# Patient Record
Sex: FEMALE | Race: WHITE | NOT HISPANIC OR LATINO | Employment: OTHER | URBAN - METROPOLITAN AREA
[De-identification: names, ages, dates, MRNs, and addresses within clinical notes are randomized per-mention and may not be internally consistent; named-entity substitution may affect disease eponyms.]

---

## 2017-05-19 ENCOUNTER — OFFICE VISIT (OUTPATIENT)
Dept: INTERNAL MEDICINE | Facility: CLINIC | Age: 77
End: 2017-05-19
Payer: COMMERCIAL

## 2017-05-19 VITALS
DIASTOLIC BLOOD PRESSURE: 66 MMHG | HEIGHT: 61 IN | TEMPERATURE: 96.6 F | WEIGHT: 166 LBS | SYSTOLIC BLOOD PRESSURE: 118 MMHG | BODY MASS INDEX: 31.34 KG/M2 | HEART RATE: 84 BPM | OXYGEN SATURATION: 95 % | RESPIRATION RATE: 20 BRPM

## 2017-05-19 DIAGNOSIS — R35.0 URINARY FREQUENCY: ICD-10-CM

## 2017-05-19 DIAGNOSIS — I10 ESSENTIAL HYPERTENSION WITH GOAL BLOOD PRESSURE LESS THAN 140/90: Primary | ICD-10-CM

## 2017-05-19 DIAGNOSIS — K21.9 GASTROESOPHAGEAL REFLUX DISEASE WITHOUT ESOPHAGITIS: ICD-10-CM

## 2017-05-19 DIAGNOSIS — R10.9 STOMACH PAIN: ICD-10-CM

## 2017-05-19 DIAGNOSIS — R91.8 PULMONARY NODULES: ICD-10-CM

## 2017-05-19 DIAGNOSIS — Z85.01 PERSONAL HISTORY OF MALIGNANT NEOPLASM OF ESOPHAGUS: ICD-10-CM

## 2017-05-19 PROCEDURE — 99397 PER PM REEVAL EST PAT 65+ YR: CPT | Performed by: INTERNAL MEDICINE

## 2017-05-19 PROCEDURE — 99214 OFFICE O/P EST MOD 30 MIN: CPT | Mod: 25 | Performed by: INTERNAL MEDICINE

## 2017-05-19 RX ORDER — SOLIFENACIN SUCCINATE 5 MG/1
5 TABLET, FILM COATED ORAL DAILY
Qty: 30 TABLET | Refills: 1 | Status: SHIPPED | OUTPATIENT
Start: 2017-05-19 | End: 2019-05-02

## 2017-05-19 RX ORDER — OXYCODONE HYDROCHLORIDE 5 MG/1
5 TABLET ORAL EVERY 8 HOURS PRN
Qty: 100 TABLET | Refills: 0 | Status: SHIPPED | OUTPATIENT
Start: 2017-05-19 | End: 2017-06-15

## 2017-05-19 ASSESSMENT — PAIN SCALES - GENERAL: PAINLEVEL: NO PAIN (0)

## 2017-05-19 NOTE — NURSING NOTE
"Chief Complaint   Patient presents with     Wellness Visit       Initial /66  Pulse 84  Temp 96.6  F (35.9  C) (Temporal)  Resp 20  Ht 5' 1\" (1.549 m)  Wt 166 lb (75.3 kg)  SpO2 95%  BMI 31.37 kg/m2 Estimated body mass index is 31.37 kg/(m^2) as calculated from the following:    Height as of this encounter: 5' 1\" (1.549 m).    Weight as of this encounter: 166 lb (75.3 kg).  Medication Reconciliation: complete    "

## 2017-05-19 NOTE — MR AVS SNAPSHOT
After Visit Summary   5/19/2017    Ingrid Garcia    MRN: [id number]           Patient Information     Date Of Birth          1940        Visit Information        Provider Department      5/19/2017 10:30 AM Mic Golden MD [facility] Instructions      Preventive Health Recommendations    Female Ages 65 +    Yearly exam:     See your health care provider every year in order to  o Review health changes.   o Discuss preventive care.    o Review your medicines if your doctor has prescribed any.      You no longer need a yearly Pap test unless you've had an abnormal Pap test in the past 10 years. If you have vaginal symptoms, such as bleeding or discharge, be sure to talk with your provider about a Pap test.      Every 1 to 2 years, have a mammogram.  If you are over 69, talk with your health care provider about whether or not you want to continue having screening mammograms.      Every 10 years, have a colonoscopy. Or, have a yearly FIT test (stool test). These exams will check for colon cancer.       Have a cholesterol test every 5 years, or more often if your doctor advises it.       Have a diabetes test (fasting glucose) every three years. If you are at risk for diabetes, you should have this test more often.       At age 65, have a bone density scan (DEXA) to check for osteoporosis (brittle bone disease).    Shots:    Get a flu shot each year.    Get a tetanus shot every 10 years.    Talk to your doctor about your pneumonia vaccines. There are now two you should receive - Pneumovax (PPSV 23) and Prevnar (PCV 13).    Talk to your doctor about the shingles vaccine.    Talk to your doctor about the hepatitis B vaccine.    Nutrition:     Eat at least 5 servings of fruits and vegetables each day.      Eat whole-grain bread, whole-wheat pasta and brown rice instead of white grains and rice.      Talk to your provider about Calcium and Vitamin D.     Lifestyle    Exercise  "at least 150 minutes a week (30 minutes a day, 5 days a week). This will help you control your weight and prevent disease.      Limit alcohol to one drink per day.      No smoking.       Wear sunscreen to prevent skin cancer.       See your dentist twice a year for an exam and cleaning.      See your eye doctor every 1 to 2 years to screen for conditions such as glaucoma, macular degeneration and cataracts.        Follow-ups after your visit        Who to contact     If you have questions or need follow up information about today's clinic visit or your schedule please contact [facility] directly at [phone].  Normal or non-critical lab and imaging results will be communicated to you by [employer], letter or phone within 4 business days after the clinic has received the results. If you do not hear from us within 7 days, please contact the clinic through [employer] or phone. If you have a critical or abnormal lab result, we will notify you by phone as soon as possible.  Submit refill requests through [employer] or call your pharmacy and they will forward the refill request to us. Please allow 3 business days for your refill to be completed.          Additional Information About Your Visit        MyChart Information     [employer] lets you send messages to your doctor, view your test results, renew your prescriptions, schedule appointments and more. To sign up, go to [email or web address] . Click on \"Log in\" on the left side of the screen, which will take you to the Welcome page. Then click on \"Sign up Now\" on the right side of the page.     You will be asked to enter the access code listed below, as well as some personal information. Please follow the directions to create your username and password.     Your access code is: [email or web address]  Expires: 2017 10:51 AM     Your access code will  in 90 days. If you need help or a new code, please call your [facility] or [phone].        Care " "EveryWhere ID     This is your Care EveryWhere ID. This could be used by other organizations to access your [place] medical records  [id number]        Your Vitals Were     Pulse Temperature Respirations Height Pulse Oximetry BMI (Body Mass Index)    84 96.6  F (35.9  C) (Temporal) 20 5' 1\" (1.549 m) 95% 31.37 kg/m2       Blood Pressure from Last 3 Encounters:   05/19/17 118/66   09/02/16 127/62   08/24/16 114/58    Weight from Last 3 Encounters:   05/19/17 166 lb (75.3 kg)   09/02/16 166 lb (75.3 kg)   08/24/16 166 lb (75.3 kg)              Today, you had the following     No orders found for display       Primary Care Provider Office Phone # Fax #    Mic Golden [phone]       [place] [address] [place] [address]        Thank you!     Thank you for choosing [facility]  for your care. Our goal is always to provide you with excellent care. Hearing back from our patients is one way we can continue to improve our services. Please take a few minutes to complete the written survey that you may receive in the mail after your visit with us. Thank you!             Your Updated Medication List - Protect others around you: Learn how to safely use, store and throw away your medicines at www.disposemymeds.org.          This list is accurate as of: 5/19/17 10:51 AM.  Always use your most recent med list.                   Brand Name Dispense Instructions for use    albuterol 108 (90 BASE) MCG/ACT Inhaler    PROAIR HFA/PROVENTIL HFA/VENTOLIN HFA    1 Inhaler    Inhale 2 puffs into the lungs 2 times daily And as needed       amLODIPine-benazepril 5-20 MG per capsule    LOTREL    90 capsule    Take 1 capsule by mouth daily       CALTRATE 600+D 600-400 MG-UNIT Chew   Generic drug:  calcium carbonate-vitamin D     180 tablet    Take 1 chew tab by mouth 2 times daily       COENZYME Q-10 PO      Take 200 mg by mouth.       cyanocobalamin 1000 MCG tablet    vitamin  " B-12     Take by mouth daily       furosemide 20 MG tablet    LASIX    90 tablet    TAKE 1 TABLET (20MG) DAILY       IMODIUM A-D 2 MG tablet   Generic drug:  loperamide      Take 1-2 tablets by mouth once a week       metoprolol 25 MG tablet    LOPRESSOR    180 tablet    Take 1 tablet (25 mg) by mouth 2 times daily       Multi-vitamin Tabs tablet   Generic drug:  multivitamin, therapeutic with minerals      Take 1 tablet by mouth daily.       omeprazole 40 MG capsule    priLOSEC    180 capsule    TAKE 1 CAPSULE TWICE DAILY 30 TO 60 MINUTES BEFORE    MEALS       oxyCODONE 5 MG IR tablet    ROXICODONE    100 tablet    Take 1 tablet (5 mg) by mouth every 8 hours as needed for moderate to severe pain or pain 10 extra for the month for extra meals.       rosuvastatin 20 MG tablet    CRESTOR    90 tablet    Take 1 tablet (20 mg) by mouth daily

## 2017-05-20 ASSESSMENT — ASTHMA QUESTIONNAIRES: ACT_TOTALSCORE: 9

## 2017-05-23 ENCOUNTER — HOSPITAL ENCOUNTER (OUTPATIENT)
Dept: CT IMAGING | Facility: CLINIC | Age: 77
Discharge: HOME | End: 2017-05-23
Attending: INTERNAL MEDICINE | Admitting: INTERNAL MEDICINE
Payer: COMMERCIAL

## 2017-05-23 DIAGNOSIS — R91.8 PULMONARY NODULES: ICD-10-CM

## 2017-05-23 DIAGNOSIS — Z85.01 PERSONAL HISTORY OF MALIGNANT NEOPLASM OF ESOPHAGUS: ICD-10-CM

## 2017-05-23 DIAGNOSIS — R10.9 STOMACH PAIN: ICD-10-CM

## 2017-05-23 LAB
CREAT BLD-MCNC: 0.8 MG/DL (ref 0.52–1.04)
GFR SERPL CREATININE-BSD FRML MDRD: 70 ML/MIN/1.7M2

## 2017-05-23 PROCEDURE — 74177 CT ABD & PELVIS W/CONTRAST: CPT

## 2017-05-23 PROCEDURE — 71260 CT THORAX DX C+: CPT

## 2017-05-23 PROCEDURE — 25000128 H RX IP 250 OP 636: Performed by: INTERNAL MEDICINE

## 2017-05-23 PROCEDURE — 82565 ASSAY OF CREATININE: CPT | Performed by: INTERNAL MEDICINE

## 2017-05-23 PROCEDURE — 25000125 ZZHC RX 250: Performed by: INTERNAL MEDICINE

## 2017-05-23 RX ORDER — IOPAMIDOL 755 MG/ML
500 INJECTION, SOLUTION INTRAVASCULAR ONCE
Status: COMPLETED | OUTPATIENT
Start: 2017-05-23 | End: 2017-05-23

## 2017-05-23 RX ADMIN — IOPAMIDOL 80 ML: 755 INJECTION, SOLUTION INTRAVENOUS at 11:49

## 2017-05-23 RX ADMIN — SODIUM CHLORIDE 60 ML: 9 INJECTION, SOLUTION INTRAVENOUS at 11:48

## 2017-06-01 ENCOUNTER — TELEPHONE (OUTPATIENT)
Dept: INTERNAL MEDICINE | Facility: CLINIC | Age: 77
End: 2017-06-01

## 2017-06-01 NOTE — TELEPHONE ENCOUNTER
Reason for Call:  Request for results:    Name of test or procedure: CT    Date of test of procedure: 05/23/17    Location of the test or procedure: [facility]    OK to leave the result message on voice mail or with a family member? YES    Phone number Patient can be reached at:  Home number on file [phone] (home)    Additional comments:     Call taken on 6/1/2017 at 1:47 PM by Sahra Roberson

## 2017-06-15 DIAGNOSIS — R10.9 STOMACH PAIN: ICD-10-CM

## 2017-06-15 NOTE — TELEPHONE ENCOUNTER
Oxycodone 5mg       Last Written Prescription Date: 5/19/17  Last Fill Quantity: 100,  # refills: 0   Last Office Visit with [facility], [facility] or [facility] prescribing provider: 6/24/14    Hanna Ty [facility].  [facility]

## 2017-06-16 RX ORDER — OXYCODONE HYDROCHLORIDE 5 MG/1
5 TABLET ORAL EVERY 8 HOURS PRN
Qty: 100 TABLET | Refills: 0 | Status: SHIPPED | OUTPATIENT
Start: 2017-06-16 | End: 2017-07-12

## 2017-07-12 DIAGNOSIS — R10.9 STOMACH PAIN: ICD-10-CM

## 2017-07-12 RX ORDER — OXYCODONE HYDROCHLORIDE 5 MG/1
5 TABLET ORAL EVERY 8 HOURS PRN
Qty: 100 TABLET | Refills: 0 | Status: SHIPPED | OUTPATIENT
Start: 2017-07-12 | End: 2017-08-08

## 2017-07-12 NOTE — TELEPHONE ENCOUNTER
Oxycodone 5      Last Written Prescription Date: 06/16/2017  Last Fill Quantity: 100,  # refills: 0   Last Office Visit with [facility], [facility] or [facility] prescribing provider: 05/19/2017    Thank You,  Livan Ivey, Pharmacy [facility] [place]

## 2017-07-13 DIAGNOSIS — K21.9 GASTROESOPHAGEAL REFLUX DISEASE, ESOPHAGITIS PRESENCE NOT SPECIFIED: ICD-10-CM

## 2017-07-13 DIAGNOSIS — E78.5 HYPERLIPIDEMIA LDL GOAL <130: ICD-10-CM

## 2017-07-13 NOTE — TELEPHONE ENCOUNTER
Omeprazole (PRILOSEC) 40 MG capsule      Last Written Prescription Date: 06/17/2016  Last Fill Quantity: 180,  # refills: 3   Last Office Visit with [facility], [facility] or [facility] prescribing provider: 05/19/2017    Cindy Hoover CMA

## 2017-07-13 NOTE — LETTER
[address]   [address]  Tel. [phone]/ Fax [phone]    October 25, 2017        Ingrid Garcia  [address] [place] [id number]          Dear Ms. Ingrid ELIAZAR Garcia:    Your previous orders for lab work(Lipid Panel) have been extended.  Please call to schedule a lab appointment and return to the clinic to have the labs drawn at your earliest convenience.    Please be fasting for these tests,  remember to have nothing by mouth (except water) after midnight on the night before your test.    If you have any questions or concerns, please contact our office.    Sincerely,       Mci Golden MD

## 2017-07-17 RX ORDER — OMEPRAZOLE 40 MG/1
CAPSULE, DELAYED RELEASE ORAL
Qty: 180 CAPSULE | Refills: 3 | Status: SHIPPED | OUTPATIENT
Start: 2017-07-17 | End: 2018-07-01

## 2017-07-17 RX ORDER — ROSUVASTATIN CALCIUM 20 MG/1
TABLET, COATED ORAL
Qty: 90 TABLET | Refills: 0 | Status: SHIPPED | OUTPATIENT
Start: 2017-07-17 | End: 2018-06-27

## 2017-07-17 NOTE — TELEPHONE ENCOUNTER
Rosuvastatin: Medication is being filled for 1 time refill only due to:  Patient needs labs FLP for rosuvastatin.     Omeprazole: Prescription approved per [facility] Refill Protocol.

## 2017-07-17 NOTE — TELEPHONE ENCOUNTER
rosuvastatin     Last Written Prescription Date: 6/17/16  Last Fill Quantity: 90, # refills: 3  Last Office Visit with [facility], P or [facility] prescribing provider: 5/19/17       Lab Results   Component Value Date    CHOL 124 06/17/2016     Lab Results   Component Value Date    HDL 42 06/17/2016     Lab Results   Component Value Date    LDL 15 06/17/2016     Lab Results   Component Value Date    TRIG 333 06/17/2016     Lab Results   Component Value Date    CHOLHDLRATIO 1.9 05/11/2015

## 2017-08-08 DIAGNOSIS — R10.9 STOMACH PAIN: ICD-10-CM

## 2017-08-08 NOTE — TELEPHONE ENCOUNTER
oxycodone      Last Written Prescription Date: 07/12/17  Last Fill Quantity: 100,  # refills: 0   Last Office Visit with [facility], [facility] or [facility] prescribing provider: 05/19/2017    Thank You,  Livan Ivey, Pharmacy [facility] [place]

## 2017-08-10 RX ORDER — OXYCODONE HYDROCHLORIDE 5 MG/1
5 TABLET ORAL EVERY 8 HOURS PRN
Qty: 100 TABLET | Refills: 0 | Status: SHIPPED | OUTPATIENT
Start: 2017-08-10 | End: 2017-09-06

## 2017-09-06 DIAGNOSIS — R10.9 STOMACH PAIN: ICD-10-CM

## 2017-09-06 RX ORDER — OXYCODONE HYDROCHLORIDE 5 MG/1
5 TABLET ORAL EVERY 8 HOURS PRN
Qty: 100 TABLET | Refills: 0 | Status: SHIPPED | OUTPATIENT
Start: 2017-09-06 | End: 2017-10-03

## 2017-09-06 NOTE — TELEPHONE ENCOUNTER
Oxycodone 5      Last Written Prescription Date: 08/10/2017  Last Fill Quantity: 100,  # refills: 0   Last Office Visit with [facility], [facility] or [facility] prescribing provider: 05/19/2017    Thank You,  Livan Ivey, Pharmacy [facility] [place]

## 2017-09-20 DIAGNOSIS — R06.02 SOB (SHORTNESS OF BREATH): Primary | ICD-10-CM

## 2017-09-21 RX ORDER — ALBUTEROL SULFATE 90 UG/1
AEROSOL, METERED RESPIRATORY (INHALATION)
Qty: 18 G | Refills: 1 | Status: SHIPPED | OUTPATIENT
Start: 2017-09-21 | End: 2018-08-29

## 2017-09-21 NOTE — TELEPHONE ENCOUNTER
VENTOLIN  (90 BASE) MCG/ACT Inhaler   Last Written Prescription Date: 06/17/2016  Last Fill Quantity: 1 inahler, # refills: 11    Last Office Visit with [facility], [facility] or [facility] prescribing provider:  05/19/2017   Future Office Visit:       Date of Last Asthma Action Plan Letter:   Asthma Action Plan Q1 Year    Topic Date Due     Asthma Action Plan - yearly  08/24/2017      Asthma Control Test:   ACT Total Scores 5/19/2017   ACT TOTAL SCORE -   ASTHMA ER VISITS -   ASTHMA HOSPITALIZATIONS -   ACT TOTAL SCORE (Goal Greater than or Equal to 20) 9   In the past 12 months, how many times did you visit the emergency room for your asthma without being admitted to the hospital? 0   In the past 12 months, how many times were you hospitalized overnight because of your asthma? 0       Date of Last Spirometry Test:   No results found for this or any previous visit.

## 2017-09-21 NOTE — TELEPHONE ENCOUNTER
Routing refill request to provider for review/approval because:  A break in medication- last filled 6/17/2016  Lesia Crabtree RN

## 2017-10-02 ENCOUNTER — TELEPHONE (OUTPATIENT)
Dept: INTERNAL MEDICINE | Facility: CLINIC | Age: 77
End: 2017-10-02

## 2017-10-02 DIAGNOSIS — B37.0 THRUSH: Primary | ICD-10-CM

## 2017-10-02 RX ORDER — DIPHENHYDRAMINE HYDROCHLORIDE AND LIDOCAINE HYDROCHLORIDE AND ALUMINUM HYDROXIDE AND MAGNESIUM HYDRO
5-10 KIT EVERY 6 HOURS PRN
Qty: 237 ML | Refills: 1 | Status: SHIPPED | OUTPATIENT
Start: 2017-10-02 | End: 2018-06-27

## 2017-10-02 NOTE — TELEPHONE ENCOUNTER
Reason for Call:  Same Day Appointment, Requested Provider:  Mic Golden MD    PCP: Mic Golden    Reason for visit: Patient has thrush and would like to get some magic mouth wash. Can she be worked in for an appt this week with Dr. Golden.     Duration of symptoms: 1 month    Have you been treated for this in the past? Yes    Additional comments: [place].     Can we leave a detailed message on this number? YES    Phone number patient can be reached at: Home number on file [phone] (home)    Best Time: any    Call taken on 10/2/2017 at 1:11 PM by Huma Gonzales

## 2017-10-02 NOTE — TELEPHONE ENCOUNTER
Pt states it the same as before and Dr. Golden can send the same thing as before.  Rx has been placed, review and change if needed.

## 2017-10-03 DIAGNOSIS — R10.9 STOMACH PAIN: ICD-10-CM

## 2017-10-03 RX ORDER — OXYCODONE HYDROCHLORIDE 5 MG/1
5 TABLET ORAL EVERY 8 HOURS PRN
Qty: 100 TABLET | Refills: 0 | Status: SHIPPED | OUTPATIENT
Start: 2017-10-03 | End: 2018-05-30

## 2017-10-03 NOTE — TELEPHONE ENCOUNTER
oxycodone      Last Written Prescription Date: 09/06/2017  Last Fill Quantity: 100,  # refills: 0   Last Office Visit with [facility], [facility] or [facility] prescribing provider: 05/19/2017    Thank You,  Livan Ivey, Pharmacy [facility] [place]

## 2017-10-04 DIAGNOSIS — E78.5 HYPERLIPIDEMIA LDL GOAL <130: ICD-10-CM

## 2017-10-04 RX ORDER — ROSUVASTATIN CALCIUM 20 MG/1
TABLET, COATED ORAL
Qty: 90 TABLET | Refills: 1 | Status: SHIPPED | OUTPATIENT
Start: 2017-10-04 | End: 2018-05-30

## 2017-10-04 NOTE — TELEPHONE ENCOUNTER
Crestor     Last Written Prescription Date: 7-  Last Fill Quantity: 90, # refills: 0  Last Office Visit with [facility], P or [facility] prescribing provider: 5-       Lab Results   Component Value Date    CHOL 124 06/17/2016     Lab Results   Component Value Date    HDL 42 06/17/2016     Lab Results   Component Value Date    LDL 15 06/17/2016     Lab Results   Component Value Date    TRIG 333 06/17/2016     Lab Results   Component Value Date    CHOLHDLRATIO 1.9 05/11/2015

## 2018-04-28 DIAGNOSIS — I10 ESSENTIAL HYPERTENSION WITH GOAL BLOOD PRESSURE LESS THAN 140/90: ICD-10-CM

## 2018-04-30 RX ORDER — METOPROLOL TARTRATE 25 MG/1
TABLET, FILM COATED ORAL
Qty: 180 TABLET | Refills: 0 | Status: SHIPPED | OUTPATIENT
Start: 2018-04-30 | End: 2018-08-13

## 2018-04-30 RX ORDER — FUROSEMIDE 20 MG
TABLET ORAL
Qty: 90 TABLET | Refills: 0 | Status: SHIPPED | OUTPATIENT
Start: 2018-04-30 | End: 2018-08-13

## 2018-04-30 NOTE — TELEPHONE ENCOUNTER
Medication is being filled for 1 time refill only due to:  Patient needs to be seen because it has been more than one year since last visit. Has appt.   Jade Salinas RN

## 2018-04-30 NOTE — TELEPHONE ENCOUNTER
"Last Written Prescription Date:  8/29/2018  Last Fill Quantity: 90/180 ,  # refills: 2   Last office visit: 6/24/2014 with prescribing provider:     Future Office Visit:   Next 5 appointments (look out 90 days)     May 18, 2018  1:00 PM CDT   [facility] with [facility]   [facility]    [address] [place] [id number] [phone]                   Requested Prescriptions   Pending Prescriptions Disp Refills     furosemide (LASIX) 20 MG tablet [Pharmacy Med Name: FUROSEMIDE TAB 20MG] 90 tablet 2     Sig: TAKE 1 TABLET DAILY    Diuretics (Including Combos) Protocol Failed    4/28/2018  8:45 AM       Failed - Normal serum potassium on file in past 12 months    Recent Labs   Lab Test  06/17/16   0858   POTASSIUM  3.8                   Failed - Normal serum sodium on file in past 12 months    Recent Labs   Lab Test  06/17/16   0858   NA  146*             Passed - Blood pressure under 140/90 in past 12 months    BP Readings from Last 3 Encounters:   05/19/17 118/66   09/02/16 127/62   08/24/16 114/58                Passed - Recent (12 mo) or future (30 days) visit within the authorizing provider's specialty    Patient had office visit in the last 12 months or has a visit in the next 30 days with authorizing provider or within the authorizing provider's specialty.  See \"Patient Info\" tab in inbasket, or \"Choose Columns\" in Meds & Orders section of the refill encounter.           Passed - Patient is age 18 or older       Passed - No active pregancy on record       Passed - Normal serum creatinine on file in past 12 months    Recent Labs   Lab Test  06/17/16   0858   CR  0.81             Passed - No positive pregnancy test in past 12 months        metoprolol tartrate (LOPRESSOR) 25 MG tablet [Pharmacy Med Name: METOPROLOL TAB TAR 25MG] 180 tablet 2     Sig: TAKE 1 TABLET TWICE A DAY    Beta-Blockers Protocol Passed    4/28/2018  8:45 AM       Passed - Blood " "pressure under 140/90 in past 12 months    BP Readings from Last 3 Encounters:   05/19/17 118/66   09/02/16 127/62   08/24/16 114/58                Passed - Patient is age 6 or older       Passed - Recent (12 mo) or future (30 days) visit within the authorizing provider's specialty    Patient had office visit in the last 12 months or has a visit in the next 30 days with authorizing provider or within the authorizing provider's specialty.  See \"Patient Info\" tab in inbasket, or \"Choose Columns\" in Meds & Orders section of the refill encounter.              "

## 2018-05-22 ENCOUNTER — OFFICE VISIT (OUTPATIENT)
Dept: FAMILY MEDICINE | Facility: CLINIC | Age: 78
End: 2018-05-22
Payer: COMMERCIAL

## 2018-05-22 VITALS
RESPIRATION RATE: 14 BRPM | BODY MASS INDEX: 29.06 KG/M2 | WEIGHT: 148 LBS | OXYGEN SATURATION: 96 % | DIASTOLIC BLOOD PRESSURE: 58 MMHG | TEMPERATURE: 98.3 F | HEART RATE: 82 BPM | HEIGHT: 60 IN | SYSTOLIC BLOOD PRESSURE: 122 MMHG

## 2018-05-22 DIAGNOSIS — Z85.01 PERSONAL HISTORY OF MALIGNANT NEOPLASM OF ESOPHAGUS: Primary | ICD-10-CM

## 2018-05-22 DIAGNOSIS — R35.0 URINARY FREQUENCY: ICD-10-CM

## 2018-05-22 DIAGNOSIS — G89.4 CHRONIC PAIN SYNDROME: ICD-10-CM

## 2018-05-22 PROCEDURE — 99214 OFFICE O/P EST MOD 30 MIN: CPT | Performed by: FAMILY MEDICINE

## 2018-05-22 ASSESSMENT — PAIN SCALES - GENERAL: PAINLEVEL: NO PAIN (0)

## 2018-05-22 NOTE — PROGRESS NOTES
SUBJECTIVE:                                                      Chief Complaint   Patient presents with     Fatigue     Urinary Problem     frequency hurt alittle        URINARY TRACT SYMPTOMS  Onset: 2 months    Description:   Painful urination (Dysuria): YES- last couple days  Blood in urine (Hematuria): no   Delay in urine (Hesitency): no     Intensity: moderate    Progression of Symptoms:  same    Accompanying Signs & Symptoms:  Fever/chills: YES  Flank pain YES  Nausea and vomiting: YES- nausea  Any vaginal symptoms: none  Abdominal/Pelvic Pain: YES    History:   History of frequent UTI's: no   History of kidney stones: YES  Sexually Active: YES  Possibility of pregnancy: No    Precipitating factors:       Therapies Tried and outcome: had a urinary doctor  Concern - fatigue   Onset: 6 months    Description:   Just fell tired doesn't want to do anything    Intensity: moderate    Progression of Symptoms:  worsening    Accompanying Signs & Symptoms:  nothing    Previous history of similar problem:   no    Precipitating factors:   Worsened by: no    Alleviating factors:  Improved by: no    Therapies Tried and outcome:     Ingrid is a 78 year old comes in to [facility].  She has been seen intermittently by my partner here.  She also spends half her year in [place].  She complains mainly of worsening pain.  She has abdominal pain since receiving an esophagectomy him.  She describes it as a spasm a sharp pain that usually coincides with eating meals.  Pain happens 2 or 3 times daily and causes her to double over.  Quite severe.  Workup back in 2017 including a CT of the chest and abdomen, EGD in 2016, were unremarkable.  She is happy with her pain control on 4 Percocets daily, #120 for the month.  Her last doctor asked her to cut back to 100, which did not make her happy.    ROS:  Constitutional, HEENT, cardiovascular, pulmonary, gi and gu systems are negative, except as otherwise noted.    OBJECTIVE:                                                     /58 (BP Location: Right arm, Patient Position: Sitting, Cuff Size: Adult Regular)  Pulse 82  Temp 98.3  F (36.8  C) (Temporal)  Resp 14  Ht 5' (1.524 m)  Wt 148 lb (67.1 kg)  SpO2 96%  BMI 28.9 kg/m2  Body mass index is 28.9 kg/(m^2).    No exam today    Diagnostic Test Results:  none      ASSESSMENT/PLAN:                                                        ICD-10-CM    1. Personal history of malignant neoplasm of esophagus Z85.01    2. Urinary frequency R35.0 PHYSICAL THERAPY REFERRAL       Spent most of our time getting her background history in terms of her chronic pain.  I informed her if clinic policy not to fill pain medications on her first visit.  She will return next week after have had a chance to visit our prescription drug database and obtain records from her L about a pain clinic provider.  I discussed her case with her former primary, who did not have red flag symptoms were use.  In terms of her urinary frequency I will set her up with physical therapy.  She should cut back on her sugar fluid intake-green tea.  And stop Lasix.    Alex Berumen MD  [facility]

## 2018-05-22 NOTE — MR AVS SNAPSHOT
"              After Visit Summary   5/22/2018    Ingrid Garcia    MRN: [id number]           Patient Information     Date Of Birth          1940        Visit Information        Provider Department      5/22/2018 1:00 PM Alex Berumen MD [facility]        Today's Diagnoses     Personal history of malignant neoplasm of esophagus    -  1    Urinary frequency        Chronic pain syndrome           Follow-ups after your visit        Additional Services     PHYSICAL THERAPY REFERRAL       *This therapy referral will be filtered to a centralized scheduling office at [facility] and the patient will receive a call to schedule an appointment at a [place] location most convenient for them. *     [facility] provides Physical Therapy evaluation and treatment and many specialty services across the [place] system.  If requesting a specialty program, please choose from the list below.    If you have not heard from the scheduling office within 2 business days, please call [phone] for all locations, with the exception of [place], please call [phone] and [facility], please call [phone]  Treatment: Evaluation & Treatment  Special Instructions/Modalities:   Special Programs: Incontinence Pelvic Floor Program    Please be aware that coverage of these services is subject to the terms and limitations of your health insurance plan.  Call member services at your health plan with any benefit or coverage questions.      **Note to Provider:  If you are referring outside of [place] for the therapy appointment, please list the name of the location in the \"special instructions\" above, print the referral and give to the patient to schedule the appointment.                  Your next 10 appointments already scheduled     May 30, 2018  3:20 PM CDT   SHORT with Alex Berumen MD   [facility] ([address] " "[facility]] [address] [phone]              Who to contact     If you have questions or need follow up information about today's clinic visit or your schedule please contact [facility] directly at [phone].  Normal or non-critical lab and imaging results will be communicated to you by MyChart, letter or phone within 4 business days after the clinic has received the results. If you do not hear from us within 7 days, please contact the clinic through MyChart or phone. If you have a critical or abnormal lab result, we will notify you by phone as soon as possible.  Submit refill requests through [employer] or call your pharmacy and they will forward the refill request to us. Please allow 3 business days for your refill to be completed.          Additional Information About Your Visit        My[employer] Information     [employer] lets you send messages to your doctor, view your test results, renew your prescriptions, schedule appointments and more. To sign up, go to [email or web address] . Click on \"Log in\" on the left side of the screen, which will take you to the Welcome page. Then click on \"Sign up Now\" on the right side of the page.     You will be asked to enter the access code listed below, as well as some personal information. Please follow the directions to create your username and password.     Your access code is: [email or web address]  Expires: 2018  6:48 PM     Your access code will  in 90 days. If you need help or a new code, please call your [facility] or [phone].        Care EveryWhere ID     This is your Care EveryWhere ID. This could be used by other organizations to access your [facility] medical records  [id number]        Your Vitals Were     Pulse Temperature Respirations Height Pulse Oximetry BMI (Body Mass Index)    82 98.3  F (36.8  C) (Temporal) 14 5' (1.524 m) 96% 28.9 kg/m2       Blood Pressure from Last 3 Encounters:   18 122/58   17 118/66 "   09/02/16 127/62    Weight from Last 3 Encounters:   05/22/18 148 lb (67.1 kg)   05/19/17 166 lb (75.3 kg)   09/02/16 166 lb (75.3 kg)              We Performed the Following     PHYSICAL THERAPY REFERRAL        Primary Care Provider Office Phone # Fax #    Mic Golden [phone]       [address] [facility] [address]        Equal Access to Services     LUCAS JOHNSTON : Hadii aad ku hadasho Soomaali, waaxda luqadaha, qaybta kaalmada adeegyada, waxay idiin hayaan adeeg kharash lanawaf . So [facility] [phone].    ATENCIÓN: Si khadarla espshara, tiene a guillaume disposición servicios gratuitos de asistencia lingüística. [facility] [phone].    We comply with applicable federal civil rights laws and [place] laws. We do not discriminate on the basis of race, color, national origin, age, disability, sex, sexual orientation, or gender identity.            Thank you!     Thank you for choosing [facility]  for your care. Our goal is always to provide you with excellent care. Hearing back from our patients is one way we can continue to improve our services. Please take a few minutes to complete the written survey that you may receive in the mail after your visit with us. Thank you!             Your Updated Medication List - Protect others around you: Learn how to safely use, store and throw away your medicines at www.disposemymeds.org.          This list is accurate as of 5/22/18  6:48 PM.  Always use your most recent med list.                   Brand Name Dispense Instructions for use Diagnosis    * albuterol 108 (90 Base) MCG/ACT Inhaler    PROAIR HFA/PROVENTIL HFA/VENTOLIN HFA    1 Inhaler    Inhale 2 puffs into the lungs 2 times daily And as needed    Mild persistent asthma without complication       * VENTOLIN  (90 Base) MCG/ACT Inhaler   Generic drug:  albuterol     18 g    INHALE 2 PUFFS INTO THE LUNGS TWO TIMES A DAY AND AS NEEDED    SOB (shortness of breath)        amLODIPine-benazepril 5-20 MG per capsule    LOTREL    90 capsule    TAKE 1 CAPSULE DAILY    Essential hypertension with goal blood pressure less than 140/90       CALTRATE 600+D 600-400 MG-UNIT Chew   Generic drug:  calcium carbonate-vitamin D     180 tablet    Take 1 chew tab by mouth 2 times daily    Absence of menstruation       COENZYME Q-10 PO      Take 200 mg by mouth.        cyanocobalamin 1000 MCG tablet    vitamin  B-12     Take by mouth daily        furosemide 20 MG tablet    LASIX    90 tablet    TAKE 1 TABLET DAILY    Essential hypertension with goal blood pressure less than 140/90       IMODIUM A-D 2 MG tablet   Generic drug:  loperamide      Take 1-2 tablets by mouth once a week        magic mouthwash suspension (diphenhydrAMINE, lidocaine, aluminum-magnesium & simethicone) Susp compounding kit     237 mL    Swish and swallow 5-10 mLs in mouth every 6 hours as needed for mouth sores    Thrush       metoprolol tartrate 25 MG tablet    LOPRESSOR    180 tablet    TAKE 1 TABLET TWICE A DAY    Essential hypertension with goal blood pressure less than 140/90       Multi-vitamin Tabs tablet   Generic drug:  multivitamin, therapeutic with minerals      Take 1 tablet by mouth daily.        omeprazole 40 MG capsule    priLOSEC    180 capsule    TAKE 1 CAPSULE TWICE DAILY 30 TO 60 MINUTES BEFORE    MEALS    Gastroesophageal reflux disease, esophagitis presence not specified       oxyCODONE IR 5 MG tablet    ROXICODONE    100 tablet    Take 1 tablet (5 mg) by mouth every 8 hours as needed for moderate to severe pain or pain 10 extra for the month for extra meals.    Stomach pain       * rosuvastatin 20 MG tablet    CRESTOR    90 tablet    TAKE 1 TABLET DAILY    Hyperlipidemia LDL goal <130       * rosuvastatin 20 MG tablet    CRESTOR    90 tablet    TAKE 1 TABLET DAILY. DUE   FOR FASTING LIPID PANEL.   SET UP LAB APPOINTMENT    Hyperlipidemia LDL goal <130       solifenacin 5 MG tablet    VESICARE    30 tablet     Take 1 tablet (5 mg) by mouth daily    Urinary frequency       * Notice:  This list has 4 medication(s) that are the same as other medications prescribed for you. Read the directions carefully, and ask your doctor or other care provider to review them with you.

## 2018-05-22 NOTE — Clinical Note
Sorry to bother you with this 1, but we do not have a regular TC.  Wondering if you would be able to contact her pain clinic, Dr Russell, [phone], [place], [place], and get some records on her

## 2018-05-23 ENCOUNTER — TELEPHONE (OUTPATIENT)
Dept: FAMILY MEDICINE | Facility: CLINIC | Age: 78
End: 2018-05-23

## 2018-05-23 ENCOUNTER — TELEPHONE (OUTPATIENT)
Dept: INTERNAL MEDICINE | Facility: CLINIC | Age: 78
End: 2018-05-23

## 2018-05-23 DIAGNOSIS — R30.0 DYSURIA: ICD-10-CM

## 2018-05-23 DIAGNOSIS — R30.0 DYSURIA: Primary | ICD-10-CM

## 2018-05-23 LAB
ALBUMIN UR-MCNC: 30 MG/DL
APPEARANCE UR: CLEAR
BACTERIA #/AREA URNS HPF: ABNORMAL /HPF
BILIRUB UR QL STRIP: NEGATIVE
COLOR UR AUTO: YELLOW
GLUCOSE UR STRIP-MCNC: NEGATIVE MG/DL
GRAN CASTS #/AREA URNS LPF: 4 /LPF
HGB UR QL STRIP: NEGATIVE
HYALINE CASTS #/AREA URNS LPF: 12 /LPF (ref 0–2)
KETONES UR STRIP-MCNC: NEGATIVE MG/DL
LEUKOCYTE ESTERASE UR QL STRIP: ABNORMAL
MUCOUS THREADS #/AREA URNS LPF: PRESENT /LPF
NITRATE UR QL: NEGATIVE
PH UR STRIP: 5 PH (ref 5–7)
RBC #/AREA URNS AUTO: 4 /HPF (ref 0–2)
SOURCE: ABNORMAL
SP GR UR STRIP: 1.02 (ref 1–1.03)
SQUAMOUS #/AREA URNS AUTO: 6 /HPF (ref 0–1)
UROBILINOGEN UR STRIP-MCNC: 0 MG/DL (ref 0–2)
WBC #/AREA URNS AUTO: 32 /HPF (ref 0–5)

## 2018-05-23 PROCEDURE — 81001 URINALYSIS AUTO W/SCOPE: CPT | Performed by: INTERNAL MEDICINE

## 2018-05-23 PROCEDURE — 87086 URINE CULTURE/COLONY COUNT: CPT | Performed by: INTERNAL MEDICINE

## 2018-05-23 RX ORDER — CIPROFLOXACIN 500 MG/1
500 TABLET, FILM COATED ORAL 2 TIMES DAILY
Qty: 6 TABLET | Refills: 0 | COMMUNITY
Start: 2018-05-23 | End: 2024-08-08

## 2018-05-23 ASSESSMENT — ASTHMA QUESTIONNAIRES: ACT_TOTALSCORE: 21

## 2018-05-23 NOTE — TELEPHONE ENCOUNTER
Call out to pt to advised of Dr. Golden recommendations of having UA done. Pt states she doesn't want a call from Dr. Golden team and she doesn't want to see Dr. Golden anymore. Pt wants to call to be reviewed by Dr. Berumen.

## 2018-05-23 NOTE — TELEPHONE ENCOUNTER
Left message for patient to return call.   script is pending and will sign historical and call in when patient calls back to see what pharmacy to call.  Florence Balbuena MA 5/23/2018

## 2018-05-23 NOTE — TELEPHONE ENCOUNTER
Reason for Call:  Other prescription    Detailed comments: Patient was seen yesterday by Dr. Berumen. He asked her if she had a UTI and at that time she did not think so. She now has all of the symptoms of a UTI. Wondering if she can get a script for this without coming back in. She uses [facility]    Phone Number Patient can be reached at: Home number on file [phone] (home)    Best Time: any    Can we leave a detailed message on this number? YES    Call taken on 5/23/2018 at 9:23 AM by Huma Gonzales

## 2018-05-23 NOTE — TELEPHONE ENCOUNTER
Ingrid returns call and she would like medication called to [facility].  She will  later today.

## 2018-05-23 NOTE — TELEPHONE ENCOUNTER
----- Message from Sandy Parra CMA sent at 5/23/2018  4:11 PM CDT -----  Routed to [facility] to report result. Pt was upset when Dr. Golden team called her earlier.

## 2018-05-24 LAB
BACTERIA SPEC CULT: NO GROWTH
Lab: NORMAL
SPECIMEN SOURCE: NORMAL

## 2018-05-25 ENCOUNTER — HOSPITAL ENCOUNTER (EMERGENCY)
Facility: CLINIC | Age: 78
Discharge: HOME | End: 2018-05-25
Attending: FAMILY MEDICINE | Admitting: FAMILY MEDICINE
Payer: COMMERCIAL

## 2018-05-25 ENCOUNTER — APPOINTMENT (OUTPATIENT)
Dept: CT IMAGING | Facility: CLINIC | Age: 78
End: 2018-05-25
Attending: FAMILY MEDICINE
Payer: COMMERCIAL

## 2018-05-25 VITALS
SYSTOLIC BLOOD PRESSURE: 173 MMHG | OXYGEN SATURATION: 95 % | TEMPERATURE: 97.8 F | DIASTOLIC BLOOD PRESSURE: 80 MMHG | BODY MASS INDEX: 29.49 KG/M2 | WEIGHT: 151 LBS | RESPIRATION RATE: 17 BRPM

## 2018-05-25 DIAGNOSIS — D64.9 ANEMIA, UNSPECIFIED TYPE: ICD-10-CM

## 2018-05-25 DIAGNOSIS — E87.6 HYPOKALEMIA: ICD-10-CM

## 2018-05-25 DIAGNOSIS — R10.32 ABDOMINAL PAIN, LEFT LOWER QUADRANT: ICD-10-CM

## 2018-05-25 LAB
ALBUMIN SERPL-MCNC: 3.6 G/DL (ref 3.4–5)
ALBUMIN UR-MCNC: NEGATIVE MG/DL
ALP SERPL-CCNC: 60 U/L (ref 40–150)
ALT SERPL W P-5'-P-CCNC: 20 U/L (ref 0–50)
ANION GAP SERPL CALCULATED.3IONS-SCNC: 7 MMOL/L (ref 3–14)
APPEARANCE UR: CLEAR
AST SERPL W P-5'-P-CCNC: 15 U/L (ref 0–45)
BASOPHILS # BLD AUTO: 0 10E9/L (ref 0–0.2)
BASOPHILS NFR BLD AUTO: 0.4 %
BILIRUB SERPL-MCNC: 0.2 MG/DL (ref 0.2–1.3)
BILIRUB UR QL STRIP: NEGATIVE
BUN SERPL-MCNC: 10 MG/DL (ref 7–30)
CALCIUM SERPL-MCNC: 8.7 MG/DL (ref 8.5–10.1)
CHLORIDE SERPL-SCNC: 110 MMOL/L (ref 94–109)
CO2 SERPL-SCNC: 26 MMOL/L (ref 20–32)
COLOR UR AUTO: ABNORMAL
CREAT SERPL-MCNC: 0.79 MG/DL (ref 0.52–1.04)
CRP SERPL-MCNC: <2.9 MG/L (ref 0–8)
DIFFERENTIAL METHOD BLD: ABNORMAL
EOSINOPHIL # BLD AUTO: 0.4 10E9/L (ref 0–0.7)
EOSINOPHIL NFR BLD AUTO: 3.9 %
ERYTHROCYTE [DISTWIDTH] IN BLOOD BY AUTOMATED COUNT: 18.3 % (ref 10–15)
FERRITIN SERPL-MCNC: 2 NG/ML (ref 8–252)
FOLATE SERPL-MCNC: 34.3 NG/ML
GFR SERPL CREATININE-BSD FRML MDRD: 71 ML/MIN/1.7M2
GLUCOSE SERPL-MCNC: 159 MG/DL (ref 70–99)
GLUCOSE UR STRIP-MCNC: NEGATIVE MG/DL
HCT VFR BLD AUTO: 29.5 % (ref 35–47)
HGB BLD-MCNC: 8.4 G/DL (ref 11.7–15.7)
HGB UR QL STRIP: NEGATIVE
IMM GRANULOCYTES # BLD: 0 10E9/L (ref 0–0.4)
IMM GRANULOCYTES NFR BLD: 0.1 %
IRON SATN MFR SERPL: 3 % (ref 15–46)
IRON SERPL-MCNC: 13 UG/DL (ref 35–180)
KETONES UR STRIP-MCNC: NEGATIVE MG/DL
LEUKOCYTE ESTERASE UR QL STRIP: ABNORMAL
LIPASE SERPL-CCNC: 114 U/L (ref 73–393)
LYMPHOCYTES # BLD AUTO: 2.3 10E9/L (ref 0.8–5.3)
LYMPHOCYTES NFR BLD AUTO: 24.4 %
MCH RBC QN AUTO: 18.3 PG (ref 26.5–33)
MCHC RBC AUTO-ENTMCNC: 28.5 G/DL (ref 31.5–36.5)
MCV RBC AUTO: 64 FL (ref 78–100)
MONOCYTES # BLD AUTO: 1 10E9/L (ref 0–1.3)
MONOCYTES NFR BLD AUTO: 10.8 %
NEUTROPHILS # BLD AUTO: 5.7 10E9/L (ref 1.6–8.3)
NEUTROPHILS NFR BLD AUTO: 60.4 %
NITRATE UR QL: NEGATIVE
PH UR STRIP: 6 PH (ref 5–7)
PLATELET # BLD AUTO: 374 10E9/L (ref 150–450)
POTASSIUM SERPL-SCNC: 3.1 MMOL/L (ref 3.4–5.3)
PROT SERPL-MCNC: 6.9 G/DL (ref 6.8–8.8)
RBC # BLD AUTO: 4.6 10E12/L (ref 3.8–5.2)
RBC #/AREA URNS AUTO: <1 /HPF (ref 0–2)
RETICS # AUTO: 39 10E9/L (ref 25–95)
RETICS/RBC NFR AUTO: 0.9 % (ref 0.5–2)
SODIUM SERPL-SCNC: 143 MMOL/L (ref 133–144)
SOURCE: ABNORMAL
SP GR UR STRIP: 1 (ref 1–1.03)
SQUAMOUS #/AREA URNS AUTO: <1 /HPF (ref 0–1)
TIBC SERPL-MCNC: 374 UG/DL (ref 240–430)
UROBILINOGEN UR STRIP-MCNC: 0 MG/DL (ref 0–2)
VIT B12 SERPL-MCNC: 3080 PG/ML (ref 193–986)
WBC # BLD AUTO: 9.4 10E9/L (ref 4–11)
WBC #/AREA URNS AUTO: 4 /HPF (ref 0–5)

## 2018-05-25 PROCEDURE — 81001 URINALYSIS AUTO W/SCOPE: CPT | Performed by: FAMILY MEDICINE

## 2018-05-25 PROCEDURE — 82728 ASSAY OF FERRITIN: CPT | Performed by: FAMILY MEDICINE

## 2018-05-25 PROCEDURE — 83540 ASSAY OF IRON: CPT | Performed by: FAMILY MEDICINE

## 2018-05-25 PROCEDURE — 83550 IRON BINDING TEST: CPT | Performed by: FAMILY MEDICINE

## 2018-05-25 PROCEDURE — 74176 CT ABD & PELVIS W/O CONTRAST: CPT

## 2018-05-25 PROCEDURE — 86140 C-REACTIVE PROTEIN: CPT | Performed by: FAMILY MEDICINE

## 2018-05-25 PROCEDURE — 96374 THER/PROPH/DIAG INJ IV PUSH: CPT | Performed by: FAMILY MEDICINE

## 2018-05-25 PROCEDURE — 80053 COMPREHEN METABOLIC PANEL: CPT | Performed by: FAMILY MEDICINE

## 2018-05-25 PROCEDURE — 82746 ASSAY OF FOLIC ACID SERUM: CPT | Performed by: FAMILY MEDICINE

## 2018-05-25 PROCEDURE — 85025 COMPLETE CBC W/AUTO DIFF WBC: CPT | Performed by: FAMILY MEDICINE

## 2018-05-25 PROCEDURE — 82607 VITAMIN B-12: CPT | Performed by: FAMILY MEDICINE

## 2018-05-25 PROCEDURE — 83690 ASSAY OF LIPASE: CPT | Performed by: FAMILY MEDICINE

## 2018-05-25 PROCEDURE — 99284 EMERGENCY DEPT VISIT MOD MDM: CPT | Mod: Z6 | Performed by: FAMILY MEDICINE

## 2018-05-25 PROCEDURE — 85045 AUTOMATED RETICULOCYTE COUNT: CPT | Performed by: FAMILY MEDICINE

## 2018-05-25 PROCEDURE — 96375 TX/PRO/DX INJ NEW DRUG ADDON: CPT | Performed by: FAMILY MEDICINE

## 2018-05-25 PROCEDURE — 25000128 H RX IP 250 OP 636: Performed by: FAMILY MEDICINE

## 2018-05-25 PROCEDURE — 99285 EMERGENCY DEPT VISIT HI MDM: CPT | Mod: 25 | Performed by: FAMILY MEDICINE

## 2018-05-25 PROCEDURE — 96376 TX/PRO/DX INJ SAME DRUG ADON: CPT | Performed by: FAMILY MEDICINE

## 2018-05-25 RX ORDER — KETOROLAC TROMETHAMINE 15 MG/ML
15 INJECTION, SOLUTION INTRAMUSCULAR; INTRAVENOUS ONCE
Status: DISCONTINUED | OUTPATIENT
Start: 2018-05-25 | End: 2018-05-25

## 2018-05-25 RX ORDER — ONDANSETRON 2 MG/ML
4 INJECTION INTRAMUSCULAR; INTRAVENOUS EVERY 30 MIN PRN
Status: DISCONTINUED | OUTPATIENT
Start: 2018-05-25 | End: 2018-05-25

## 2018-05-25 RX ORDER — ONDANSETRON 2 MG/ML
4 INJECTION INTRAMUSCULAR; INTRAVENOUS EVERY 30 MIN PRN
Status: DISCONTINUED | OUTPATIENT
Start: 2018-05-25 | End: 2018-05-25 | Stop reason: HOSPADM

## 2018-05-25 RX ADMIN — ONDANSETRON 4 MG: 2 INJECTION INTRAMUSCULAR; INTRAVENOUS at 04:57

## 2018-05-25 ASSESSMENT — ENCOUNTER SYMPTOMS
DIARRHEA: 1
DIFFICULTY URINATING: 1
FREQUENCY: 0
ACTIVITY CHANGE: 1
HEMATURIA: 0
ABDOMINAL PAIN: 1
FATIGUE: 1
PSYCHIATRIC NEGATIVE: 1
BLOOD IN STOOL: 0
NEUROLOGICAL NEGATIVE: 1
FLANK PAIN: 0
FEVER: 0
DYSURIA: 1
CHILLS: 1
APPETITE CHANGE: 1
MUSCULOSKELETAL NEGATIVE: 1
PALPITATIONS: 0
CONSTIPATION: 0
RESPIRATORY NEGATIVE: 1
NAUSEA: 0
EYES NEGATIVE: 1
VOMITING: 0

## 2018-05-25 NOTE — ED TRIAGE NOTES
Pt presents with lower left abdominal pain.  Pt states that she is being treated for a bladder infection.  Pt states about an 1-2 hours ago she started to have this pain, reminds her of when she had a kidney stone.

## 2018-05-25 NOTE — ED NOTES
Provider in to speak with pt at this time.  Pt will be sent home with the occult stool card, and instructed on collection.  Pt was instructed to bring the card in to the clinic.

## 2018-05-25 NOTE — ED NOTES
Pt back from CT at this time.  Pt requesting something for pain and nausea at this time.   Updated provider.

## 2018-05-25 NOTE — ED AVS SNAPSHOT
[facility]    [address] [place] [address]    Phone:  [phone]    Fax:  [phone]                                       Ingrid Garcia   MRN: [id number]    Department:  [facility]   Date of Visit:  5/25/2018           After Visit Summary Signature Page     I have received my discharge instructions, and my questions have been answered. I have discussed any challenges I see with this plan with the nurse or doctor.    ..........................................................................................................................................  Patient/Patient Representative Signature      ..........................................................................................................................................  Patient Representative Print Name and Relationship to Patient    ..................................................               ................................................  Date                                            Time    ..........................................................................................................................................  Reviewed by Signature/Title    ...................................................              ..............................................  Date                                                            Time

## 2018-05-25 NOTE — ED NOTES
IV removed.  Reviewed discharge instructions with pt.  No additional questions or concerns. Pt states that nausea is gone.

## 2018-05-25 NOTE — ED PROVIDER NOTES
History     Chief Complaint   Patient presents with     Abdominal Pain     HPI  Ingrid Garcia is a 78 year old female who presents to the ER with her  with concerns about LLQ abd pain that started about 2-3 hours ago. She states the pain feels similar to previous renal stone pains.  Patient states that she is currently being treated for urinary tract infection and states that they called in a prescription for her from her clinic, Dr. Golden.  She does not remember the name of the med patient.  She denied any fall or injury as a reason for her left lower quadrant abdominal pain.  She does admit to some chills but denies fever she denies changes to her bowel or bladder.  She states that she always has little diarrhea.  Patient states that she has had her appendix removed as well as had esophageal cancer resection and repair.  She is also had removal of her uterus in the past.        I reviewed the following nurse triage note:  Lesia Ireland, RN 5/25/2018  4:05 AM        Pt presents with lower left abdominal pain.  Pt states that she is being treated for a bladder infection.  Pt states about an 1-2 hours ago she started to have this pain, reminds her of when she had a kidney stone.                          Problem List:    Patient Active Problem List    Diagnosis Date Noted     S/P appendectomy 10/13/2012     Priority: High     Chronic pain syndrome 05/22/2018     Priority: Medium     Essential hypertension with goal blood pressure less than 140/90 06/17/2016     Priority: Medium     Osteoporosis 06/16/2015     Priority: Medium     Moderate persistent asthma 06/24/2014     Priority: Medium     Advanced directives, counseling/discussion 06/18/2014     Priority: Medium     Packet information was given to patient at the appointment.  Hailey Oshea CMA         Heartburn 10/14/2012     Priority: Medium     GERD (gastroesophageal reflux disease) 10/13/2012     Priority: Medium     History of esophagectomy  10/14/2012     Priority: Low     Hyperlipidemia LDL goal <130 10/13/2012     Priority: Low     Personal history of malignant neoplasm of esophagus 10/13/2012     Priority: Low        Past Medical History:    Past Medical History:   Diagnosis Date     Esophageal adenocarcinoma (H) 2011     HTN (hypertension)      Hyperlipidemia      Kidney stones 3/2015     Overactive bladder      Thyroid nodule        Past Surgical History:    Past Surgical History:   Procedure Laterality Date     APPENDECTOMY OPEN  10/12/2012    Procedure: APPENDECTOMY OPEN;;  Surgeon: Sarath Valentin MD;  Location: [facility]     ATTEMPTED LAPAROSCOPY  10/12/2012    Procedure: ATTEMPTED LAPAROSCOPY;  Attempted Laparoscopic appendectomy, open appendectomy.;  Surgeon: Sarath Valentin MD;  Location: [facility] OR     COLONOSCOPY  08/27/08     ESOPHAGOGASTRECTOMY  3/2011    [facility]     ESOPHAGOSCOPY, GASTROSCOPY, DUODENOSCOPY (EGD), COMBINED  6/30/2014    Procedure: COMBINED ESOPHAGOSCOPY, GASTROSCOPY, DUODENOSCOPY (EGD);  Surgeon: Estevan Cota MD;  Location: [facility]     ESOPHAGOSCOPY, GASTROSCOPY, DUODENOSCOPY (EGD), COMBINED N/A 9/2/2016    Procedure: COMBINED ESOPHAGOSCOPY, GASTROSCOPY, DUODENOSCOPY (EGD);  Surgeon: Juan Antonio Esteban MD;  Location: [facility]     ESOPHAGOSCOPY, GASTROSCOPY, DUODENOSCOPY (EGD), DILATATION, COMBINED  6/1/2011    Procedure:COMBINED ESOPHAGOSCOPY, GASTROSCOPY, DUODENOSCOPY (EGD), DILATATION; Surgeon:KITTY BUCK; Location:[facility]     HC COLONOSCOPY W/WO BRUSH/WASH  05/24/2004     HC UGI ENDOSCOPY DIAG W BIOPSY  10/28/09     HC UGI ENDOSCOPY DIAG W OR W/O BRUSH/WASH  05/24/2004     HC UGI ENDOSCOPY DIAG W OR W/O BRUSH/WASH  08/10/2005     HC UGI ENDOSCOPY, SIMPLE EXAM  08/27/08       Family History:    No family history on file.    Social History:  Marital Status:   [2]  Social History   Substance Use Topics     Smoking status: Never Smoker     Smokeless tobacco: Never Used     Alcohol use 0.5  oz/week     1 Glasses of wine per week      Comment: Daily        Medications:      albuterol (PROAIR HFA, PROVENTIL HFA, VENTOLIN HFA) 108 (90 BASE) MCG/ACT inhaler   amLODIPine-benazepril (LOTREL) 5-20 MG per capsule   calcium carbonate-vitamin D (CALTRATE 600+D) 600-400 MG-UNIT CHEW   COENZYME Q-10 PO   cyanocolbalamin (VITAMIN  B-12) 1000 MCG tablet   furosemide (LASIX) 20 MG tablet   loperamide (IMODIUM A-D) 2 MG tablet   magic mouthwash suspension (diphenhydramine, lidocaine, aluminum-magnesium & simethicone)   metoprolol tartrate (LOPRESSOR) 25 MG tablet   Multiple Vitamin (MULTI-VITAMIN) per tablet   omeprazole (PRILOSEC) 40 MG capsule   oxyCODONE (ROXICODONE) 5 MG IR tablet   rosuvastatin (CRESTOR) 20 MG tablet   rosuvastatin (CRESTOR) 20 MG tablet   solifenacin (VESICARE) 5 MG tablet   VENTOLIN  (90 BASE) MCG/ACT Inhaler         Review of Systems   Constitutional: Positive for activity change, appetite change, chills and fatigue. Negative for fever.   HENT: Negative.    Eyes: Negative.    Respiratory: Negative.    Cardiovascular: Negative for chest pain and palpitations.   Gastrointestinal: Positive for abdominal pain (LLQ) and diarrhea. Negative for blood in stool, constipation, nausea and vomiting.   Genitourinary: Positive for difficulty urinating and dysuria. Negative for dyspareunia, enuresis, flank pain (She denies flank pain pointing to the left lower anterior abdomen as the area pain she is concerned about.), frequency, genital sores, hematuria, menstrual problem, vaginal bleeding and vaginal pain.   Musculoskeletal: Negative.    Skin: Negative.  Negative for rash.   Neurological: Negative.    Psychiatric/Behavioral: Negative.    All other systems reviewed and are negative.      Physical Exam      Vitals:    05/25/18 0406   BP: 176/79   Resp: 18   Temp: 97.8  F (36.6  C)   TempSrc: Oral   SpO2: 96%   Weight: 68.5 kg (151 lb)         Physical Exam   Constitutional: She is oriented to person,  place, and time. She appears well-developed and well-nourished. She appears distressed (mild, She states the pain is some improved now but is somewhat nauseated but refused offer of pain or antinausea meds.).   HENT:   Head: Normocephalic.   Mouth/Throat: Oropharynx is clear and moist.   Eyes: Conjunctivae and EOM are normal. Pupils are equal, round, and reactive to light.   Neck: Normal range of motion. Neck supple.   Cardiovascular: Normal rate.    Pulmonary/Chest: Effort normal. No respiratory distress. She has no wheezes. She has no rales. She exhibits no tenderness.   Abdominal: Soft. She exhibits no distension and no mass. There is tenderness (LLQ). There is guarding. There is no rebound.   Musculoskeletal: Normal range of motion.   Neurological: She is alert and oriented to person, place, and time.   Skin: Skin is warm. No rash noted. Rash is not vesicular.   Psychiatric: She has a normal mood and affect. Her behavior is normal. Judgment and thought content normal.   Nursing note and vitals reviewed.      ED Course     ED Course     Procedures               Critical Care time:  none               Results for orders placed or performed during the hospital encounter of 05/25/18 (from the past 24 hour(s))   UA reflex to Microscopic and Culture   Result Value Ref Range    Color Urine Straw     Appearance Urine Clear     Glucose Urine Negative NEG^Negative mg/dL    Bilirubin Urine Negative NEG^Negative    Ketones Urine Negative NEG^Negative mg/dL    Specific Gravity Urine 1.003 1.003 - 1.035    Blood Urine Negative NEG^Negative    pH Urine 6.0 5.0 - 7.0 pH    Protein Albumin Urine Negative NEG^Negative mg/dL    Urobilinogen mg/dL 0.0 0.0 - 2.0 mg/dL    Nitrite Urine Negative NEG^Negative    Leukocyte Esterase Urine Trace (A) NEG^Negative    Source Midstream Urine     RBC Urine <1 0 - 2 /HPF    WBC Urine 4 0 - 5 /HPF    Squamous Epithelial /HPF Urine <1 0 - 1 /HPF   CBC with platelets differential   Result Value  Ref Range    WBC 9.4 4.0 - 11.0 10e9/L    RBC Count 4.60 3.8 - 5.2 10e12/L    Hemoglobin 8.4 (L) 11.7 - 15.7 g/dL    Hematocrit 29.5 (L) 35.0 - 47.0 %    MCV 64 (L) 78 - 100 fl    MCH 18.3 (L) 26.5 - 33.0 pg    MCHC 28.5 (L) 31.5 - 36.5 g/dL    RDW 18.3 (H) 10.0 - 15.0 %    Platelet Count 374 150 - 450 10e9/L    Diff Method Automated Method     % Neutrophils 60.4 %    % Lymphocytes 24.4 %    % Monocytes 10.8 %    % Eosinophils 3.9 %    % Basophils 0.4 %    % Immature Granulocytes 0.1 %    Absolute Neutrophil 5.7 1.6 - 8.3 10e9/L    Absolute Lymphocytes 2.3 0.8 - 5.3 10e9/L    Absolute Monocytes 1.0 0.0 - 1.3 10e9/L    Absolute Eosinophils 0.4 0.0 - 0.7 10e9/L    Absolute Basophils 0.0 0.0 - 0.2 10e9/L    Abs Immature Granulocytes 0.0 0 - 0.4 10e9/L   Comprehensive metabolic panel   Result Value Ref Range    Sodium 143 133 - 144 mmol/L    Potassium 3.1 (L) 3.4 - 5.3 mmol/L    Chloride 110 (H) 94 - 109 mmol/L    Carbon Dioxide 26 20 - 32 mmol/L    Anion Gap 7 3 - 14 mmol/L    Glucose 159 (H) 70 - 99 mg/dL    Urea Nitrogen 10 7 - 30 mg/dL    Creatinine 0.79 0.52 - 1.04 mg/dL    GFR Estimate 71 >60 mL/min/1.7m2    GFR Estimate If Black 86 >60 mL/min/1.7m2    Calcium 8.7 8.5 - 10.1 mg/dL    Bilirubin Total 0.2 0.2 - 1.3 mg/dL    Albumin 3.6 3.4 - 5.0 g/dL    Protein Total 6.9 6.8 - 8.8 g/dL    Alkaline Phosphatase 60 40 - 150 U/L    ALT 20 0 - 50 U/L    AST 15 0 - 45 U/L   Lipase   Result Value Ref Range    Lipase 114 73 - 393 U/L   CRP inflammation   Result Value Ref Range    CRP Inflammation <2.9 0.0 - 8.0 mg/L   CT Abdomen Pelvis w/o Contrast    Narrative    CT ABDOMEN PELVIS W/O CONTRAST  5/25/2018 4:48 AM     HISTORY: Left lower quadrant abdominal pain, history of renal stones.    TECHNIQUE: Noncontrast CT abdomen and pelvis was performed. Radiation  dose for this scan was reduced using automated exposure control,  adjustment of the mA and/or kV according to patient size, or iterative  reconstruction  technique.    COMPARISON: 5/23/2017.    FINDINGS:  Abdomen: The lung bases are unremarkable. Postoperative changes of  gastric pull-through. Evaluation of the solid abdominal organs is  limited by the lack of intravenous contrast. The liver, spleen,  gallbladder, pancreas and adrenal glands are normal in appearance.  There is a 0.6 cm stone in the lower pole of the left kidney. There is  no ureteral stone or hydronephrosis on either side. No abdominal or  pelvic lymph node enlargement.    Pelvis: There are sigmoid diverticula without acute diverticulitis. No  bowel obstruction or inflammation. No free intraperitoneal gas or  fluid. The uterus is absent. No adnexal mass. There is a small midline  ventral hernia containing fat, unchanged. Degenerative disease in the  spine.      Impression    IMPRESSION:  1. No acute abnormality.  2. Colonic diverticula without acute diverticulitis.  3. Left renal stone. No ureteral stone or hydronephrosis.       Medications - No data to display    Assessments & Plan (with Medical Decision Making)  78-year-old female to the ER with concerns of left lower quadrant abdominal pain symptoms.  Her exam findings are relatively benign other than a pale appearance to her skin.  No significant tenderness with palpation to the left lower quadrant was identified.  Patient noted have multiple healed scars to the abdominal wall from multiple prior surgeries.  CT scan of the left lower quadrant area did not reveal any evidence for reason for pain.  Patient was found to be significantly anemic with a hemoglobin of 8.4.  The most recent hemoglobin that we had to compare to was done in 2012 and it was normal at that time.  Patient states that she has been fatigued but this is been ongoing since her's esophageal surgery at the [facility].  He denied any blood in her urine or stool.  Patient did have a urinary tract infection recently but her urine today looks back to normal without signs of infection  present.  Patient also found to be somewhat hypokalemic.  She was encouraged to follow the dietary handout on high potassium diets.  She is going to add some iron to her current over-the-counter multivitamin.  I added additional lab tests which are pending to further evaluate for her anemia.  Patient has an appointment on Wednesday this next week with her primary care provider and have asked her to follow-up on Wednesday to have her hemoglobin rechecked.  Patient was sent home with Hemoccult stool cards to bring back on Wednesday as well.  She felt comfortable with return to home at this time and with the plan of care.  Additional handouts given to her on unexplained abdominal pain describing signs and symptoms of concern and when to return to the ER if present.  Patient discharged into the care of her .  The patient's  also told us that she is weaning off her chronic oxycontin therapy in an attempt to get off narcotics.  She started to the wean 5 days ago and it is possible that she is now having an increased perception of pains that she did not have when on the narcotics.     I have reviewed the nursing notes.    I have reviewed the findings, diagnosis, plan and need for follow up with the patient.       Final diagnoses:   Abdominal pain, left lower quadrant   Anemia, unspecified type   Hypokalemia       5/25/2018   [facility] EMERGENCY DEPARTMENT     Durga Villagran,   05/25/18 3321

## 2018-05-25 NOTE — DISCHARGE INSTRUCTIONS
Please read and follow the handout(s) instructions. Return, if needed, for increased or worsening symptoms and as directed by the handout(s).    See and follow-up the high potassium diet handouts and instructions.    Please fill out the stool cards and return them when you see Dr. Berumen on Wednesday as this will help him to know if there is signs of blood in the stool as a reason for your low blood count.    I am not sure the cause of your left lower abdominal pain.  The test results were all normal in regards to any source of pain to this area.  I suspect that you may have had spasm to the musculature causing this pain possibly caused by a low potassium level.    See if you can add some iron to the current over-the-counter vitamins to help increase your iron level and red blood cell count.    Follow-up with Dr. Berumen as planned on Wednesday.    I hope you feel better soon!    Electronically signed, Durga Villagran DO

## 2018-05-25 NOTE — ED AVS SNAPSHOT
[facility] Emergency Department    [address] [place] [address]    Phone:  [phone]    Fax:  [phone]                                       Ingrid Garcia   MRN: [id number]    Department:  [facility] Emergency Department   Date of Visit:  5/25/2018           Patient Information     Date Of Birth          1940        Your diagnoses for this visit were:     Abdominal pain, left lower quadrant     Anemia, unspecified type     Hypokalemia        You were seen by Durga Villagran DO.      Follow-up Information     Follow up with Alex Beurmen MD.    Specialty:  Family Practice    Why:  as planned on Wednesday    Contact information:    [address] [place] [address] [phone]          Follow up with [facility] Emergency Department.    Specialty:  EMERGENCY MEDICINE    Why:  If symptoms worsen    Contact information:    [address] [place] [address] [phone]    Additional information:    From [address]: Exit at [address] on [place] side of [place]. Turn right on [address]. Turn left at stoplight on [address]. [facility] will be in view two blocks ahead        Discharge Instructions       Please read and follow the handout(s) instructions. Return, if needed, for increased or worsening symptoms and as directed by the handout(s).    See and follow-up the high potassium diet handouts and instructions.    Please fill out the stool cards and return them when you see Dr. Berumen on Wednesday as this will help him to know if there is signs of blood in the stool as a reason for your low blood count.    I am not sure the cause of your left lower abdominal pain.  The test results were all normal in regards to any source of pain to this area.  I suspect that you may have had spasm to the musculature causing this pain possibly caused by a low potassium level.    See if you can add some iron to the current  over-the-counter vitamins to help increase your iron level and red blood cell count.    Follow-up with Dr. Berumen as planned on Wednesday.    I hope you feel better soon!    Electronically signed, Durga Villagran DO      Discharge References/Attachments     ANEMIA (ENGLISH)    HYPOKALEMIA (ENGLISH)    ABDOMINAL PAIN, UNKNOWN CAUSE, (FEMALE) (ENGLISH)    DIET, HIGH-POTASSIUM (ENGLISH)      Your next 10 appointments already scheduled     May 30, 2018  3:20 PM CDT   [facility] with Alex [facility]umen MD   [facility]    [address] [place] [id number] [phone]            Jun 11, 2018 11:15 AM CDT   Women's Health Eval with Amber Elizabeth, PT, NL UI (BIOFEEDBACK) [facility] ([facility])    [address] [place] [address] Dr Obi MENDEZ [id number] [phone]              24 Hour Appointment Hotline       To make an appointment at any [facility], call [phone] ([phone]). If you don't have a family doctor or clinic, we will help you find one. [facility] are conveniently located to serve the needs of you and your family.             Review of your medicines      Our records show that you are taking the medicines listed below. If these are incorrect, please call your family doctor or clinic.        Dose / Directions Last dose taken    * albuterol 108 (90 Base) MCG/ACT Inhaler   Commonly known as:  PROAIR HFA/PROVENTIL HFA/VENTOLIN HFA   Dose:  2 puff   Quantity:  1 Inhaler        Inhale 2 puffs into the lungs 2 times daily And as needed   Refills:  11        * VENTOLIN  (90 Base) MCG/ACT Inhaler   Quantity:  18 g   Generic drug:  albuterol        INHALE 2 PUFFS INTO THE LUNGS TWO TIMES A DAY AND AS NEEDED   Refills:  1        amLODIPine-benazepril 5-20 MG per capsule   Commonly known as:  LOTREL   Quantity:  90 capsule        TAKE 1 CAPSULE DAILY   Refills:  3        CALTRATE 600+D 600-400  MG-UNIT Chew   Dose:  1 chew tab   Quantity:  180 tablet   Generic drug:  calcium carbonate-vitamin D        Take 1 chew tab by mouth 2 times daily   Refills:  3        COENZYME Q-10 PO   Dose:  200 mg        Take 200 mg by mouth.   Refills:  0        cyanocobalamin 1000 MCG tablet   Commonly known as:  vitamin  B-12        Take by mouth daily   Refills:  0        furosemide 20 MG tablet   Commonly known as:  LASIX   Quantity:  90 tablet        TAKE 1 TABLET DAILY   Refills:  0        IMODIUM A-D 2 MG tablet   Dose:  1-2 tablet   Generic drug:  loperamide        Take 1-2 tablets by mouth once a week   Refills:  0        magic mouthwash suspension (diphenhydrAMINE, lidocaine, aluminum-magnesium & simethicone) Susp compounding kit   Dose:  5-10 mL   Quantity:  237 mL        Swish and swallow 5-10 mLs in mouth every 6 hours as needed for mouth sores   Refills:  1        metoprolol tartrate 25 MG tablet   Commonly known as:  LOPRESSOR   Quantity:  180 tablet        TAKE 1 TABLET TWICE A DAY   Refills:  0        Multi-vitamin Tabs tablet   Dose:  1 tablet   Generic drug:  multivitamin, therapeutic with minerals        Take 1 tablet by mouth daily.   Refills:  0        omeprazole 40 MG capsule   Commonly known as:  priLOSEC   Quantity:  180 capsule        TAKE 1 CAPSULE TWICE DAILY 30 TO 60 MINUTES BEFORE    MEALS   Refills:  3        oxyCODONE IR 5 MG tablet   Commonly known as:  ROXICODONE   Dose:  5 mg   Quantity:  100 tablet        Take 1 tablet (5 mg) by mouth every 8 hours as needed for moderate to severe pain or pain 10 extra for the month for extra meals.   Refills:  0        * rosuvastatin 20 MG tablet   Commonly known as:  CRESTOR   Quantity:  90 tablet        TAKE 1 TABLET DAILY   Refills:  0        * rosuvastatin 20 MG tablet   Commonly known as:  CRESTOR   Quantity:  90 tablet        TAKE 1 TABLET DAILY. DUE   FOR FASTING LIPID PANEL.   SET UP LAB APPOINTMENT   Refills:  1        solifenacin 5 MG tablet    Commonly known as:  VESICARE   Dose:  5 mg   Quantity:  30 tablet        Take 1 tablet (5 mg) by mouth daily   Refills:  1        * Notice:  This list has 4 medication(s) that are the same as other medications prescribed for you. Read the directions carefully, and ask your doctor or other care provider to review them with you.            Procedures and tests performed during your visit     CBC with platelets differential    CRP inflammation    CT Abdomen Pelvis w/o Contrast    Comprehensive metabolic panel    Lipase    Peripheral IV catheter    UA reflex to Microscopic and Culture      Orders Needing Specimen Collection     Ordered          05/25/18 0501  Occult blood stool - ROUTINE, Prio: Routine, Needs to be Collected     Scheduled Task Status   05/25/18 0502 Collect Occult blood stool Open   Order Class:  PCU Collect                  Pending Results     Date and Time Order Name Status Description    5/25/2018 0410 CT Abdomen Pelvis w/o Contrast Preliminary             Pending Culture Results     No orders found from 5/23/2018 to 5/26/2018.            Pending Results Instructions     If you had any lab results that were not finalized at the time of your Discharge, you can call the ED Lab Result RN at [phone]. You will be contacted by this team for any positive Lab results or changes in treatment. The nurses are available 7 days a week from 10A to 6:30P.  You can leave a message 24 hours per day and they will return your call.        Thank you for choosing [facility]       Thank you for choosing [facility] for your care. Our goal is always to provide you with excellent care. Hearing back from our patients is one way we can continue to improve our services. Please take a few minutes to complete the written survey that you may receive in the mail after you visit with us. Thank you!        [employer] Information     [facility] lets you send messages to your doctor, view your test results, renew your prescriptions,  "schedule appointments and more. To sign up, go to [email or web address] . Click on \"Log in\" on the left side of the screen, which will take you to the Welcome page. Then click on \"Sign up Now\" on the right side of the page.     You will be asked to enter the access code listed below, as well as some personal information. Please follow the directions to create your username and password.     Your access code is: [email or web address]  Expires: 2018  6:48 PM     Your access code will  in 90 days. If you need help or a new code, please call your [facility] clinic or [phone].        Care EveryWhere ID     This is your Care EveryWhere ID. This could be used by other organizations to access your [facility] medical records  [id number]        Equal Access to Services     KAL JOHNSTON : Jeremias Vargas, sruthi benjamin, harriet woo, nazia villavicencio . So [facility] [phone].    ATENCIÓN: Si habla español, tiene a guillaume disposición servicios gratuitos de asistencia lingüística. Llame al [phone].    We comply with applicable federal civil rights laws and [place] laws. We do not discriminate on the basis of race, color, national origin, age, disability, sex, sexual orientation, or gender identity.            After Visit Summary       This is your record. Keep this with you and show to your community pharmacist(s) and doctor(s) at your next visit.                  "

## 2018-05-30 ENCOUNTER — OFFICE VISIT (OUTPATIENT)
Dept: FAMILY MEDICINE | Facility: CLINIC | Age: 78
End: 2018-05-30
Payer: COMMERCIAL

## 2018-05-30 VITALS
SYSTOLIC BLOOD PRESSURE: 118 MMHG | BODY MASS INDEX: 28.51 KG/M2 | OXYGEN SATURATION: 98 % | TEMPERATURE: 98.1 F | WEIGHT: 146 LBS | DIASTOLIC BLOOD PRESSURE: 64 MMHG | HEART RATE: 75 BPM

## 2018-05-30 DIAGNOSIS — R10.9 STOMACH PAIN: ICD-10-CM

## 2018-05-30 DIAGNOSIS — E78.5 HYPERLIPIDEMIA LDL GOAL <130: Primary | ICD-10-CM

## 2018-05-30 DIAGNOSIS — D64.9 ANEMIA, UNSPECIFIED TYPE: ICD-10-CM

## 2018-05-30 DIAGNOSIS — G89.4 CHRONIC PAIN SYNDROME: ICD-10-CM

## 2018-05-30 DIAGNOSIS — E87.6 HYPOKALEMIA: ICD-10-CM

## 2018-05-30 LAB
CHOLEST SERPL-MCNC: 138 MG/DL
FERRITIN SERPL-MCNC: 4 NG/ML (ref 8–252)
HDLC SERPL-MCNC: 36 MG/DL
HGB BLD-MCNC: 9.3 G/DL (ref 11.7–15.7)
IRON SATN MFR SERPL: 43 % (ref 15–46)
IRON SERPL-MCNC: 166 UG/DL (ref 35–180)
LDLC SERPL CALC-MCNC: 28 MG/DL
NONHDLC SERPL-MCNC: 102 MG/DL
POTASSIUM SERPL-SCNC: 3.5 MMOL/L (ref 3.4–5.3)
TIBC SERPL-MCNC: 386 UG/DL (ref 240–430)
TRIGL SERPL-MCNC: 368 MG/DL

## 2018-05-30 PROCEDURE — 82607 VITAMIN B-12: CPT | Performed by: FAMILY MEDICINE

## 2018-05-30 PROCEDURE — 84132 ASSAY OF SERUM POTASSIUM: CPT | Performed by: FAMILY MEDICINE

## 2018-05-30 PROCEDURE — 83550 IRON BINDING TEST: CPT | Performed by: FAMILY MEDICINE

## 2018-05-30 PROCEDURE — 85018 HEMOGLOBIN: CPT | Performed by: FAMILY MEDICINE

## 2018-05-30 PROCEDURE — 82728 ASSAY OF FERRITIN: CPT | Performed by: FAMILY MEDICINE

## 2018-05-30 PROCEDURE — 99214 OFFICE O/P EST MOD 30 MIN: CPT | Performed by: FAMILY MEDICINE

## 2018-05-30 PROCEDURE — 99000 SPECIMEN HANDLING OFFICE-LAB: CPT | Performed by: FAMILY MEDICINE

## 2018-05-30 PROCEDURE — 36415 COLL VENOUS BLD VENIPUNCTURE: CPT | Performed by: FAMILY MEDICINE

## 2018-05-30 PROCEDURE — 80061 LIPID PANEL: CPT | Performed by: FAMILY MEDICINE

## 2018-05-30 PROCEDURE — 83540 ASSAY OF IRON: CPT | Performed by: FAMILY MEDICINE

## 2018-05-30 PROCEDURE — 82746 ASSAY OF FOLIC ACID SERUM: CPT | Performed by: FAMILY MEDICINE

## 2018-05-30 PROCEDURE — 80307 DRUG TEST PRSMV CHEM ANLYZR: CPT | Mod: 90 | Performed by: FAMILY MEDICINE

## 2018-05-30 RX ORDER — OXYCODONE HYDROCHLORIDE 5 MG/1
TABLET ORAL
Qty: 100 TABLET | Refills: 0 | Status: SHIPPED | OUTPATIENT
Start: 2018-05-30 | End: 2018-06-21

## 2018-05-30 ASSESSMENT — PAIN SCALES - GENERAL: PAINLEVEL: SEVERE PAIN (6)

## 2018-05-30 NOTE — MR AVS SNAPSHOT
After Visit Summary   5/30/2018    Ingrid Garcia    MRN: [id number]           Patient Information     Date Of Birth          1940        Visit Information        Provider Department      5/30/2018 3:20 PM Alex Berumen MD [facility]        Today's Diagnoses     Hyperlipidemia LDL goal <130    -  1    Hypokalemia        Anemia, unspecified type        Chronic pain syndrome        Stomach pain           Follow-ups after your visit        Additional Services     GASTROENTEROLOGY ADULT REF PROCEDURE ONLY Other; [facility] [phone]       Appt scheduled for 6/7/18 at 12:45 check in [place] location     Last Lab Result: Creatinine (mg/dL)       Date                     Value                 05/25/2018               0.79             ----------  Body mass index is 28.51 kg/(m^2).     Needed:  No  Language:  English    Patient will be contacted to schedule procedure.     Please be aware that coverage of these services is subject to the terms and limitations of your health insurance plan.  Call member services at your health plan with any benefit or coverage questions.  Any procedures must be performed at a [place] facility OR coordinated by your clinic's referral office.    Please bring the following with you to your appointment:    (1) Any X-Rays, CTs or MRIs which have been performed.  Contact the facility where they were done to arrange for  prior to your scheduled appointment.    (2) List of current medications   (3) This referral request   (4) Any documents/labs given to you for this referral                  Your next 10 appointments already scheduled     Jun 11, 2018 11:15 AM CDT   Women's Health Eval with Amber Elizabeth, PT, NL UI (BIOFEEDBACK) [facility] ([facility])    [address] [place] [address]   [phone]            Jun 27, 2018 11:00 AM CDT   SHORT with Alex Berumen MD  "  [facility] ([facility])    [address] [place] [id number] [phone]              Who to contact     If you have questions or need follow up information about today's clinic visit or your schedule please contact [facility] directly at [phone].  Normal or non-critical lab and imaging results will be communicated to you by MyChart, letter or phone within 4 business days after the clinic has received the results. If you do not hear from us within 7 days, please contact the clinic through MyChart or phone. If you have a critical or abnormal lab result, we will notify you by phone as soon as possible.  Submit refill requests through [employer] or call your pharmacy and they will forward the refill request to us. Please allow 3 business days for your refill to be completed.          Additional Information About Your Visit        MyChart Information     [employer] lets you send messages to your doctor, view your test results, renew your prescriptions, schedule appointments and more. To sign up, go to [email or web address] . Click on \"Log in\" on the left side of the screen, which will take you to the Welcome page. Then click on \"Sign up Now\" on the right side of the page.     You will be asked to enter the access code listed below, as well as some personal information. Please follow the directions to create your username and password.     Your access code is: [email or web address]  Expires: 2018  6:48 PM     Your access code will  in 90 days. If you need help or a new code, please call your [facility] or [phone].        Care EveryWhere ID     This is your Care EveryWhere ID. This could be used by other organizations to access your [place] medical records  [id number]        Your Vitals Were     Pulse Temperature Pulse Oximetry BMI (Body Mass Index)          75 98.1  F (36.7  C) (Temporal) 98% 28.51 kg/m2         Blood Pressure from Last 3 " Encounters:   05/30/18 118/64   05/25/18 173/80   05/22/18 122/58    Weight from Last 3 Encounters:   05/30/18 146 lb (66.2 kg)   05/25/18 151 lb (68.5 kg)   05/22/18 148 lb (67.1 kg)              We Performed the Following     Ferritin     Folate     GASTROENTEROLOGY ADULT REF PROCEDURE ONLY Other; [facility] [phone]     Hemoglobin     Iron and iron binding capacity     Lipid panel reflex to direct LDL Fasting     Pain Drug Scr UR W Rptd Meds     Potassium     Vitamin B12          Today's Medication Changes          These changes are accurate as of 5/30/18  7:47 PM.  If you have any questions, ask your nurse or doctor.               These medicines have changed or have updated prescriptions.        Dose/Directions    oxyCODONE IR 5 MG tablet   Commonly known as:  ROXICODONE   This may have changed:    - how much to take  - how to take this  - when to take this  - reasons to take this  - additional instructions   Used for:  Stomach pain   Changed by:  Alex Berumen MD        Take 5 mg 3-4 times daily for abdominal pain. 30 ds   Quantity:  100 tablet   Refills:  0            Where to get your medicines      Some of these will need a paper prescription and others can be bought over the counter.  Ask your nurse if you have questions.     Bring a paper prescription for each of these medications     oxyCODONE IR 5 MG tablet               Information about OPIOIDS     PRESCRIPTION OPIOIDS: WHAT YOU NEED TO KNOW   You have a prescription for an opioid (narcotic) pain medicine. Opioids can cause addiction. If you have a history of chemical dependency of any type, you are at a higher risk of becoming addicted to opioids. Only take this medicine after all other options have been tried. Take it for as short a time and as few doses as possible.     Do not:    Drive. If you drive while taking these medicines, you could be arrested for driving under the influence (DUI).    Operate heavy machinery    Do any other  dangerous activities while taking these medicines.     Drink any alcohol while taking these medicines.      Take with any other medicines that contain acetaminophen. Read all labels carefully. Look for the word  acetaminophen  or  Tylenol.  Ask your pharmacist if you have questions or are unsure.    Store your pills in a secure place, locked if possible. We will not replace any lost or stolen medicine. If you don t finish your medicine, please throw away (dispose) as directed by your pharmacist. The [place] Pollution Control Agency has more information about safe disposal: https://www.pca.[facility].[place].us/living-green/managing-unwanted-medications    All opioids tend to cause constipation. Drink plenty of water and eat foods that have a lot of fiber, such as fruits, vegetables, prune juice, apple juice and high-fiber cereal. Take a laxative (Miralax, milk of magnesia, Colace, Senna) if you don t move your bowels at least every other day.          Primary Care Provider Office Phone # Fax #    Alex Berumen [phone] [address] [place] [address]        Equal Access to Services     [facility]: Hadii aad ku hadasho Soomaali, waaxda luqadaha, qaybta kaalmada adepao, nazia villavicencio . So [facility] [phone].    ATENCIÓN: Si habla español, tiene a gulilaume disposición servicios gratuitos de asistencia lingüística. Lorie al [phone].    We comply with applicable federal civil rights laws and [place] laws. We do not discriminate on the basis of race, color, national origin, age, disability, sex, sexual orientation, or gender identity.            Thank you!     Thank you for choosing [facility]  for your care. Our goal is always to provide you with excellent care. Hearing back from our patients is one way we can continue to improve our services. Please take a few minutes to complete the written survey that you may receive in the mail after your  visit with us. Thank you!             Your Updated Medication List - Protect others around you: Learn how to safely use, store and throw away your medicines at www.disposemymeds.org.          This list is accurate as of 5/30/18  7:47 PM.  Always use your most recent med list.                   Brand Name Dispense Instructions for use Diagnosis    * albuterol 108 (90 Base) MCG/ACT Inhaler    PROAIR HFA/PROVENTIL HFA/VENTOLIN HFA    1 Inhaler    Inhale 2 puffs into the lungs 2 times daily And as needed    Mild persistent asthma without complication       * VENTOLIN  (90 Base) MCG/ACT Inhaler   Generic drug:  albuterol     18 g    INHALE 2 PUFFS INTO THE LUNGS TWO TIMES A DAY AND AS NEEDED    SOB (shortness of breath)       amLODIPine-benazepril 5-20 MG per capsule    LOTREL    90 capsule    TAKE 1 CAPSULE DAILY    Essential hypertension with goal blood pressure less than 140/90       CALTRATE 600+D 600-400 MG-UNIT Chew   Generic drug:  calcium carbonate-vitamin D     180 tablet    Take 1 chew tab by mouth 2 times daily    Absence of menstruation       COENZYME Q-10 PO      Take 200 mg by mouth.        cyanocobalamin 1000 MCG tablet    vitamin  B-12     Take by mouth daily        furosemide 20 MG tablet    LASIX    90 tablet    TAKE 1 TABLET DAILY    Essential hypertension with goal blood pressure less than 140/90       IMODIUM A-D 2 MG tablet   Generic drug:  loperamide      Take 1-2 tablets by mouth once a week        magic mouthwash suspension (diphenhydrAMINE, lidocaine, aluminum-magnesium & simethicone) Susp compounding kit     237 mL    Swish and swallow 5-10 mLs in mouth every 6 hours as needed for mouth sores    Thrush       metoprolol tartrate 25 MG tablet    LOPRESSOR    180 tablet    TAKE 1 TABLET TWICE A DAY    Essential hypertension with goal blood pressure less than 140/90       Multi-vitamin Tabs tablet   Generic drug:  multivitamin, therapeutic with minerals      Take 1 tablet by mouth daily.         omeprazole 40 MG capsule    priLOSEC    180 capsule    TAKE 1 CAPSULE TWICE DAILY 30 TO 60 MINUTES BEFORE    MEALS    Gastroesophageal reflux disease, esophagitis presence not specified       oxyCODONE IR 5 MG tablet    ROXICODONE    100 tablet    Take 5 mg 3-4 times daily for abdominal pain. 30 ds    Stomach pain       rosuvastatin 20 MG tablet    CRESTOR    90 tablet    TAKE 1 TABLET DAILY    Hyperlipidemia LDL goal <130       solifenacin 5 MG tablet    VESICARE    30 tablet    Take 1 tablet (5 mg) by mouth daily    Urinary frequency       * Notice:  This list has 2 medication(s) that are the same as other medications prescribed for you. Read the directions carefully, and ask your doctor or other care provider to review them with you.

## 2018-05-30 NOTE — NURSING NOTE
Health Maintenance Due   Topic Date Due     URINE DRUG SCREEN Q1 YR  03/12/1955     TETANUS IMMUNIZATION (SYSTEM ASSIGNED)  03/12/1958     KLEBER QUESTIONNAIRE 1 YEAR  03/12/1958     PHQ-9 Q1YR  03/12/1958     LIPID MONITORING Q1 YEAR  06/17/2017     ASTHMA ACTION PLAN Q1 YR  08/24/2017     FALL RISK ASSESSMENT  05/19/2018       Health Maintenance reviewed at today's visit patient asked to schedule/complete:   Patient is aware.

## 2018-05-30 NOTE — PROGRESS NOTES
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  I saw her last week.  I asked no history little better.  I obtained her old records from her [place] clinic.  There she receives care for 6 months out of the year.  He confirms that she takes oxycodone without evidence of diversion or misuse.  #120 last month.  She takes the oxycodone prior to her meal in order to prevent pain.  If she does not take medication that she has sharp stabbing pains.  She eats smaller meals due to her esophagectomy.  Her weight is down about 20 pounds in the last 6 months.  She went to the ER recently and found to have anemia but denies any active GI bleeding that she is noted.  She is not sure she gets much in the way of dietary iron.  2 years ago her EGD was unremarkable.  Her colonoscopy was last in 2012 and negative for cancer.    ROS:  ROS: 10 point ROS neg other than the symptoms noted above in the HPI.    OBJECTIVE:                                                    /64 (BP Location: Left arm, Patient Position: Chair, Cuff Size: Adult Regular)  Pulse 75  Temp 98.1  F (36.7  C) (Temporal)  Wt 146 lb (66.2 kg)  SpO2 98%  BMI 28.51 kg/m2  Body mass index is 28.51 kg/(m^2).    Well-appearing elderly lady.  She is a good historian.  Good color.  Heart regular.  Lungs clear and unlabored.  Abdomen is soft nontender.  Extremities without edema.    Diagnostic Test Results:  none      ASSESSMENT/PLAN:                                                        ICD-10-CM    1. Hyperlipidemia LDL goal <130 E78.5 Lipid panel reflex to direct LDL Fasting   2. Hypokalemia  E87.6 Potassium   3. Anemia, unspecified type D64.9 Iron and iron binding capacity     Ferritin     Vitamin B12     Folate     Hemoglobin     GASTROENTEROLOGY ADULT REF PROCEDURE ONLY Other; [facility] [phone]   4. Chronic pain syndrome G89.4 Pain Drug Scr UR W Rptd Meds   5. Stomach pain R10.9 oxyCODONE IR (ROXICODONE) 5 MG tablet     GASTROENTEROLOGY ADULT REF PROCEDURE ONLY Other; [facility] [phone]     I will restart her oxycodone today.  There is been no evidence of escalating use, diversion, misuse.  Drug screen obtained today.  We will need to obtain a drug contract with her.  Due to her iron deficiency anemia we will work that up with upper and lower scopes and with lab work today.  She was also found to have low potassium which is related likely to her Lasix.  She did stop her Lasix and had nice improvement in her urinary frequency.  We did this at her last visit.  She will continue to stay off Lasix.  I will see her back in a month.  She is going to try to use a half tab of oxycodone with each meals to see how that works for her.  It is an unusual use of narcotics, but one that has resulted in a much improved quality of life.  We could consider a PPI, diltiazem in the future and cases of esophageal spasm issue    Alex Berumen MD  [facility]

## 2018-05-30 NOTE — LETTER
My Asthma Action Plan  Name: Ingrid Garcia   YOB: 1940  Date: 5/30/2018   My doctor: Alex Berumen MD   My clinic: [facility]        My Control Medicine: None  My Rescue Medicine: Albuterol nebulizer solution as needed  Albuterol (Proair/Ventolin/Proventil) inhaler as needed   My Asthma Severity: mild persistent  Avoid your asthma triggers: humidity and exercise or sports               GREEN ZONE   Good Control    I feel good    No cough or wheeze    Can work, sleep and play without asthma symptoms       Take your asthma control medicine every day.     1. If exercise triggers your asthma, take your rescue medication    15 minutes before exercise or sports, and    During exercise if you have asthma symptoms  2. Spacer to use with inhaler: If you have a spacer, make sure to use it with your inhaler             YELLOW ZONE Getting Worse  I have ANY of these:    I do not feel good    Cough or wheeze    Chest feels tight    Wake up at night   1. Keep taking your Green Zone medications  2. Start taking your rescue medicine:    every 20 minutes for up to 1 hour. Then every 4 hours for 24-48 hours.  3. If you stay in the Yellow Zone for more than 12-24 hours, contact your doctor.  4. If you do not return to the Green Zone in 12-24 hours or you get worse, start taking your oral steroid medicine if prescribed by your provider.           RED ZONE Medical Alert - Get Help  I have ANY of these:    I feel awful    Medicine is not helping    Breathing getting harder    Trouble walking or talking    Nose opens wide to breathe       1. Take your rescue medicine NOW  2. If your provider has prescribed an oral steroid medicine, start taking it NOW  3. Call your doctor NOW  4. If you are still in the Red Zone after 20 minutes and you have not reached your doctor:    Take your rescue medicine again and    Call 911 or go to the emergency room right away    See your regular doctor within 2 weeks of  an Emergency Room or Urgent Care visit for follow-up treatment.          Annual Reminders:  Meet with Asthma Educator,  Flu Shot in the Fall, consider Pneumonia Vaccination for patients with asthma (aged 19 and older).    Pharmacy[facility]    [facility] [place] [place] - [address] AT PORTAL TO REGISTERED [facility] SITES  [facility] - [place], [place] - [address]  [facility] [place], [place] - [address] [place] [facility] - [place], [place] - [address]                      Asthma Triggers  How To Control Things That Make Your Asthma Worse    Triggers are things that make your asthma worse.  Look at the list below to help you find your triggers and what you can do about them.  You can help prevent asthma flare-ups by staying away from your triggers.      Trigger                                                          What you can do   Cigarette Smoke  Tobacco smoke can make asthma worse. Do not allow smoking in your home, car or around you.  Be sure no one smokes at a child s day care or school.  If you smoke, ask your health care provider for ways to help you quit.  Ask family members to quit too.  Ask your health care provider for a referral to Quit Plan to help you quit smoking, or call 1-400-629-PLAN.     Colds, Flu, Bronchitis  These are common triggers of asthma. Wash your hands often.  Don t touch your eyes, nose or mouth.  Get a flu shot every year.     Dust Mites  These are tiny bugs that live in cloth or carpet. They are too small to see. Wash sheets and blankets in hot water every week.   Encase pillows and mattress in dust mite proof covers.  Avoid having carpet if you can. If you have carpet, vacuum weekly.   Use a dust mask and HEPA vacuum.   Pollen and Outdoor Mold  Some people are allergic to trees, grass, or weed pollen, or molds. Try to keep your windows closed.  Limit time out doors when pollen count is high.   Ask you health  care provider about taking medicine during allergy season.     Animal Dander  Some people are allergic to skin flakes, urine or saliva from pets with fur or feathers. Keep pets with fur or feathers out of your home.    If you can t keep the pet outdoors, then keep the pet out of your bedroom.  Keep the bedroom door closed.  Keep pets off cloth furniture and away from stuffed toys.     Mice, Rats, and Cockroaches  Some people are allergic to the waste from these pests.   Cover food and garbage.  Clean up spills and food crumbs.  Store grease in the refrigerator.   Keep food out of the bedroom.   Indoor Mold  This can be a trigger if your home has high moisture. Fix leaking faucets, pipes, or other sources of water.   Clean moldy surfaces.  Dehumidify basement if it is damp and smelly.   Smoke, Strong Odors, and Sprays  These can reduce air quality. Stay away from strong odors and sprays, such as perfume, powder, hair spray, paints, smoke incense, paint, cleaning products, candles and new carpet.   Exercise or Sports  Some people with asthma have this trigger. Be active!  Ask your doctor about taking medicine before sports or exercise to prevent symptoms.    Warm up for 5-10 minutes before and after sports or exercise.     Other Triggers of Asthma  Cold air:  Cover your nose and mouth with a scarf.  Sometimes laughing or crying can be a trigger.  Some medicines and food can trigger asthma.

## 2018-05-31 LAB
FOLATE SERPL-MCNC: 32.5 NG/ML
VIT B12 SERPL-MCNC: 4735 PG/ML (ref 193–986)

## 2018-05-31 ASSESSMENT — ANXIETY QUESTIONNAIRES
6. BECOMING EASILY ANNOYED OR IRRITABLE: NOT AT ALL
7. FEELING AFRAID AS IF SOMETHING AWFUL MIGHT HAPPEN: NOT AT ALL
1. FEELING NERVOUS, ANXIOUS, OR ON EDGE: MORE THAN HALF THE DAYS
2. NOT BEING ABLE TO STOP OR CONTROL WORRYING: NOT AT ALL
3. WORRYING TOO MUCH ABOUT DIFFERENT THINGS: NOT AT ALL
GAD7 TOTAL SCORE: 5
5. BEING SO RESTLESS THAT IT IS HARD TO SIT STILL: MORE THAN HALF THE DAYS
IF YOU CHECKED OFF ANY PROBLEMS ON THIS QUESTIONNAIRE, HOW DIFFICULT HAVE THESE PROBLEMS MADE IT FOR YOU TO DO YOUR WORK, TAKE CARE OF THINGS AT HOME, OR GET ALONG WITH OTHER PEOPLE: NOT DIFFICULT AT ALL

## 2018-05-31 ASSESSMENT — PATIENT HEALTH QUESTIONNAIRE - PHQ9: 5. POOR APPETITE OR OVEREATING: SEVERAL DAYS

## 2018-06-01 ASSESSMENT — PATIENT HEALTH QUESTIONNAIRE - PHQ9: SUM OF ALL RESPONSES TO PHQ QUESTIONS 1-9: 11

## 2018-06-01 ASSESSMENT — ANXIETY QUESTIONNAIRES: GAD7 TOTAL SCORE: 5

## 2018-06-02 LAB — PAIN DRUG SCR UR W RPTD MEDS: NORMAL

## 2018-06-04 ENCOUNTER — TELEPHONE (OUTPATIENT)
Dept: FAMILY MEDICINE | Facility: CLINIC | Age: 78
End: 2018-06-04

## 2018-06-04 NOTE — TELEPHONE ENCOUNTER
----- Message from Alex Berumen MD sent at 6/1/2018  3:07 PM CDT -----  Please send letter with results.  Thanks. Labs look good. Her iron levels already look better, so does her anemia. Should get her scopes done and follow up with me.

## 2018-06-04 NOTE — LETTER
[address] [id number] [phone]        June 5, 2018    Ingrid Garcia  [address] [place] [address] [address] [place] [id number]          Dear Ingrid,    LAB RESULTS:     The results of your recent Tests were:  Labs look good. Your  iron levels already look better, so does your anemia. You should get her scopes done and follow up with me.   If you have any further questions or problems, please contact our office at [phone].        Sincerely,        Alex Berumen MD

## 2018-06-07 ENCOUNTER — TRANSFERRED RECORDS (OUTPATIENT)
Dept: HEALTH INFORMATION MANAGEMENT | Facility: CLINIC | Age: 78
End: 2018-06-07

## 2018-06-27 ENCOUNTER — OFFICE VISIT (OUTPATIENT)
Dept: FAMILY MEDICINE | Facility: CLINIC | Age: 78
End: 2018-06-27
Payer: COMMERCIAL

## 2018-06-27 ENCOUNTER — DOCUMENTATION ONLY (OUTPATIENT)
Dept: FAMILY MEDICINE | Facility: CLINIC | Age: 78
End: 2018-06-27

## 2018-06-27 VITALS
HEART RATE: 63 BPM | OXYGEN SATURATION: 99 % | DIASTOLIC BLOOD PRESSURE: 80 MMHG | TEMPERATURE: 97.7 F | BODY MASS INDEX: 29.22 KG/M2 | SYSTOLIC BLOOD PRESSURE: 116 MMHG | WEIGHT: 149.6 LBS

## 2018-06-27 DIAGNOSIS — G89.4 CHRONIC PAIN SYNDROME: ICD-10-CM

## 2018-06-27 DIAGNOSIS — E78.5 HYPERLIPIDEMIA LDL GOAL <130: ICD-10-CM

## 2018-06-27 DIAGNOSIS — I10 ESSENTIAL HYPERTENSION WITH GOAL BLOOD PRESSURE LESS THAN 140/90: Primary | ICD-10-CM

## 2018-06-27 DIAGNOSIS — R10.13 ABDOMINAL PAIN, EPIGASTRIC: ICD-10-CM

## 2018-06-27 PROCEDURE — 99214 OFFICE O/P EST MOD 30 MIN: CPT | Performed by: FAMILY MEDICINE

## 2018-06-27 RX ORDER — OXYCODONE HYDROCHLORIDE 5 MG/1
TABLET ORAL
Qty: 112 TABLET | Refills: 0 | Status: SHIPPED | OUTPATIENT
Start: 2018-06-30 | End: 2018-07-25

## 2018-06-27 RX ORDER — OXYCODONE HYDROCHLORIDE 5 MG/1
5 TABLET ORAL EVERY 6 HOURS PRN
Qty: 12 TABLET | Refills: 0 | Status: SHIPPED | OUTPATIENT
Start: 2018-06-27 | End: 2018-06-27

## 2018-06-27 ASSESSMENT — PAIN SCALES - GENERAL: PAINLEVEL: NO PAIN (0)

## 2018-06-27 NOTE — MR AVS SNAPSHOT
After Visit Summary   6/27/2018    Ingrid Garcia    MRN: [id number]           Patient Information     Date Of Birth          1940        Visit Information        Provider Department      6/27/2018 11:00 AM Alex Berumen MD [facility]        Today's Diagnoses     Essential hypertension with goal blood pressure less than 140/90    -  1    Hyperlipidemia LDL goal <130        Abdominal pain, epigastric        Chronic pain syndrome           Follow-ups after your visit        Your next 10 appointments already scheduled     Jul 11, 2018  7:30 AM CDT   US ABDOMEN LIMITED with PHUS1   [facility] Ultrasound ([facility])    [address] [place] [address] [id number] [phone]           Please bring a list of your medicines (including vitamins, minerals and over-the-counter drugs). Also, tell your doctor about any allergies you may have. Wear comfortable clothes and leave your valuables at home.  Adults: No eating or drinking for 8 hours before the exam. You may take medicine with a small sip of water.  Children: - Children 6+ years: No food or drink for 6 hours before exam. - Children 1-5 years: No food or drink for 4 hours before exam. - Infants, breast-fed: may have breast milk up to 2 hours before exam. - Infants, formula: may have bottle until 4 hours before exam.  Please call the Imaging Department at your exam site with any questions.            Jul 11, 2018  8:45 AM CDT   NM HEPATOBILIARY SCAN W GB EF with PHNM1   [facility] Nuclear Medicine ([facility])    [address] [place] [address] [id number] [phone]           Please bring a list of your medicines to the exam. (Include vitamins, minerals and over-the-counter drugs.) You should wear comfortable clothes. Leave your valuables at home. Please bring related prior results and films.  Tell your doctor:   If you are breastfeeding or may be pregnant.   If you  have had a test including barium within the past 48 hours. Barium may change the results of certain exams.   If you think you may need sedation (medicine to help you relax).  4 hours before your exam: stop drinking and eating. Stop all morphine or morphine derivatives.  Please call your Imaging Department at your exam site with any questions.              Future tests that were ordered for you today     Open Future Orders        Priority Expected Expires Ordered    US Abdomen Limited Routine  6/27/2019 6/27/2018    NM Hepatobiliary Scan w GB EF Routine  6/27/2019 6/27/2018            Who to contact     If you have questions or need follow up information about today's clinic visit or your schedule please contact [facility] directly at [phone].  Normal or non-critical lab and imaging results will be communicated to you by MyChart, letter or phone within 4 business days after the clinic has received the results. If you do not hear from us within 7 days, please contact the clinic through MyChart or phone. If you have a critical or abnormal lab result, we will notify you by phone as soon as possible.  Submit refill requests through [employer] or call your pharmacy and they will forward the refill request to us. Please allow 3 business days for your refill to be completed.          Additional Information About Your Visit        Care EveryWhere ID     This is your Care EveryWhere ID. This could be used by other organizations to access your [place] medical records  [id number]        Your Vitals Were     Pulse Temperature Pulse Oximetry BMI (Body Mass Index)          63 97.7  F (36.5  C) (Temporal) 99% 29.22 kg/m2         Blood Pressure from Last 3 Encounters:   06/27/18 116/80   05/30/18 118/64   05/25/18 173/80    Weight from Last 3 Encounters:   06/27/18 149 lb 9.6 oz (67.9 kg)   05/30/18 146 lb (66.2 kg)   05/25/18 151 lb (68.5 kg)              We Performed the Following     Basic metabolic panel   (Ca, Cl, CO2, Creat, Gluc, K, Na, BUN)     Lipid panel reflex to direct LDL Fasting          Today's Medication Changes          These changes are accurate as of 6/27/18 11:57 AM.  If you have any questions, ask your nurse or doctor.               These medicines have changed or have updated prescriptions.        Dose/Directions    * oxyCODONE IR 5 MG tablet   Commonly known as:  ROXICODONE   This may have changed:  You were already taking a medication with the same name, and this prescription was added. Make sure you understand how and when to take each.   Used for:  Abdominal pain, epigastric   Changed by:  Alex Berumen MD        Dose:  5 mg   Take 1 tablet (5 mg) by mouth every 6 hours as needed for severe pain   Quantity:  12 tablet   Refills:  0       * oxyCODONE IR 5 MG tablet   Commonly known as:  ROXICODONE   This may have changed:  Another medication with the same name was added. Make sure you understand how and when to take each.   Used for:  Stomach pain   Changed by:  Alex Berumen MD        Start taking on:  6/30/2018   Take 5 mg 3-4 times daily for abdominal pain.   Quantity:  100 tablet   Refills:  0       * Notice:  This list has 2 medication(s) that are the same as other medications prescribed for you. Read the directions carefully, and ask your doctor or other care provider to review them with you.         Where to get your medicines      Some of these will need a paper prescription and others can be bought over the counter.  Ask your nurse if you have questions.     Bring a paper prescription for each of these medications     oxyCODONE IR 5 MG tablet               Information about OPIOIDS     PRESCRIPTION OPIOIDS: WHAT YOU NEED TO KNOW   We gave you an opioid (narcotic) pain medicine. It is important to manage your pain, but opioids are not always the best choice. You should first try all the other options your care team gave you. Take this medicine for as short a time (and as  few doses) as possible.     These medicines have risks:    DO NOT drive when on new or higher doses of pain medicine. These medicines can affect your alertness and reaction times, and you could be arrested for driving under the influence (DUI). If you need to use opioids long-term, talk to your care team about driving.    DO NOT operate heave machinery    DO NOT do any other dangerous activities while taking these medicines.     DO NOT drink any alcohol while taking these medicines.      If the opioid prescribed includes acetaminophen, DO NOT take with any other medicines that contain acetaminophen. Read all labels carefully. Look for the word  acetaminophen  or  Tylenol.  Ask your pharmacist if you have questions or are unsure.    You can get addicted to pain medicines, especially if you have a history of addiction (chemical, alcohol or substance dependence). Talk to your care team about ways to reduce this risk.    Store your pills in a secure place, locked if possible. We will not replace any lost or stolen medicine. If you don t finish your medicine, please throw away (dispose) as directed by your pharmacist. The [place] Pollution Control Agency has more information about safe disposal: https://www.pca.[facility].[place].us/living-green/managing-unwanted-medications.     All opioids tend to cause constipation. Drink plenty of water and eat foods that have a lot of fiber, such as fruits, vegetables, prune juice, apple juice and high-fiber cereal. Take a laxative (Miralax, milk of magnesia, Colace, Senna) if you don t move your bowels at least every other day.          Primary Care Provider Office Phone # Fax #    Alex Berumen [phone]        [facility] [address] [place] [address]        Equal Access to Services     [facility]: Hadii dayana mabry hadasho Somattie, waaxda luqadaha, qaybta kaalnazia silva. [facility] [phone].    ATENCIÓN: Jud vela  español, tiene a guillaume disposición servicios gratuitos de asistencia lingüística. Lorie malagon [phone].    We comply with applicable federal civil rights laws and [place] laws. We do not discriminate on the basis of race, color, national origin, age, disability, sex, sexual orientation, or gender identity.            Thank you!     Thank you for choosing [facility]  for your care. Our goal is always to provide you with excellent care. Hearing back from our patients is one way we can continue to improve our services. Please take a few minutes to complete the written survey that you may receive in the mail after your visit with us. Thank you!             Your Updated Medication List - Protect others around you: Learn how to safely use, store and throw away your medicines at www.disposemymeds.org.          This list is accurate as of 6/27/18 11:57 AM.  Always use your most recent med list.                   Brand Name Dispense Instructions for use Diagnosis    * albuterol 108 (90 Base) MCG/ACT Inhaler    PROAIR HFA/PROVENTIL HFA/VENTOLIN HFA    1 Inhaler    Inhale 2 puffs into the lungs 2 times daily And as needed    Mild persistent asthma without complication       * VENTOLIN  (90 Base) MCG/ACT Inhaler   Generic drug:  albuterol     18 g    INHALE 2 PUFFS INTO THE LUNGS TWO TIMES A DAY AND AS NEEDED    SOB (shortness of breath)       amLODIPine-benazepril 5-20 MG per capsule    LOTREL    90 capsule    TAKE 1 CAPSULE DAILY    Essential hypertension with goal blood pressure less than 140/90       CALTRATE 600+D 600-400 MG-UNIT Chew   Generic drug:  calcium carbonate-vitamin D     180 tablet    Take 1 chew tab by mouth 2 times daily    Absence of menstruation       COENZYME Q-10 PO      Take 200 mg by mouth.        cyanocobalamin 1000 MCG tablet    vitamin  B-12     Take by mouth daily        furosemide 20 MG tablet    LASIX    90 tablet    TAKE 1 TABLET DAILY    Essential hypertension with goal  blood pressure less than 140/90       IMODIUM A-D 2 MG tablet   Generic drug:  loperamide      Take 1-2 tablets by mouth once a week        metoprolol tartrate 25 MG tablet    LOPRESSOR    180 tablet    TAKE 1 TABLET TWICE A DAY    Essential hypertension with goal blood pressure less than 140/90       Multi-vitamin Tabs tablet   Generic drug:  multivitamin, therapeutic with minerals      Take 1 tablet by mouth daily.        omeprazole 40 MG capsule    priLOSEC    180 capsule    TAKE 1 CAPSULE TWICE DAILY 30 TO 60 MINUTES BEFORE    MEALS    Gastroesophageal reflux disease, esophagitis presence not specified       * oxyCODONE IR 5 MG tablet    ROXICODONE    12 tablet    Take 1 tablet (5 mg) by mouth every 6 hours as needed for severe pain    Abdominal pain, epigastric       * oxyCODONE IR 5 MG tablet   Start taking on:  6/30/2018    ROXICODONE    100 tablet    Take 5 mg 3-4 times daily for abdominal pain.    Stomach pain       rosuvastatin 20 MG tablet    CRESTOR    90 tablet    Take 1 tablet (20 mg) by mouth daily    Hyperlipidemia LDL goal <130       solifenacin 5 MG tablet    VESICARE    30 tablet    Take 1 tablet (5 mg) by mouth daily    Urinary frequency       * Notice:  This list has 4 medication(s) that are the same as other medications prescribed for you. Read the directions carefully, and ask your doctor or other care provider to review them with you.

## 2018-06-27 NOTE — NURSING NOTE
Health Maintenance Due   Topic Date Due     TETANUS IMMUNIZATION (SYSTEM ASSIGNED)  03/12/1958       Health Maintenance reviewed at today's visit patient asked to schedule/complete:   Patient is aware.

## 2018-06-27 NOTE — PATIENT INSTRUCTIONS
This patient was a no show for Lab today. I have canceled and put the orders in for 1 week. Please contact the patient. Flower MARRERO

## 2018-06-27 NOTE — PROGRESS NOTES
SUBJECTIVE:                                                      Chief Complaint   Patient presents with     Abdominal Pain     recheck- states that she is still having abdominal pain. no pain currently. at its worst 9/10 right after she eats.             Ingrid is a 78 year old patient who is status post esophagectomy who comes in with chronic abdominal pain.  She continues to experience mainly prandial pain.  It is epigastric.  She is gotten into the habit over the last 2-3 years of taking 2.5 or 5 mg of oxycodone prior to eating.  She eats small meals perhaps 3-4 times daily.  100 tabs of Percocet is been lasting about a month, although coming up short by a few days.  Normally she receives 112 through her chronic pain provider when she is living in [place] for half the year.  That seems to work well for her.  Is been no evidence of diversion or misuse.  This is been a stable dose for several years now.    ROS:  Constitutional, HEENT, cardiovascular, pulmonary, gi and gu systems are negative, except as otherwise noted.    OBJECTIVE:                                                    /80 (BP Location: Right arm, Patient Position: Chair, Cuff Size: Adult Regular)  Pulse 63  Temp 97.7  F (36.5  C) (Temporal)  Wt 149 lb 9.6 oz (67.9 kg)  SpO2 99%  BMI 29.22 kg/m2  Body mass index is 29.22 kg/(m^2).    Well-appearing.  Heart regular.  Lungs clear and unlabored.  Abdomen is mildly tender in the epigastrium, otherwise unremarkable.    Diagnostic Test Results:  none      ASSESSMENT/PLAN:                                                        ICD-10-CM    1. Essential hypertension with goal blood pressure less than 140/90 I10 Basic metabolic panel  (Ca, Cl, CO2, Creat, Gluc, K, Na, BUN)     CANCELED: Basic metabolic panel  (Ca, Cl, CO2, Creat, Gluc, K, Na, BUN)   2. Hyperlipidemia LDL goal <130 E78.5 Lipid panel reflex to direct LDL Fasting     CANCELED: Lipid panel reflex to direct LDL Fasting   3. Abdominal  pain, epigastric R10.13 NM Hepatobiliary Scan w GB EF     US Abdomen Limited     DISCONTINUED: oxyCODONE IR (ROXICODONE) 5 MG tablet   4. Chronic pain syndrome G89.4 oxyCODONE IR (ROXICODONE) 5 MG tablet       For her prandial pain, workup to include gallbladder now.  She is an unremarkable CT scan reviewed together.  Also had unremarkable upper endoscopy.  There was no stones on the CT scan.  But we may reveal some dyskinesia.  Blood pressure controlled.  Due for some lab work which is ordered today.  Refill of her chronic pain medications.  See her back after imaging    Alex Berumen MD  [facility]

## 2018-07-01 DIAGNOSIS — K21.9 GASTROESOPHAGEAL REFLUX DISEASE, ESOPHAGITIS PRESENCE NOT SPECIFIED: ICD-10-CM

## 2018-07-02 NOTE — TELEPHONE ENCOUNTER
"Requested Prescriptions   Pending Prescriptions Disp Refills     omeprazole (PRILOSEC) 40 MG capsule [Pharmacy Med Name: OMEPRAZOL DR CAP 40MG RX] 180 capsule 3     Sig: TAKE 1 CAPSULE TWICE DAILY 30 TO 60 MINUTES BEFORE    MEALS    PPI Protocol Failed    7/1/2018  6:28 AM       Failed - No diagnosis of osteoporosis on record       Failed - Recent (12 mo) or future (30 days) visit within the authorizing provider's specialty    Patient had office visit in the last 12 months or has a visit in the next 30 days with authorizing provider or within the authorizing provider's specialty.  See \"Patient Info\" tab in inbasket, or \"Choose Columns\" in Meds & Orders section of the refill encounter.           Passed - Not on Clopidogrel (unless Pantoprazole ordered)       Passed - Patient is age 18 or older       Passed - No active pregnacy on record       Passed - No positive pregnancy test in past 12 months        Last Written Prescription Date:  7/17/17  Last Fill Quantity: 180,  # refills: 3   Last office visit: 5/19/2017 with prescribing provider:     Future Office Visit:      "

## 2018-07-03 RX ORDER — OMEPRAZOLE 40 MG/1
CAPSULE, DELAYED RELEASE ORAL
Qty: 180 CAPSULE | Refills: 3 | Status: SHIPPED | OUTPATIENT
Start: 2018-07-03 | End: 2019-07-15

## 2018-07-03 NOTE — TELEPHONE ENCOUNTER
Prescription approved per [facility] [place].    Fadia Pak RN

## 2018-07-06 ENCOUNTER — TELEPHONE (OUTPATIENT)
Dept: FAMILY MEDICINE | Facility: CLINIC | Age: 78
End: 2018-07-06

## 2018-07-06 NOTE — TELEPHONE ENCOUNTER
Reason for Call:  Other prescription    Detailed comments: [facility] calling to notify provider that the Omeprazole twice daily is not covered under patients current insurance plan, it only covers once daily.    Phone Number Patient can be reached at: Other phone number:  [phone]    Best Time: until 5:30    Can we leave a detailed message on this number? Not Applicable    Call taken on 7/6/2018 at 4:12 PM by Page Conteh

## 2018-07-11 ENCOUNTER — HOSPITAL ENCOUNTER (OUTPATIENT)
Dept: NUCLEAR MEDICINE | Facility: CLINIC | Age: 78
Setting detail: NUCLEAR MEDICINE
Discharge: HOME | End: 2018-07-11
Attending: FAMILY MEDICINE | Admitting: FAMILY MEDICINE
Payer: COMMERCIAL

## 2018-07-11 ENCOUNTER — HOSPITAL ENCOUNTER (OUTPATIENT)
Dept: ULTRASOUND IMAGING | Facility: CLINIC | Age: 78
Discharge: HOME | End: 2018-07-11
Attending: FAMILY MEDICINE | Admitting: FAMILY MEDICINE
Payer: COMMERCIAL

## 2018-07-11 DIAGNOSIS — R10.13 ABDOMINAL PAIN, EPIGASTRIC: ICD-10-CM

## 2018-07-11 PROCEDURE — 25000128 H RX IP 250 OP 636: Performed by: FAMILY MEDICINE

## 2018-07-11 PROCEDURE — 34300033 ZZH RX 343: Performed by: FAMILY MEDICINE

## 2018-07-11 PROCEDURE — 76705 ECHO EXAM OF ABDOMEN: CPT

## 2018-07-11 PROCEDURE — A9537 TC99M MEBROFENIN: HCPCS | Performed by: FAMILY MEDICINE

## 2018-07-11 PROCEDURE — 78227 HEPATOBIL SYST IMAGE W/DRUG: CPT

## 2018-07-11 RX ORDER — KIT FOR THE PREPARATION OF TECHNETIUM TC 99M MEBROFENIN 45 MG/10ML
4.6 INJECTION, POWDER, LYOPHILIZED, FOR SOLUTION INTRAVENOUS ONCE
Status: COMPLETED | OUTPATIENT
Start: 2018-07-11 | End: 2018-07-11

## 2018-07-11 RX ADMIN — SODIUM CHLORIDE 1.4 MCG: 9 INJECTION, SOLUTION INTRAVENOUS at 09:58

## 2018-07-11 RX ADMIN — MEBROFENIN 4.6 MILLICURIE: 45 INJECTION, POWDER, LYOPHILIZED, FOR SOLUTION INTRAVENOUS at 08:43

## 2018-07-12 ENCOUNTER — TELEPHONE (OUTPATIENT)
Dept: FAMILY MEDICINE | Facility: CLINIC | Age: 78
End: 2018-07-12

## 2018-07-12 NOTE — TELEPHONE ENCOUNTER
----- Message from Alex Berumen MD sent at 7/12/2018  3:55 PM CDT -----  Please send letter with results.  Thanks.  Normal gallbladder function.  Your pain is not likely coming from the gallbladder.

## 2018-07-12 NOTE — LETTER
July 12, 2018      Ingrid Garcia  [id number]  [facility] [id number]        Dear ,    We are writing to inform you of your test results.    Normal gallbladder function. Your pain is not likely coming from the gallbladder.     Resulted Orders   NM Hepatobiliary Scan w GB EF    Narrative    NUCLEAR MEDICINE HEPATOBILIARY SCAN WITH GB EF July 11, 2018 10:58 AM     HISTORY: Abdominal pain, epigastric.    TECHNIQUE: 4.6 mCi of technetium 99m labeled Mebrofenin were  intravenously given while dynamically imaging the right upper abdomen.  Approximately one hour later, 1.4 mcg of cholecystokinin (CCK) was  intravenously given over 12 minutes while evaluating the gallbladder  ejection fraction.      COMPARISON:   Nuclear Study: None.    Other Relevant Studies: CT abdomen/pelvis dated 5/25/2018, limited  abdominal ultrasound dated 7/11/2018.    FINDINGS: There is normal, homogeneous uptake of radiotracer in the  liver. The gallbladder is seen by the 15 minute image and the small  bowel is seen by the 40 minute image.    After the administration of CCK, a gallbladder ejection fraction of  79% was measured. The patient described no symptoms with  cholecystokinin infusion.        Impression    IMPRESSION: Negative nuclear medicine hepatobiliary imaging study. No  common bile or cystic duct obstruction is seen. There is a normal  gallbladder ejection fraction.     FITO AMAYA MD       If you have any questions or concerns, please call the clinic at the number listed above.       Sincerely,        Alex Berumen MD

## 2018-07-25 DIAGNOSIS — G89.4 CHRONIC PAIN SYNDROME: ICD-10-CM

## 2018-07-25 NOTE — TELEPHONE ENCOUNTER
Requested Prescriptions   Pending Prescriptions Disp Refills     oxyCODONE IR (ROXICODONE) 5 MG tablet 112 tablet 0     Sig: Take 5 mg 3-4 times daily for abdominal pain.    There is no refill protocol information for this order      Please advise in the absence of PCP  Last Written Prescription Date:  6/30/18  Last Fill Quantity: 112,  # refills: 0   Last office visit: 6/27/2018 with prescribing provider:  6/27/18   Future Office Visit:

## 2018-07-26 RX ORDER — OXYCODONE HYDROCHLORIDE 5 MG/1
TABLET ORAL
Qty: 112 TABLET | Refills: 0 | Status: SHIPPED | OUTPATIENT
Start: 2018-07-26 | End: 2018-08-29

## 2018-07-27 NOTE — TELEPHONE ENCOUNTER
Prescription has been brought to the pharmacy.  No further action is needed as of right now.     Cindy Hoover, CMA

## 2018-08-12 ENCOUNTER — HOSPITAL ENCOUNTER (EMERGENCY)
Facility: CLINIC | Age: 78
Discharge: HOME | End: 2018-08-12
Attending: FAMILY MEDICINE | Admitting: FAMILY MEDICINE
Payer: COMMERCIAL

## 2018-08-12 ENCOUNTER — APPOINTMENT (OUTPATIENT)
Dept: GENERAL RADIOLOGY | Facility: CLINIC | Age: 78
End: 2018-08-12
Attending: FAMILY MEDICINE
Payer: COMMERCIAL

## 2018-08-12 VITALS
RESPIRATION RATE: 14 BRPM | DIASTOLIC BLOOD PRESSURE: 76 MMHG | BODY MASS INDEX: 29.29 KG/M2 | OXYGEN SATURATION: 95 % | SYSTOLIC BLOOD PRESSURE: 160 MMHG | WEIGHT: 150 LBS | HEART RATE: 76 BPM | TEMPERATURE: 98.1 F

## 2018-08-12 DIAGNOSIS — S76.012A STRAIN OF HIP AND THIGH, LEFT, INITIAL ENCOUNTER: ICD-10-CM

## 2018-08-12 DIAGNOSIS — S76.912A STRAIN OF HIP AND THIGH, LEFT, INITIAL ENCOUNTER: ICD-10-CM

## 2018-08-12 PROCEDURE — 73502 X-RAY EXAM HIP UNI 2-3 VIEWS: CPT | Mod: TC

## 2018-08-12 PROCEDURE — 99284 EMERGENCY DEPT VISIT MOD MDM: CPT | Mod: Z6 | Performed by: FAMILY MEDICINE

## 2018-08-12 PROCEDURE — 99283 EMERGENCY DEPT VISIT LOW MDM: CPT | Performed by: FAMILY MEDICINE

## 2018-08-12 RX ORDER — NAPROXEN 500 MG/1
500 TABLET ORAL 2 TIMES DAILY PRN
Qty: 20 TABLET | Refills: 0 | Status: SHIPPED | OUTPATIENT
Start: 2018-08-12 | End: 2024-06-26

## 2018-08-12 NOTE — ED AVS SNAPSHOT
[facility] Emergency Department    [address] [place] [address]    Phone:  [phone]    Fax:  [phone]                                       Ingrid Garcia   MRN: [id number]    Department:  [facility] Emergency Department   Date of Visit:  8/12/2018           Patient Information     Date Of Birth          1940        Your diagnoses for this visit were:     Strain of hip and thigh, left, initial encounter        You were seen by Darshan Cottrell MD.      Follow-up Information     Follow up with Alex Berumen MD. Call in 1 day.    Specialty:  Family Practice    Why:  For follow up on your ED stay    Contact information:    [address] [place] [id number] [phone]        Discharge References/Attachments     HIP STRAIN (ENGLISH)      24 Hour Appointment Hotline       To make an appointment at any [place] clinic, call [phone] ([phone]). If you don't have a family doctor or clinic, we will help you find one. [facility] clinics are conveniently located to serve the needs of you and your family.             Review of your medicines      START taking        Dose / Directions Last dose taken    naproxen 500 MG tablet   Commonly known as:  NAPROSYN   Dose:  500 mg   Quantity:  20 tablet        Take 1 tablet (500 mg) by mouth 2 times daily as needed for moderate pain   Refills:  0          Our records show that you are taking the medicines listed below. If these are incorrect, please call your family doctor or clinic.        Dose / Directions Last dose taken    * albuterol 108 (90 Base) MCG/ACT inhaler   Commonly known as:  PROAIR HFA/PROVENTIL HFA/VENTOLIN HFA   Dose:  2 puff   Quantity:  1 Inhaler        Inhale 2 puffs into the lungs 2 times daily And as needed   Refills:  11        * VENTOLIN  (90 Base) MCG/ACT inhaler   Quantity:  18 g   Generic drug:  albuterol        INHALE 2 PUFFS INTO THE LUNGS TWO TIMES A DAY AND AS NEEDED    Refills:  1        amLODIPine-benazepril 5-20 MG per capsule   Commonly known as:  LOTREL   Quantity:  90 capsule        TAKE 1 CAPSULE DAILY   Refills:  3        CALTRATE 600+D 600-400 MG-UNIT Chew   Dose:  1 chew tab   Quantity:  180 tablet   Generic drug:  calcium carbonate-vitamin D        Take 1 chew tab by mouth 2 times daily   Refills:  3        COENZYME Q-10 PO   Dose:  200 mg        Take 200 mg by mouth.   Refills:  0        cyanocobalamin 1000 MCG tablet   Commonly known as:  vitamin  B-12        Take by mouth daily   Refills:  0        furosemide 20 MG tablet   Commonly known as:  LASIX   Quantity:  90 tablet        TAKE 1 TABLET DAILY   Refills:  0        IMODIUM A-D 2 MG tablet   Dose:  1-2 tablet   Generic drug:  loperamide        Take 1-2 tablets by mouth once a week   Refills:  0        metoprolol tartrate 25 MG tablet   Commonly known as:  LOPRESSOR   Quantity:  180 tablet        TAKE 1 TABLET TWICE A DAY   Refills:  0        Multi-vitamin Tabs tablet   Dose:  1 tablet   Generic drug:  multivitamin, therapeutic with minerals        Take 1 tablet by mouth daily.   Refills:  0        omeprazole 40 MG capsule   Commonly known as:  priLOSEC   Quantity:  180 capsule        TAKE 1 CAPSULE TWICE DAILY 30 TO 60 MINUTES BEFORE    MEALS   Refills:  3        oxyCODONE IR 5 MG tablet   Commonly known as:  ROXICODONE   Quantity:  112 tablet        Take 5 mg 3-4 times daily for abdominal pain.   Refills:  0        rosuvastatin 20 MG tablet   Commonly known as:  CRESTOR   Dose:  20 mg   Quantity:  90 tablet        Take 1 tablet (20 mg) by mouth daily   Refills:  1        solifenacin 5 MG tablet   Commonly known as:  VESICARE   Dose:  5 mg   Quantity:  30 tablet        Take 1 tablet (5 mg) by mouth daily   Refills:  1        * Notice:  This list has 2 medication(s) that are the same as other medications prescribed for you. Read the directions carefully, and ask your doctor or other care provider to review them  with you.            Prescriptions were sent or printed at these locations (1 Prescription)                   [facility], [place] - [address]    [address] , [place] [address]    Telephone:  [phone]   Fax:  [phone]   Hours:                  E-Prescribed (1 of 1)         naproxen (NAPROSYN) 500 MG tablet                Procedures and tests performed during your visit     XR Pelvis w Hip Left 1 View      Orders Needing Specimen Collection     None      Pending Results     No orders found from 8/10/2018 to 8/13/2018.            Pending Culture Results     No orders found from 8/10/2018 to 8/13/2018.            Pending Results Instructions     If you had any lab results that were not finalized at the time of your Discharge, you can call the ED Lab Result RN at [phone]. You will be contacted by this team for any positive Lab results or changes in treatment. The nurses are available 7 days a week from 10A to 6:30P.  You can leave a message 24 hours per day and they will return your call.        Thank you for choosing [facility]       Thank you for choosing [facility] for your care. Our goal is always to provide you with excellent care. Hearing back from our patients is one way we can continue to improve our services. Please take a few minutes to complete the written survey that you may receive in the mail after you visit with us. Thank you!        Care EveryWhere ID     This is your Care EveryWhere ID. This could be used by other organizations to access your [facility] medical records  [id number]        Equal Access to Services     KAL JOHNSTON AH: Jeremias Vargas, waaxda luemilio, qaybta kaalmada chilo, nazia iniguez. So [facility] [phone].    ATENCIÓN: Si habla español, tiene a guillaume disposición servicios gratuitos de asistencia lingüística. Llame al [phone].    We comply with applicable federal civil rights laws and [place]  laws. We do not discriminate on the basis of race, color, national origin, age, disability, sex, sexual orientation, or gender identity.            After Visit Summary       This is your record. Keep this with you and show to your community pharmacist(s) and doctor(s) at your next visit.

## 2018-08-12 NOTE — ED PROVIDER NOTES
History     Chief Complaint   Patient presents with     Leg Pain     HPI  Ingrid Garcia is a 78 year old female who presents with left hip and groin pain after a fall.  Patient states she was sitting on the toilet for prolonged period of time and when she went to stand up her leg was asleep.  She then ended up falling and hitting her left hip area.  Since then she has been having pain whenever she bears weight or if she goes to move her leg.  If she lays still, it is not too bad.  She denies any previous injuries to her hip or pelvis before.  Patient is on a pain contract and takes chronic oxycodone.    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  All refills should only be approved by this provider, or covering partner.    Medication(s): oxy 5  .   Maximum quantity per month: 112   Clinic visit frequency required: Q 3 months     Controlled substance agreement:  Encounter-Level CSA:     There are no encounter-level csa.        Pain Clinic evaluation in the past:     DIRE Total Score(s):  No flowsheet data found.    Last [facility] website verification:     [email or web address]             Essential hypertension with goal blood pressure less than 140/90 06/17/2016     Priority: Medium     Osteoporosis 06/16/2015     Priority: Medium     Moderate persistent asthma 06/24/2014     Priority: Medium     Advanced directives, counseling/discussion 06/18/2014     Priority: Medium     Packet information was given to patient at the appointment.  Hailey Oshea CMA         Heartburn 10/14/2012     Priority: Medium     GERD (gastroesophageal reflux disease) 10/13/2012     Priority: Medium     History of esophagectomy 10/14/2012     Priority: Low      Hyperlipidemia LDL goal <130 10/13/2012     Priority: Low     Personal history of malignant neoplasm of esophagus 10/13/2012     Priority: Low        Past Medical History:    Past Medical History:   Diagnosis Date     Esophageal adenocarcinoma (H) 2011     HTN (hypertension)      Hyperlipidemia      Kidney stones 3/2015     Overactive bladder      Thyroid nodule        Past Surgical History:    Past Surgical History:   Procedure Laterality Date     APPENDECTOMY OPEN  10/12/2012    Procedure: APPENDECTOMY OPEN;;  Surgeon: Sarath Valentin MD;  Location: [facility] OR     ATTEMPTED LAPAROSCOPY  10/12/2012    Procedure: ATTEMPTED LAPAROSCOPY;  Attempted Laparoscopic appendectomy, open appendectomy.;  Surgeon: Sarath Valentin MD;  Location: [facility] OR     COLONOSCOPY  08/27/08     ESOPHAGOGASTRECTOMY  3/2011    [facility]     ESOPHAGOSCOPY, GASTROSCOPY, DUODENOSCOPY (EGD), COMBINED  6/30/2014    Procedure: COMBINED ESOPHAGOSCOPY, GASTROSCOPY, DUODENOSCOPY (EGD);  Surgeon: Estevan Cota MD;  Location: [facility] GI     ESOPHAGOSCOPY, GASTROSCOPY, DUODENOSCOPY (EGD), COMBINED N/A 9/2/2016    Procedure: COMBINED ESOPHAGOSCOPY, GASTROSCOPY, DUODENOSCOPY (EGD);  Surgeon: Juan Antonio Esteban MD;  Location:  GI     ESOPHAGOSCOPY, GASTROSCOPY, DUODENOSCOPY (EGD), DILATATION, COMBINED  6/1/2011    Procedure:COMBINED ESOPHAGOSCOPY, GASTROSCOPY, DUODENOSCOPY (EGD), DILATATION; Surgeon:KITTY BUCK; Location:[facility] GI     HC COLONOSCOPY W/WO BRUSH/WASH  05/24/2004     HC UGI ENDOSCOPY DIAG W BIOPSY  10/28/09     HC UGI ENDOSCOPY DIAG W OR W/O BRUSH/WASH  05/24/2004     HC UGI ENDOSCOPY DIAG W OR W/O BRUSH/WASH  08/10/2005     HC UGI ENDOSCOPY, SIMPLE EXAM  08/27/08       Family History:    No family history on file.    Social History:  Marital Status:   [2]  Social History   Substance Use Topics     Smoking status: Never Smoker     Smokeless tobacco: Never Used     Alcohol use 0.5 oz/week     1 Glasses of wine per  week      Comment: Daily        Medications:      oxyCODONE IR (ROXICODONE) 5 MG tablet   albuterol (PROAIR HFA, PROVENTIL HFA, VENTOLIN HFA) 108 (90 BASE) MCG/ACT inhaler   amLODIPine-benazepril (LOTREL) 5-20 MG per capsule   calcium carbonate-vitamin D (CALTRATE 600+D) 600-400 MG-UNIT CHEW   COENZYME Q-10 PO   cyanocolbalamin (VITAMIN  B-12) 1000 MCG tablet   furosemide (LASIX) 20 MG tablet   loperamide (IMODIUM A-D) 2 MG tablet   metoprolol tartrate (LOPRESSOR) 25 MG tablet   Multiple Vitamin (MULTI-VITAMIN) per tablet   omeprazole (PRILOSEC) 40 MG capsule   rosuvastatin (CRESTOR) 20 MG tablet   solifenacin (VESICARE) 5 MG tablet   VENTOLIN  (90 BASE) MCG/ACT Inhaler         Review of Systems   All other systems reviewed and are negative.      Physical Exam   BP: 168/74  Pulse: 72  Temp: 98.1  F (36.7  C)  Resp: 14  Weight: 68 kg (150 lb)  SpO2: 94 %      Physical Exam   Constitutional: She appears well-developed and well-nourished. No distress.   Musculoskeletal:        Left hip: She exhibits tenderness. She exhibits normal range of motion, normal strength, no bony tenderness, no swelling, no crepitus, no deformity and no laceration.        Left knee: Normal.   Skin: She is not diaphoretic.   Nursing note and vitals reviewed.      ED Course     ED Course     Procedures           Results for orders placed or performed during the hospital encounter of 08/12/18   XR Pelvis w Hip Left 1 View    Narrative    XR PELVIS AND HIP LEFT 1 VIEW 8/12/2018 8:02 AM    HISTORY: Fall.    COMPARISON: None.      Impression    IMPRESSION: No evidence of acute fracture or malalignment.    AVNI BURTON MD     X-ray of the pelvis and hip were normal.  Patient most likely has a muscle strain of her hip.  I told her to continue to ice the area more aggressively.  May give her a short course of some naproxen to use for pain.  She states that she has been using her oxycodone more than she should.  I told her unfortunately I am  unable to prescribe anything more for her because she is on a pain contract and she needs to talk to her primary care doctor about this.  I told her I would send this note to her doctor and they can have a discussion if they felt a refill sooner was warranted.  Patient is safe to be discharged home.    Assessments & Plan (with Medical Decision Making)  Left hip strain     I have reviewed the nursing notes.    I have reviewed the findings, diagnosis, plan and need for follow up with the patient.          8/12/2018   [facility] EMERGENCY DEPARTMENT     Darshan Cottrell MD  08/12/18 0825

## 2018-08-12 NOTE — ED TRIAGE NOTES
"Patient here with upper thigh pain after falling on Friday. Patient was \"sitting on the toilet too long\" and her legs were asleep when she tried to stand they would not hold her. She is having hip to knee pain on the left side with the worst pain to the upper interior thigh.  "

## 2018-08-12 NOTE — ED AVS SNAPSHOT
[facility] Emergency Department    [address] [place] [address]    Phone:  [phone]    Fax:  [phone]                                       Ingrid Garcia   MRN: [id number]    Department:  [facility] Emergency Department   Date of Visit:  8/12/2018           After Visit Summary Signature Page     I have received my discharge instructions, and my questions have been answered. I have discussed any challenges I see with this plan with the nurse or doctor.    ..........................................................................................................................................  Patient/Patient Representative Signature      ..........................................................................................................................................  Patient Representative Print Name and Relationship to Patient    ..................................................               ................................................  Date                                            Time    ..........................................................................................................................................  Reviewed by Signature/Title    ...................................................              ..............................................  Date                                                            Time

## 2018-08-17 ENCOUNTER — TELEPHONE (OUTPATIENT)
Dept: FAMILY MEDICINE | Facility: CLINIC | Age: 78
End: 2018-08-17

## 2018-08-17 DIAGNOSIS — S76.019S STRAIN OF HIP AND THIGH, UNSPECIFIED LATERALITY, SEQUELA: Primary | ICD-10-CM

## 2018-08-17 DIAGNOSIS — S76.919S STRAIN OF HIP AND THIGH, UNSPECIFIED LATERALITY, SEQUELA: Primary | ICD-10-CM

## 2018-08-17 RX ORDER — OXYCODONE HYDROCHLORIDE 5 MG/1
5 TABLET ORAL EVERY 6 HOURS PRN
Qty: 10 TABLET | Refills: 0 | Status: SHIPPED | OUTPATIENT
Start: 2018-08-17 | End: 2019-05-02

## 2018-08-17 NOTE — TELEPHONE ENCOUNTER
Called patient and relayed information.  Appointment is set up for her to see Dr. Berumen.  Patient would like the prescription brought to the pharmacy here.  Script was placed on Dr. Golden's desk to sign and will be walked to the pharmacy.  NO further action is needed as of right now.     Cindy Hoover, CMA

## 2018-08-17 NOTE — TELEPHONE ENCOUNTER
Reason for Call:  Medication or medication refill:    Do you use a [facility]?  Name of the pharmacy and phone number for the current request:  [facility]- [phone]    Name of the medication requested: Patient states she was seen last week for hip/leg pain due to a fall (pt states Dr advised her that she damaged tendons in her thigh), requesting a covering provider to prescribe pain meds.  Patient was advised PCP is not in clinic and states she's almost out of the oxycodone she uses for her stomach pain.  Please advise.    Other request:     Can we leave a detailed message on this number? YES    Phone number patient can be reached at: Home number on file [phone] (home)    Best Time:     Call taken on 8/17/2018 at 8:44 AM by Kristina Marley

## 2018-08-17 NOTE — TELEPHONE ENCOUNTER
Sending to Dr. Golden are you able to address this in absence of Dr. Berumen? Looks like this patient sees both of you.   Tina Joya MA

## 2018-08-29 ENCOUNTER — OFFICE VISIT (OUTPATIENT)
Dept: FAMILY MEDICINE | Facility: CLINIC | Age: 78
End: 2018-08-29
Payer: COMMERCIAL

## 2018-08-29 VITALS
HEART RATE: 88 BPM | SYSTOLIC BLOOD PRESSURE: 132 MMHG | DIASTOLIC BLOOD PRESSURE: 60 MMHG | RESPIRATION RATE: 16 BRPM | OXYGEN SATURATION: 98 % | TEMPERATURE: 98.4 F | BODY MASS INDEX: 28.28 KG/M2 | WEIGHT: 144.8 LBS

## 2018-08-29 DIAGNOSIS — S76.019S STRAIN OF HIP AND THIGH, UNSPECIFIED LATERALITY, SEQUELA: ICD-10-CM

## 2018-08-29 DIAGNOSIS — S76.919S STRAIN OF HIP AND THIGH, UNSPECIFIED LATERALITY, SEQUELA: ICD-10-CM

## 2018-08-29 DIAGNOSIS — G89.4 CHRONIC PAIN SYNDROME: ICD-10-CM

## 2018-08-29 PROCEDURE — 99213 OFFICE O/P EST LOW 20 MIN: CPT | Performed by: FAMILY MEDICINE

## 2018-08-29 RX ORDER — OXYCODONE HYDROCHLORIDE 5 MG/1
TABLET ORAL
Qty: 112 TABLET | Refills: 0 | Status: SHIPPED | OUTPATIENT
Start: 2018-08-29 | End: 2018-09-21

## 2018-08-29 ASSESSMENT — ANXIETY QUESTIONNAIRES
3. WORRYING TOO MUCH ABOUT DIFFERENT THINGS: NOT AT ALL
2. NOT BEING ABLE TO STOP OR CONTROL WORRYING: NOT AT ALL
IF YOU CHECKED OFF ANY PROBLEMS ON THIS QUESTIONNAIRE, HOW DIFFICULT HAVE THESE PROBLEMS MADE IT FOR YOU TO DO YOUR WORK, TAKE CARE OF THINGS AT HOME, OR GET ALONG WITH OTHER PEOPLE: NOT DIFFICULT AT ALL
1. FEELING NERVOUS, ANXIOUS, OR ON EDGE: NEARLY EVERY DAY
GAD7 TOTAL SCORE: 4
5. BEING SO RESTLESS THAT IT IS HARD TO SIT STILL: SEVERAL DAYS
7. FEELING AFRAID AS IF SOMETHING AWFUL MIGHT HAPPEN: NOT AT ALL
6. BECOMING EASILY ANNOYED OR IRRITABLE: NOT AT ALL

## 2018-08-29 ASSESSMENT — PATIENT HEALTH QUESTIONNAIRE - PHQ9: 5. POOR APPETITE OR OVEREATING: NOT AT ALL

## 2018-08-29 ASSESSMENT — PAIN SCALES - GENERAL: PAINLEVEL: WORST PAIN (10)

## 2018-08-29 NOTE — MR AVS SNAPSHOT
After Visit Summary   8/29/2018    Ingrid Garcia    MRN: [id number]           Patient Information     Date Of Birth          1940        Visit Information        Provider Department      8/29/2018 3:00 PM Alex Berumen MD [facility]        Today's Diagnoses     Strain of hip and thigh, unspecified laterality, sequela        Chronic pain syndrome           Follow-ups after your visit        Who to contact     If you have questions or need follow up information about today's clinic visit or your schedule please contact [facility] directly at [phone].  Normal or non-critical lab and imaging results will be communicated to you by MyChart, letter or phone within 4 business days after the clinic has received the results. If you do not hear from us within 7 days, please contact the clinic through MyChart or phone. If you have a critical or abnormal lab result, we will notify you by phone as soon as possible.  Submit refill requests through [employer] or call your pharmacy and they will forward the refill request to us. Please allow 3 business days for your refill to be completed.          Additional Information About Your Visit        Care EveryWhere ID     This is your Care EveryWhere ID. This could be used by other organizations to access your [place] medical records  [id number]        Your Vitals Were     Pulse Temperature Respirations Pulse Oximetry Breastfeeding? BMI (Body Mass Index)    88 98.4  F (36.9  C) (Temporal) 16 98% No 28.28 kg/m2       Blood Pressure from Last 3 Encounters:   08/29/18 132/60   08/12/18 160/76   06/27/18 116/80    Weight from Last 3 Encounters:   08/29/18 144 lb 12.8 oz (65.7 kg)   08/12/18 150 lb (68 kg)   06/27/18 149 lb 9.6 oz (67.9 kg)              Today, you had the following     No orders found for display         Where to get your medicines      Some of these will need a paper prescription and others can be bought  over the counter.  Ask your nurse if you have questions.     Bring a paper prescription for each of these medications     oxyCODONE IR 5 MG tablet         Information about OPIOIDS     PRESCRIPTION OPIOIDS: WHAT YOU NEED TO KNOW   We gave you an opioid (narcotic) pain medicine. It is important to manage your pain, but opioids are not always the best choice. You should first try all the other options your care team gave you. Take this medicine for as short a time (and as few doses) as possible.    Some activities can increase your pain, such as bandage changes or therapy sessions. It may help to take your pain medicine 30 to 60 minutes before these activities. Reduce your stress by getting enough sleep, working on hobbies you enjoy and practicing relaxation or meditation. Talk to your care team about ways to manage your pain beyond prescription opioids.    These medicines have risks:    DO NOT drive when on new or higher doses of pain medicine. These medicines can affect your alertness and reaction times, and you could be arrested for driving under the influence (DUI). If you need to use opioids long-term, talk to your care team about driving.    DO NOT operate heavy machinery    DO NOT do any other dangerous activities while taking these medicines.    DO NOT drink any alcohol while taking these medicines.     If the opioid prescribed includes acetaminophen, DO NOT take with any other medicines that contain acetaminophen. Read all labels carefully. Look for the word  acetaminophen  or  Tylenol.  Ask your pharmacist if you have questions or are unsure.    You can get addicted to pain medicines, especially if you have a history of addiction (chemical, alcohol or substance dependence). Talk to your care team about ways to reduce this risk.    All opioids tend to cause constipation. Drink plenty of water and eat foods that have a lot of fiber, such as fruits, vegetables, prune juice, apple juice and high-fiber cereal.  Take a laxative (Miralax, milk of magnesia, Colace, Senna) if you don t move your bowels at least every other day. Other side effects include upset stomach, sleepiness, dizziness, throwing up, tolerance (needing more of the medicine to have the same effect), physical dependence and slowed breathing.    Store your pills in a secure place, locked if possible. We will not replace any lost or stolen medicine. If you don t finish your medicine, please throw away (dispose) as directed by your pharmacist. The [place] Pollution [employer] has more information about safe disposal: https://www.pca.[facility].[place].us/living-green/managing-unwanted-medications         Primary Care Provider Office Phone # Fax #    Alex Berumen [phone] [address] [place] [address]        Equal Access to Services     [facility] : Hadii dayana mabry hadasho Soomaali, waaxda luqadaha, qaybta kaalmada adeellieyada, nazia villavicencio . So [facility] [phone].    ATENCIÓN: Si habla español, tiene a guillaume disposición servicios gratuitos de asistencia lingüística. Llame al [phone].    We comply with applicable federal civil rights laws and [place] laws. We do not discriminate on the basis of race, color, national origin, age, disability, sex, sexual orientation, or gender identity.            Thank you!     Thank you for choosing [facility]  for your care. Our goal is always to provide you with excellent care. Hearing back from our patients is one way we can continue to improve our services. Please take a few minutes to complete the written survey that you may receive in the mail after your visit with us. Thank you!             Your Updated Medication List - Protect others around you: Learn how to safely use, store and throw away your medicines at www.disposemymeds.org.          This list is accurate as of 8/29/18  3:48 PM.  Always use your most recent med list.                    Brand Name Dispense Instructions for use Diagnosis    albuterol 108 (90 Base) MCG/ACT inhaler    PROAIR HFA/PROVENTIL HFA/VENTOLIN HFA    1 Inhaler    Inhale 2 puffs into the lungs 2 times daily And as needed    Mild persistent asthma without complication       amLODIPine-benazepril 5-20 MG per capsule    LOTREL    90 capsule    TAKE 1 CAPSULE DAILY    Essential hypertension with goal blood pressure less than 140/90       CALTRATE 600+D 600-400 MG-UNIT Chew   Generic drug:  calcium carbonate-vitamin D     180 tablet    Take 1 chew tab by mouth 2 times daily    Absence of menstruation       COENZYME Q-10 PO      Take 200 mg by mouth.        cyanocobalamin 1000 MCG tablet    vitamin  B-12     Take by mouth daily        furosemide 20 MG tablet    LASIX    90 tablet    TAKE 1 TABLET DAILY    Essential hypertension with goal blood pressure less than 140/90       IMODIUM A-D 2 MG tablet   Generic drug:  loperamide      Take 1-2 tablets by mouth once a week        metoprolol tartrate 25 MG tablet    LOPRESSOR    180 tablet    TAKE 1 TABLET TWICE A DAY    Essential hypertension with goal blood pressure less than 140/90       Multi-vitamin Tabs tablet   Generic drug:  multivitamin, therapeutic with minerals      Take 1 tablet by mouth daily.        naproxen 500 MG tablet    NAPROSYN    20 tablet    Take 1 tablet (500 mg) by mouth 2 times daily as needed for moderate pain        omeprazole 40 MG capsule    priLOSEC    180 capsule    TAKE 1 CAPSULE TWICE DAILY 30 TO 60 MINUTES BEFORE    MEALS    Gastroesophageal reflux disease, esophagitis presence not specified       * oxyCODONE IR 5 MG tablet    ROXICODONE    10 tablet    Take 1 tablet (5 mg) by mouth every 6 hours as needed for severe pain    Strain of hip and thigh, unspecified laterality, sequela       * oxyCODONE IR 5 MG tablet    ROXICODONE    112 tablet    Take 5 mg 3-4 times daily for abdominal pain.    Chronic pain syndrome       rosuvastatin 20 MG tablet     CRESTOR    90 tablet    Take 1 tablet (20 mg) by mouth daily    Hyperlipidemia LDL goal <130       solifenacin 5 MG tablet    VESICARE    30 tablet    Take 1 tablet (5 mg) by mouth daily    Urinary frequency       * Notice:  This list has 2 medication(s) that are the same as other medications prescribed for you. Read the directions carefully, and ask your doctor or other care provider to review them with you.

## 2018-08-29 NOTE — NURSING NOTE
Chief Complaint   Patient presents with     Hospital F/U     Health Maintenance Due   Topic Date Due     TETANUS IMMUNIZATION (SYSTEM ASSIGNED)  03/12/1958     Hailey Oshea CMA

## 2018-08-29 NOTE — PROGRESS NOTES
SUBJECTIVE:                                                      Chief Complaint   Patient presents with     Hospital F/U        ED/UC Followup:    Facility:  [facility]  Date of visit: 8/12/18  Reason for visit: Strain of Hip and Thigh, left  Current Status: Monico Quintero is a 78 year old 3 wks of ongoing l thigh pain after fall in bathroom. Lost her balance. Using heat, ice, oxy. Now up and walking slowly, so some improvmeent.     ROS:      OBJECTIVE:                                                    /60 (BP Location: Left arm, Patient Position: Sitting, Cuff Size: Adult Regular)  Pulse 88  Temp 98.4  F (36.9  C) (Temporal)  Resp 16  Wt 144 lb 12.8 oz (65.7 kg)  SpO2 98%  Breastfeeding? No  BMI 28.28 kg/m2  Body mass index is 28.28 kg/(m^2).    Well. Normal appearing R,LLE. Tender in the ant lower thigh, mildly. Normal ROM knee and hip on the L.          ASSESSMENT/PLAN:                                                        ICD-10-CM    1. Strain of hip and thigh, unspecified laterality, sequela S76.019S     S76.919S    2. Chronic pain syndrome G89.4 oxyCODONE IR (ROXICODONE) 5 MG tablet       Thigh strain. Conservative measures. Give it another week or so, if not better, PT. Refill provided. Gentle return to normal activities    Alex Berumen MD  [facility]

## 2018-08-30 ASSESSMENT — PATIENT HEALTH QUESTIONNAIRE - PHQ9: SUM OF ALL RESPONSES TO PHQ QUESTIONS 1-9: 6

## 2018-08-30 ASSESSMENT — ANXIETY QUESTIONNAIRES: GAD7 TOTAL SCORE: 4

## 2018-09-21 DIAGNOSIS — G89.4 CHRONIC PAIN SYNDROME: ICD-10-CM

## 2018-09-21 NOTE — TELEPHONE ENCOUNTER
Oxycodone   Last Written Prescription Date:  08/29/2018  Last Fill Quantity: 112,  # refills: 0   Last office visit: 8/29/2018 with prescribing provider:  Ata Stallings refill request to provider for review/approval because:  Drug not on the [facility] [place] refill protocol.    Fadia Pak RN

## 2018-09-26 RX ORDER — OXYCODONE HYDROCHLORIDE 5 MG/1
TABLET ORAL
Qty: 112 TABLET | Refills: 0 | Status: SHIPPED | OUTPATIENT
Start: 2018-09-26 | End: 2019-05-02

## 2018-09-26 NOTE — TELEPHONE ENCOUNTER
Pt is calling again to check the status of this refill. She is out and is hoping this can be done ASAP.  Thank you,  Hanna Jefferson- Pt Rep.

## 2018-12-11 DIAGNOSIS — E78.5 HYPERLIPIDEMIA LDL GOAL <130: ICD-10-CM

## 2018-12-12 RX ORDER — ROSUVASTATIN CALCIUM 20 MG/1
TABLET, COATED ORAL
Qty: 90 TABLET | Refills: 1 | Status: SHIPPED | OUTPATIENT
Start: 2018-12-12 | End: 2019-05-02

## 2018-12-12 NOTE — TELEPHONE ENCOUNTER
"Crestor  Last Written Prescription Date:  06/04/2018  Last Fill Quantity: 90,  # refills: 1   Last office visit: 08/29/2018 with prescribing provider:  Ata   Future Office Visit:  None    Requested Prescriptions   Pending Prescriptions Disp Refills     rosuvastatin (CRESTOR) 20 MG tablet [Pharmacy Med Name: ROSUVASTATIN TAB 20MG] 90 tablet 1     Sig: TAKE 1 TABLET DAILY.    Statins Protocol Passed - 12/11/2018  5:33 AM       Passed - LDL on file in past 12 months    Recent Labs   Lab Test 05/30/18  1649   LDL 28          Passed - No abnormal creatine kinase in past 12 months    No lab results found.        Passed - Recent (12 mo) or future (30 days) visit within the authorizing provider's specialty    Patient had office visit in the last 12 months or has a visit in the next 30 days with authorizing provider or within the authorizing provider's specialty.  See \"Patient Info\" tab in inbasket, or \"Choose Columns\" in Meds & Orders section of the refill encounter.         Passed - Patient is age 18 or older       Passed - No active pregnancy on record       Passed - No positive pregnancy test in past 12 months      Fadia Pak RN   "

## 2019-05-02 ENCOUNTER — OFFICE VISIT (OUTPATIENT)
Dept: FAMILY MEDICINE | Facility: CLINIC | Age: 79
End: 2019-05-02
Payer: COMMERCIAL

## 2019-05-02 VITALS
OXYGEN SATURATION: 95 % | SYSTOLIC BLOOD PRESSURE: 132 MMHG | TEMPERATURE: 97.2 F | HEIGHT: 61 IN | DIASTOLIC BLOOD PRESSURE: 82 MMHG | HEART RATE: 102 BPM | WEIGHT: 166 LBS | BODY MASS INDEX: 31.34 KG/M2 | RESPIRATION RATE: 18 BRPM

## 2019-05-02 DIAGNOSIS — G89.4 CHRONIC PAIN SYNDROME: ICD-10-CM

## 2019-05-02 DIAGNOSIS — Z98.890 HISTORY OF ESOPHAGECTOMY: Primary | ICD-10-CM

## 2019-05-02 DIAGNOSIS — Z90.49 HISTORY OF ESOPHAGECTOMY: Primary | ICD-10-CM

## 2019-05-02 DIAGNOSIS — I10 ESSENTIAL HYPERTENSION WITH GOAL BLOOD PRESSURE LESS THAN 140/90: ICD-10-CM

## 2019-05-02 PROCEDURE — 99214 OFFICE O/P EST MOD 30 MIN: CPT | Performed by: FAMILY MEDICINE

## 2019-05-02 RX ORDER — METOPROLOL TARTRATE 25 MG/1
25 TABLET, FILM COATED ORAL 2 TIMES DAILY
Qty: 180 TABLET | Refills: 3 | Status: SHIPPED | OUTPATIENT
Start: 2019-05-02 | End: 2019-09-04

## 2019-05-02 RX ORDER — GABAPENTIN 100 MG/1
100 CAPSULE ORAL DAILY
COMMUNITY
End: 2019-09-25

## 2019-05-02 RX ORDER — OXYCODONE HYDROCHLORIDE 5 MG/1
TABLET ORAL
Qty: 112 TABLET | Refills: 0 | Status: SHIPPED | OUTPATIENT
Start: 2019-05-02 | End: 2019-05-23

## 2019-05-02 ASSESSMENT — PAIN SCALES - GENERAL: PAINLEVEL: EXTREME PAIN (8)

## 2019-05-02 ASSESSMENT — MIFFLIN-ST. JEOR: SCORE: 1165.35

## 2019-05-02 NOTE — PROGRESS NOTES
"  SUBJECTIVE:                                                      Chief Complaint   Patient presents with     GI Problem        Stomach pain , nik Quintero is a 79 year old female who [place] clinic [place] with swallowing.  She is a history of esophagectomy.  Normally she manages this with oxycodone 3 times a day prior to meals.  Lately that therapy has not been working.  She feels the pain might make her a little short of breath.  She has also noted more dyspnea with exertion slowly over the past year, she attributes that to her inactivity.  No associated chest pain.  She also has been diagnosed with asthma, but does not feel tight.    ROS:  Constitutional, HEENT, cardiovascular, pulmonary, gi and gu systems are negative, except as otherwise noted.    OBJECTIVE:                                                    /82   Pulse 102   Temp 97.2  F (36.2  C) (Temporal)   Resp 18   Ht 1.549 m (5' 1\")   Wt 75.3 kg (166 lb)   SpO2 95%   BMI 31.37 kg/m    Body mass index is 31.37 kg/m .    GENERAL: , alert and no distress  NECK: no adenopathy, no asymmetry, masses, or scars and thyroid normal to palpation  RESP: lungs clear to auscultation - no rales, rhonchi or wheezes  CV: regular rate and rhythm, normal S1 S2, no S3 or S4, no murmur, click or rub, no peripheral edema and peripheral pulses strong  ABDOMEN: soft, nontender, no hepatosplenomegaly, no masses and bowel sounds normal  MS: no gross musculoskeletal defects noted, no edema    Diagnostic Test Results:  none      ASSESSMENT/PLAN:                                                        ICD-10-CM    1. History of esophagectomy Z98.890 XR Upper GI w Small Bowel Follow Through    Z90.49 XR Upper GI w Small Bowel Follow Through   2. Essential hypertension with goal blood pressure less than 140/90 I10 metoprolol tartrate (LOPRESSOR) 25 MG tablet   3. Chronic pain syndrome G89.4 XR Upper GI w Small " Bowel Follow Through     oxyCODONE (ROXICODONE) 5 MG tablet     XR Upper GI w Small Bowel Follow Through       Upper GI series to investigate her pain see if we can find a source  Stable blood pressure, refill provided  Stable chronic pain dispensed refill  Return to clinic after investigation and further recommendations to follow        Alex Berumen MD  [facility]

## 2019-05-06 ENCOUNTER — HOSPITAL ENCOUNTER (OUTPATIENT)
Dept: GENERAL RADIOLOGY | Facility: CLINIC | Age: 79
Discharge: HOME | End: 2019-05-06
Attending: FAMILY MEDICINE | Admitting: FAMILY MEDICINE
Payer: COMMERCIAL

## 2019-05-06 DIAGNOSIS — Z90.49 HISTORY OF ESOPHAGECTOMY: ICD-10-CM

## 2019-05-06 DIAGNOSIS — Z98.890 HISTORY OF ESOPHAGECTOMY: ICD-10-CM

## 2019-05-06 PROCEDURE — 74241 XR UPPER GI WITH KUB: CPT | Mod: TC

## 2019-05-09 ENCOUNTER — TELEPHONE (OUTPATIENT)
Dept: FAMILY MEDICINE | Facility: CLINIC | Age: 79
End: 2019-05-09

## 2019-05-09 NOTE — TELEPHONE ENCOUNTER
----- Message from Alex Berumen MD sent at 5/9/2019  8:14 AM CDT -----  Please call the patient with the results.  Her testing looks good.  There are 2 mild narrowing areas present in the esophagus that she has left.  Everything else is functioning well.  Does she recall who did her surgery?  And where?     Alex Berumen MD

## 2019-05-10 NOTE — TELEPHONE ENCOUNTER
Patient was informed that her testing looks good. There are 2 mild narrowing areas present in the esophagus that she has left. Everything else is functioning well. Hailey Oshea, CMA

## 2019-05-10 NOTE — TELEPHONE ENCOUNTER
LYNSEY. Patient states that she does not remember who did the surgery but it was done in [place] at [facility] but [place] has the records because the keep all the information because the are the main location. Patient stated that she is going to stop in at the [facility] today and sign a RODRIGO to have her records from [place] transferred her for Dr. Berumen.    Hailey Oshea, CMA

## 2019-05-23 DIAGNOSIS — G89.4 CHRONIC PAIN SYNDROME: ICD-10-CM

## 2019-05-23 NOTE — TELEPHONE ENCOUNTER
Controlled Substance Refill Request for Oxycodone   Problem List Complete:  Yes   checked in past 3 months?  Yes 05/23/2019    Last Written Prescription Date:  05/02/2019  Last Fill Quantity: 112,  # refills: 0   Last office visit: 5/2/2019 with prescribing provider:  Ata   Future Office Visit:  None    Chronic pain syndrome   Problem Detail     Noted:  5/22/2018   Priority:  Medium   Overview Addendum 6/27/2018 11:46 AM by Alex Berumen MD   Chronic abd pain related to esophagectomy      Patient is followed by Alex Berumen MD for ongoing prescription of pain medication.  All refills should only be approved by this provider, or covering partner.     Medication(s): oxy 5    .   Maximum quantity per month: 112       Clinic visit frequency required: Q 3 months      Controlled substance agreement:      Encounter-Level CSA:      There are no encounter-level csa.          Pain Clinic evaluation in the past:      DIRE Total Score(s):  No flowsheet data found.     Fadia Pak RN

## 2019-05-24 RX ORDER — OXYCODONE HYDROCHLORIDE 5 MG/1
TABLET ORAL
Qty: 112 TABLET | Refills: 0 | Status: SHIPPED | OUTPATIENT
Start: 2019-06-02 | End: 2019-06-24

## 2019-05-28 ENCOUNTER — TELEPHONE (OUTPATIENT)
Dept: FAMILY MEDICINE | Facility: CLINIC | Age: 79
End: 2019-05-28

## 2019-05-28 NOTE — TELEPHONE ENCOUNTER
Ingrid calls asking Dr Berumen if he will release her Oxycontin early, was to be filled on Sunday 6/2/19, she would like to get this on Friday 5/31/19.    Please advise.

## 2019-05-28 NOTE — TELEPHONE ENCOUNTER
I informed pharmacy of the below message and patient.  Nothing further at this time.  Lorraine Erickson, CMA

## 2019-05-30 DIAGNOSIS — I10 ESSENTIAL HYPERTENSION WITH GOAL BLOOD PRESSURE LESS THAN 140/90: ICD-10-CM

## 2019-05-30 DIAGNOSIS — E78.5 HYPERLIPIDEMIA LDL GOAL <130: ICD-10-CM

## 2019-05-30 RX ORDER — ROSUVASTATIN CALCIUM 20 MG/1
TABLET, COATED ORAL
Qty: 90 TABLET | Refills: 1 | Status: SHIPPED | OUTPATIENT
Start: 2019-05-30 | End: 2019-09-25

## 2019-05-30 RX ORDER — AMLODIPINE AND BENAZEPRIL HYDROCHLORIDE 5; 20 MG/1; MG/1
CAPSULE ORAL
Qty: 90 CAPSULE | Refills: 1 | Status: SHIPPED | OUTPATIENT
Start: 2019-05-30 | End: 2019-09-25

## 2019-05-30 NOTE — TELEPHONE ENCOUNTER
"Crestor  Last Written Prescription Date:  06/04/2018  Last Fill Quantity: 90,  # refills: 1   Last office visit: 05/02/2019 with prescribing provider:  Ata   Future Office Visit:  None  Prescription approved per [facility] Refill Protocol.    Amlodipine-Benazepril  Last Written Prescription Date:  06/04/2018  Last Fill Quantity: 90,  # refills: 3   Last office visit: 05/02/2019 with prescribing provider:  Ata   Future Office Visit:  None  Prescription approved per [facility] Refill Protocol.    Requested Prescriptions   Pending Prescriptions Disp Refills     amLODIPine-benazepril (LOTREL) 5-20 MG capsule [Pharmacy Med Name: AMLOD/BENAZE CAP 5-20MG] 90 capsule 3     Sig: TAKE 1 CAPSULE DAILY       Calcium Channel Blockers Protocol  Failed - 5/30/2019  6:15 AM        Failed - Normal serum creatinine on file in past 12 months     Recent Labs   Lab Test 05/25/18  0425 05/23/17  1202   CR 0.79  --    CREAT  --  0.8           Passed - Blood pressure under 140/90 in past 12 months     BP Readings from Last 3 Encounters:   05/02/19 132/82   08/29/18 132/60   08/12/18 160/76           Passed - Recent (12 mo) or future (30 days) visit within the authorizing provider's specialty     Patient had office visit in the last 12 months or has a visit in the next 30 days with authorizing provider or within the authorizing provider's specialty.  See \"Patient Info\" tab in inbasket, or \"Choose Columns\" in Meds & Orders section of the refill encounter.          Passed - Medication is active on med list        Passed - Patient is age 18 or older        Passed - No active pregnancy on record        Passed - No positive pregnancy test in past 12 months        rosuvastatin (CRESTOR) 20 MG tablet [Pharmacy Med Name: ROSUVASTATIN TAB 20MG] 90 tablet 1     Sig: TAKE 1 TABLET DAILY.       Statins Protocol Passed - 5/30/2019  6:15 AM        Passed - LDL on file in past 12 months     Recent Labs   Lab Test 05/30/18  1649   LDL 28           Passed - No " "abnormal creatine kinase in past 12 months     No lab results found.         Passed - Recent (12 mo) or future (30 days) visit within the authorizing provider's specialty     Patient had office visit in the last 12 months or has a visit in the next 30 days with authorizing provider or within the authorizing provider's specialty.  See \"Patient Info\" tab in inbasket, or \"Choose Columns\" in Meds & Orders section of the refill encounter.          Passed - Medication is active on med list        Passed - Patient is age 18 or older        Passed - No active pregnancy on record        Passed - No positive pregnancy test in past 12 months      Fadia Pak RN   "

## 2019-06-24 DIAGNOSIS — G89.4 CHRONIC PAIN SYNDROME: ICD-10-CM

## 2019-06-26 RX ORDER — OXYCODONE HYDROCHLORIDE 5 MG/1
TABLET ORAL
Qty: 112 TABLET | Refills: 0 | Status: SHIPPED | OUTPATIENT
Start: 2019-06-26 | End: 2019-07-24

## 2019-06-26 NOTE — TELEPHONE ENCOUNTER
Pt is calling to check status of this refill. She will be out after today.  Thank you,  Hanna Jefferson- Patient Representative

## 2019-06-26 NOTE — TELEPHONE ENCOUNTER
Controlled Substance Refill Request for Oxycodone.  Problem List Complete:  Yes   checked in past 3 months?  Yes 06/26/2019    Last Written Prescription Date:  06/02/2019  Last Fill Quantity: 112,  # refills: 0   Last office visit: 5/2/2019 with prescribing provider:  Ata   Future Office Visit:  None    Chronic pain syndrome     Noted:  5/22/2018   Priority:  Medium   Overview Addendum 6/27/2018 11:46 AM by Alex Berumen MD   Chronic abd pain related to esophagectomy      Patient is followed by Alex Berumen MD for ongoing prescription of pain medication.  All refills should only be approved by this provider, or covering partner.     Medication(s): oxy 5    .   Maximum quantity per month: 112       Clinic visit frequency required: Q 3 months      Controlled substance agreement:      Encounter-Level CSA:      There are no encounter-level csa.          Pain Clinic evaluation in the past:      DIRE Total Score(s):  No flowsheet data found.     Fadia Pak RN

## 2019-07-15 DIAGNOSIS — K21.9 GASTROESOPHAGEAL REFLUX DISEASE, ESOPHAGITIS PRESENCE NOT SPECIFIED: ICD-10-CM

## 2019-07-15 NOTE — TELEPHONE ENCOUNTER
"Omeprazole  Last Written Prescription Date:  07/03/2018  Last Fill Quantity: 180,  # refills: 3   Last office visit: 05/02/2019 with prescribing provider:  Ata   Future Office Visit:  None  Routing refill request to provider for review/approval because:  Osteoporosis diagnosis on record.     Requested Prescriptions   Pending Prescriptions Disp Refills     omeprazole (PRILOSEC) 40 MG DR capsule [Pharmacy Med Name: OMEPRAZOL DR CAP 40MG RX] 90 capsule 3     Sig: TAKE 1 CAPSULE 20 TO 30    MINUTES BEFORE DINNER       PPI Protocol Failed - 7/15/2019  3:14 PM        Failed - No diagnosis of osteoporosis on record        Passed - Not on Clopidogrel (unless Pantoprazole ordered)        Passed - Recent (12 mo) or future (30 days) visit within the authorizing provider's specialty     Patient had office visit in the last 12 months or has a visit in the next 30 days with authorizing provider or within the authorizing provider's specialty.  See \"Patient Info\" tab in inbasket, or \"Choose Columns\" in Meds & Orders section of the refill encounter.          Passed - Medication is active on med list        Passed - Patient is age 18 or older        Passed - No active pregnacy on record        Passed - No positive pregnancy test in past 12 months      Fadia Pak RN   "

## 2019-07-16 RX ORDER — OMEPRAZOLE 40 MG/1
CAPSULE, DELAYED RELEASE ORAL
Qty: 90 CAPSULE | Refills: 3 | Status: SHIPPED | OUTPATIENT
Start: 2019-07-16 | End: 2019-09-25

## 2019-07-24 DIAGNOSIS — G89.4 CHRONIC PAIN SYNDROME: ICD-10-CM

## 2019-07-25 NOTE — TELEPHONE ENCOUNTER
Oxycodone  Last Written Prescription Date:  06/26/2019  Last Fill Quantity: 112,  # refills: 0   Last office visit: 5/2/2019 with prescribing provider:  Ata   Future Office Visit:  None    Requested Prescriptions   Pending Prescriptions Disp Refills     oxyCODONE (ROXICODONE) 5 MG tablet 112 tablet 0     Sig: Take 5 mg 3-4 times daily as needed for abdominal pain. 30 day supply       There is no refill protocol information for this order        Routing refill request to provider for review/approval because:  Drug not on the [facility] refill protocol     Fadia Pak RN

## 2019-07-26 RX ORDER — OXYCODONE HYDROCHLORIDE 5 MG/1
TABLET ORAL
Qty: 112 TABLET | Refills: 0 | Status: SHIPPED | OUTPATIENT
Start: 2019-07-26 | End: 2019-08-20

## 2019-08-20 DIAGNOSIS — G89.4 CHRONIC PAIN SYNDROME: ICD-10-CM

## 2019-08-20 NOTE — TELEPHONE ENCOUNTER
Oxycodone 5 MG       Last Written Prescription Date:  7/26/19  Last Fill Quantity: 112,   # refills: 0  Last Office Visit: 5/2/19  Future Office visit:       Routing refill request to provider for review/approval because:  Drug not on the [facility] refill protocol or controlled substance     no

## 2019-08-21 RX ORDER — OXYCODONE HYDROCHLORIDE 5 MG/1
TABLET ORAL
Qty: 112 TABLET | Refills: 0 | Status: SHIPPED | OUTPATIENT
Start: 2019-08-28 | End: 2019-09-25

## 2019-08-23 ENCOUNTER — OFFICE VISIT (OUTPATIENT)
Dept: FAMILY MEDICINE | Facility: CLINIC | Age: 79
End: 2019-08-23
Payer: COMMERCIAL

## 2019-08-23 VITALS
OXYGEN SATURATION: 94 % | SYSTOLIC BLOOD PRESSURE: 132 MMHG | HEART RATE: 86 BPM | DIASTOLIC BLOOD PRESSURE: 74 MMHG | TEMPERATURE: 97 F | BODY MASS INDEX: 30.91 KG/M2 | RESPIRATION RATE: 20 BRPM | WEIGHT: 163.6 LBS

## 2019-08-23 DIAGNOSIS — J20.9 ACUTE BRONCHITIS, UNSPECIFIED ORGANISM: Primary | ICD-10-CM

## 2019-08-23 PROCEDURE — 99213 OFFICE O/P EST LOW 20 MIN: CPT | Performed by: NURSE PRACTITIONER

## 2019-08-23 RX ORDER — AZITHROMYCIN 250 MG/1
TABLET, FILM COATED ORAL
Qty: 6 TABLET | Refills: 0 | Status: SHIPPED | OUTPATIENT
Start: 2019-08-23 | End: 2019-09-25

## 2019-08-23 RX ORDER — CODEINE PHOSPHATE AND GUAIFENESIN 10; 100 MG/5ML; MG/5ML
2 SOLUTION ORAL
Qty: 118 ML | Refills: 0 | Status: SHIPPED | OUTPATIENT
Start: 2019-08-23 | End: 2019-09-25

## 2019-08-23 ASSESSMENT — PAIN SCALES - GENERAL: PAINLEVEL: NO PAIN (0)

## 2019-08-23 NOTE — PROGRESS NOTES
Subjective     Ingrid Garcia is a 79 year old female who presents to clinic today for the following health issues:    Ingrid comes in today to be assessed for upper respiratory congestion and cough. She has been struggling with the cough, chills, body aches and fatigue for 4 weeks.  stated he had been trying to get her in for 2 weeks.     She has not been running a high fever but she is coughing up green/yellow sputum. She has been using lots of over the counter mucinex and drinking lots of green tea so the sputum has stayed thin and she is coughing it up. The cough is now really affecting her sleep which is causing her to feel extremely run down.     No other new symptoms of concern.     HPI   Acute Illness   Acute illness concerns: Cough and congestion   Onset: 4 weeks     Fever: no    Chills/Sweats: YES    Headache (location?): no    Sinus Pressure:no    Conjunctivitis:  no    Ear Pain: no    Rhinorrhea: no    Congestion: YES    Sore Throat: no     Cough: YES-productive of green sputum    Wheeze: YES    Decreased Appetite: YES    Nausea: no    Vomiting: no    Diarrhea:  no    Dysuria/Freq.: no    Fatigue/Achiness: YES    Sick/Strep Exposure: no      Therapies Tried and outcome: OTC, no help         Patient Active Problem List   Diagnosis     S/P appendectomy     Hyperlipidemia LDL goal <130     GERD (gastroesophageal reflux disease)     Personal history of malignant neoplasm of esophagus     Heartburn     History of esophagectomy     Advanced directives, counseling/discussion     Moderate persistent asthma     Osteoporosis     Essential hypertension with goal blood pressure less than 140/90     Chronic pain syndrome     Past Surgical History:   Procedure Laterality Date     APPENDECTOMY OPEN  10/12/2012    Procedure: APPENDECTOMY OPEN;;  Surgeon: Sarath Valentin MD;  Location: [facility]     ATTEMPTED LAPAROSCOPY  10/12/2012    Procedure: ATTEMPTED LAPAROSCOPY;  Attempted Laparoscopic appendectomy,  open appendectomy.;  Surgeon: Sarath Valentin MD;  Location: [facility]     COLONOSCOPY  08/27/08     ESOPHAGOGASTRECTOMY  3/2011    [facility]     ESOPHAGOSCOPY, GASTROSCOPY, DUODENOSCOPY (EGD), COMBINED  6/30/2014    Procedure: COMBINED ESOPHAGOSCOPY, GASTROSCOPY, DUODENOSCOPY (EGD);  Surgeon: Estevan Cota MD;  Location: [facility]     ESOPHAGOSCOPY, GASTROSCOPY, DUODENOSCOPY (EGD), COMBINED N/A 9/2/2016    Procedure: COMBINED ESOPHAGOSCOPY, GASTROSCOPY, DUODENOSCOPY (EGD);  Surgeon: Juan Antonio Esteban MD;  Location: [facility]     ESOPHAGOSCOPY, GASTROSCOPY, DUODENOSCOPY (EGD), DILATATION, COMBINED  6/1/2011    Procedure:COMBINED ESOPHAGOSCOPY, GASTROSCOPY, DUODENOSCOPY (EGD), DILATATION; Surgeon:KITTY BUCK; Location:[facility]     HC COLONOSCOPY W/WO BRUSH/WASH  05/24/2004     HC UGI ENDOSCOPY DIAG W BIOPSY  10/28/09     HC UGI ENDOSCOPY DIAG W OR W/O BRUSH/WASH  05/24/2004     HC UGI ENDOSCOPY DIAG W OR W/O BRUSH/WASH  08/10/2005     HC UGI ENDOSCOPY, SIMPLE EXAM  08/27/08       Social History     Tobacco Use     Smoking status: Never Smoker     Smokeless tobacco: Never Used   Substance Use Topics     Alcohol use: Yes     Alcohol/week: 0.5 oz     Types: 1 Glasses of wine per week     Comment: Daily     No family history on file.      Current Outpatient Medications   Medication Sig Dispense Refill     albuterol (PROAIR HFA, PROVENTIL HFA, VENTOLIN HFA) 108 (90 BASE) MCG/ACT inhaler Inhale 2 puffs into the lungs 2 times daily And as needed 1 Inhaler 11     amLODIPine-benazepril (LOTREL) 5-20 MG capsule TAKE 1 CAPSULE DAILY 90 capsule 1     amLODIPine-benazepril (LOTREL) 5-20 MG per capsule TAKE 1 CAPSULE DAILY 90 capsule 3     azithromycin (ZITHROMAX) 250 MG tablet Take 2 tablets (500 mg) by mouth daily for 1 day, THEN 1 tablet (250 mg) daily for 4 days. 6 tablet 0     calcium carbonate-vitamin D (CALTRATE 600+D) 600-400 MG-UNIT CHEW Take 1 chew tab by mouth 2 times daily 180 tablet 3     COENZYME Q-10 PO  Take 200 mg by mouth.       cyanocolbalamin (VITAMIN  B-12) 1000 MCG tablet Take by mouth daily       furosemide (LASIX) 20 MG tablet TAKE 1 TABLET DAILY 90 tablet 3     gabapentin (NEURONTIN) 100 MG capsule Take 100 mg by mouth daily Take 2 tabs once a day       guaiFENesin-codeine (ROBITUSSIN AC) 100-10 MG/5ML solution Take 10 mLs by mouth nightly as needed for cough 118 mL 0     loperamide (IMODIUM A-D) 2 MG tablet Take 1-2 tablets by mouth once a week       metoprolol tartrate (LOPRESSOR) 25 MG tablet Take 1 tablet (25 mg) by mouth 2 times daily 180 tablet 3     Multiple Vitamin (MULTI-VITAMIN) per tablet Take 1 tablet by mouth daily.       naproxen (NAPROSYN) 500 MG tablet Take 1 tablet (500 mg) by mouth 2 times daily as needed for moderate pain 20 tablet 0     omeprazole (PRILOSEC) 40 MG DR capsule TAKE 1 CAPSULE 20 TO 30    MINUTES BEFORE DINNER 90 capsule 3     [START ON 8/28/2019] oxyCODONE (ROXICODONE) 5 MG tablet Take 5 mg 3-4 times daily as needed for abdominal pain. 30 day supply 112 tablet 0     rosuvastatin (CRESTOR) 20 MG tablet TAKE 1 TABLET DAILY. 90 tablet 1     rosuvastatin (CRESTOR) 20 MG tablet Take 1 tablet (20 mg) by mouth daily 90 tablet 1     Allergies   Allergen Reactions     Morphine Sulfate Other (See Comments) and Nausea     Headache         Reviewed and updated as needed this visit by Provider         Review of Systems   ROS COMP: Constitutional, HEENT, cardiovascular, pulmonary, gi and gu systems are negative, except as otherwise noted.      Objective    /74   Pulse 86   Temp 97  F (36.1  C) (Temporal)   Resp 20   Wt 74.2 kg (163 lb 9.6 oz)   SpO2 94%   BMI 30.91 kg/m    Body mass index is 30.91 kg/m .  Physical Exam   GENERAL: alert, mild distress, elderly and fatigued  NECK: no adenopathy, no asymmetry, masses, or scars and thyroid normal to palpation  RESP: lungs clear to auscultation - but diminished, harsh cough during exam,  no rales, rhonchi or wheezes  CV: regular  rate and rhythm, normal S1 S2  ABDOMEN: soft, nontender  MS: no gross musculoskeletal defects noted, no edema    Diagnostic Test Results:  Labs reviewed in Epic        Assessment & Plan     1. Acute bronchitis, unspecified organism  - Reviewed home care and provided detailed instructions.  - Reviewed signs and symptoms to report to clinic or ER if symptoms become worse.   - azithromycin (ZITHROMAX) 250 MG tablet; Take 2 tablets (500 mg) by mouth daily for 1 day, THEN 1 tablet (250 mg) daily for 4 days.  Dispense: 6 tablet; Refill: 0  - guaiFENesin-codeine (ROBITUSSIN AC) 100-10 MG/5ML solution; Take 10 mLs by mouth nightly as needed for cough  Dispense: 118 mL; Refill: 0     Return if symptoms worsen or fail to improve.    Pelon Britt NP  [facility]

## 2019-08-23 NOTE — PATIENT INSTRUCTIONS
I will start you on an antibiotic treatment.     Keep taking the over the counter Mucinex during the day.     Use the prescription only before bed to help you sleep.     Push fluids. 6-8 glasses of fluid a day.     Drink lots of warm Honey/Lemon water.     Call clinic with any worsening symptoms. If symptoms get significantly worse over the weekend go to ER     Stay out of trouble.       Patient Education     Bronchitis, Antibiotic Treatment (Adult)    Bronchitis is an infection of the air passages (bronchial tubes) in your lungs. It often occurs when you have a cold. This illness is contagious during the first few days and is spread through the air by coughing and sneezing, or by direct contact (touching the sick person and then touching your own eyes, nose, or mouth).  Symptoms of bronchitis include cough with mucus (phlegm) and low-grade fever. Bronchitis usually lasts 7 to 14 days. Mild cases can be treated with simple home remedies. More severe infection is treated with an antibiotic.  Home care  Follow these guidelines when caring for yourself at home:    If your symptoms are severe, rest at home for the first 2 to 3 days. When you go back to your usual activities, don't let yourself get too tired.    Don't smoke. Also stay away from secondhand smoke.    You may use over-the-counter medicines to control fever or pain, unless another medicine was prescribed. If you have chronic liver or kidney disease or have ever had a stomach ulcer or gastrointestinal bleeding, talk with your healthcare provider before using these medicines. Also talk to your provider if you are taking medicine to prevent blood clots. Aspirin should never be given to anyone younger than 18 who is ill with a viral infection or fever. It may cause severe liver or brain damage.    Your appetite may be low, so a light diet is fine. Stay well hydrated by drinking 6 to 8 glasses of fluids per day. This includes water, soft drinks, sports drinks,  juices, tea, or soup. Extra fluids will help loosen mucus in your nose and lungs.    Over-the-counter cough, cold, and sore-throat medicines will not shorten the length of the illness, but they may be helpful to reduce your symptoms. Don't use decongestants if you have high blood pressure.    Finish all antibiotic medicine. Do this even if you are feeling better after only a few days.  Follow-up care  Follow up with your healthcare provider, or as advised. If you had an X-ray or ECG (electrocardiogram), a specialist will review it. You will be told of any new test results that may affect your care.  If you are age 65 or older, if you smoke, or if you have a chronic lung disease or condition that affects your immune system, ask your healthcare provider about getting a pneumococcal vaccine and a yearly flu shot (influenza vaccine).  When to seek medical advice  Call your healthcare provider right away if any of these occur:    Fever of 100.4 F (38 C) or higher, or as directed by your healthcare provider    Coughing up more sputum    Weakness, drowsiness, headache, facial pain, ear pain, or a stiff neck  Call 911  Call 911 if any of these occur.    Coughing up blood    Weakness, drowsiness, headache, or stiff neck that get worse    Trouble breathing, wheezing, or pain with breathing  Date Last Reviewed: 6/1/2018 2000-2018 [employer]. [address] [place] [address]. All rights reserved. This information is not intended as a substitute for professional medical care. Always follow your healthcare professional's instructions.

## 2019-09-03 DIAGNOSIS — I10 ESSENTIAL HYPERTENSION WITH GOAL BLOOD PRESSURE LESS THAN 140/90: ICD-10-CM

## 2019-09-04 RX ORDER — METOPROLOL TARTRATE 25 MG/1
25 TABLET, FILM COATED ORAL 2 TIMES DAILY
Qty: 180 TABLET | Refills: 2 | Status: SHIPPED | OUTPATIENT
Start: 2019-09-04 | End: 2020-07-15

## 2019-09-04 RX ORDER — FUROSEMIDE 20 MG
20 TABLET ORAL DAILY
Qty: 30 TABLET | Refills: 0 | Status: SHIPPED | OUTPATIENT
Start: 2019-09-04 | End: 2019-09-25

## 2019-09-04 NOTE — TELEPHONE ENCOUNTER
"Requested Prescriptions   Pending Prescriptions Disp Refills     furosemide (LASIX) 20 MG tablet 90 tablet 3     Sig: Take 1 tablet (20 mg) by mouth daily   Last Written Prescription Date:  8/16/20118  Last Fill Quantity: 90,  # refills: 3   Last office visit: 5/2/2019 with prescribing provider:     Future Office Visit:        Diuretics (Including Combos) Protocol Failed - 9/3/2019  9:16 AM        Failed - Normal serum creatinine on file in past 12 months     Recent Labs   Lab Test 05/25/18  0425   CR 0.79            Failed - Normal serum potassium on file in past 12 months     Recent Labs   Lab Test 05/30/18  1649   POTASSIUM 3.5              Failed - Normal serum sodium on file in past 12 months     Recent Labs   Lab Test 05/25/18  0425               Passed - Blood pressure under 140/90 in past 12 months     BP Readings from Last 3 Encounters:   08/23/19 132/74   05/02/19 132/82   08/29/18 132/60           Passed - Recent (12 mo) or future (30 days) visit within the authorizing provider's specialty     Patient had office visit in the last 12 months or has a visit in the next 30 days with authorizing provider or within the authorizing provider's specialty.  See \"Patient Info\" tab in inbasket, or \"Choose Columns\" in Meds & Orders section of the refill encounter.            Passed - Medication is active on med list        Passed - Patient is age 18 or older        Passed - No active pregancy on record        Passed - No positive pregnancy test in past 12 months        metoprolol tartrate (LOPRESSOR) 25 MG tablet 180 tablet 3     Sig: Take 1 tablet (25 mg) by mouth 2 times daily   Last Written Prescription Date:  5/2/2019  Last Fill Quantity: 180,  # refills: 3   Last office visit: 8/23/2019 with prescribing provider:     Future Office Visit:        Beta-Blockers Protocol Passed - 9/3/2019  9:16 AM        Passed - Blood pressure under 140/90 in past 12 months     BP Readings from Last 3 Encounters:   08/23/19 " "132/74   05/02/19 132/82   08/29/18 132/60           Passed - Patient is age 6 or older        Passed - Recent (12 mo) or future (30 days) visit within the authorizing provider's specialty     Patient had office visit in the last 12 months or has a visit in the next 30 days with authorizing provider or within the authorizing provider's specialty.  See \"Patient Info\" tab in inbasket, or \"Choose Columns\" in Meds & Orders section of the refill encounter.              Passed - Medication is active on med list      Sending remaining refills to new pharmacy change requested    "

## 2019-09-04 NOTE — TELEPHONE ENCOUNTER
furosemide (LASIX) 20 MG tablet 90 tablet 3    Sig: Take 1 tablet (20 mg) by mouth daily   Routing refill request to provider for review/approval because:  Labs not current:  CR, Potassium, NA  T'd up 1 month for provider review.    Julieta Cramer RN

## 2019-09-23 DIAGNOSIS — I10 ESSENTIAL HYPERTENSION WITH GOAL BLOOD PRESSURE LESS THAN 140/90: ICD-10-CM

## 2019-09-23 RX ORDER — FUROSEMIDE 20 MG
TABLET ORAL
Qty: 30 TABLET | Refills: 0 | OUTPATIENT
Start: 2019-09-23

## 2019-09-23 NOTE — TELEPHONE ENCOUNTER
"Lasix  Last Written Prescription Date:  09/04/2019  Last Fill Quantity: 30,  # refills: 0   Last office visit: 8/23/2019 with prescribing provider:  Balwinder   Future Office Visit:   Next 5 appointments (look out 90 days)    Sep 25, 2019  1:00 PM CDT  [facility] [address] [place] [phone] [phone]      Routing refill request to provider for review/approval because:  Labs not current:  Creatinine, potassium, sodium.     Requested Prescriptions   Pending Prescriptions Disp Refills     furosemide (LASIX) 20 MG tablet [Pharmacy Med Name: FUROSEMIDE TAB 20MG] 30 tablet 0     Sig: TAKE 1 TABLET DAILY       Diuretics (Including Combos) Protocol Failed - 9/23/2019  3:28 AM        Failed - Normal serum creatinine on file in past 12 months     Recent Labs   Lab Test 05/25/18  0425   CR 0.79           Failed - Normal serum potassium on file in past 12 months     Recent Labs   Lab Test 05/30/18  1649   POTASSIUM 3.5           Failed - Normal serum sodium on file in past 12 months     Recent Labs   Lab Test 05/25/18  0425              Passed - Blood pressure under 140/90 in past 12 months     BP Readings from Last 3 Encounters:   08/23/19 132/74   05/02/19 132/82   08/29/18 132/60           Passed - Recent (12 mo) or future (30 days) visit within the authorizing provider's specialty     Patient had office visit in the last 12 months or has a visit in the next 30 days with authorizing provider or within the authorizing provider's specialty.  See \"Patient Info\" tab in inbasket, or \"Choose Columns\" in Meds & Orders section of the refill encounter.          Passed - Medication is active on med list        Passed - Patient is age 18 or older        Passed - No active pregancy on record        Passed - No positive pregnancy test in past 12 months      Fadia Pak RN   "

## 2019-09-23 NOTE — TELEPHONE ENCOUNTER
I informed patient she has an appt with PCP on 09/25/19 and will come in earlier that day to do labs, he had also placed a lipids so she will be coming in fasting.    RN please delete pended med and close enc.    Lorraine Erickson, CMA

## 2019-09-25 ENCOUNTER — OFFICE VISIT (OUTPATIENT)
Dept: FAMILY MEDICINE | Facility: CLINIC | Age: 79
End: 2019-09-25
Payer: COMMERCIAL

## 2019-09-25 VITALS
RESPIRATION RATE: 18 BRPM | TEMPERATURE: 97.3 F | SYSTOLIC BLOOD PRESSURE: 122 MMHG | OXYGEN SATURATION: 94 % | HEART RATE: 82 BPM | DIASTOLIC BLOOD PRESSURE: 80 MMHG | BODY MASS INDEX: 30.61 KG/M2 | WEIGHT: 162 LBS

## 2019-09-25 DIAGNOSIS — I10 ESSENTIAL HYPERTENSION WITH GOAL BLOOD PRESSURE LESS THAN 140/90: ICD-10-CM

## 2019-09-25 DIAGNOSIS — E78.5 HYPERLIPIDEMIA LDL GOAL <130: ICD-10-CM

## 2019-09-25 DIAGNOSIS — K21.9 GASTROESOPHAGEAL REFLUX DISEASE, ESOPHAGITIS PRESENCE NOT SPECIFIED: ICD-10-CM

## 2019-09-25 DIAGNOSIS — G89.4 CHRONIC PAIN SYNDROME: ICD-10-CM

## 2019-09-25 DIAGNOSIS — R73.9 ELEVATED BLOOD SUGAR: Primary | ICD-10-CM

## 2019-09-25 LAB
ANION GAP SERPL CALCULATED.3IONS-SCNC: 7 MMOL/L (ref 3–14)
BUN SERPL-MCNC: 13 MG/DL (ref 7–30)
CALCIUM SERPL-MCNC: 9.3 MG/DL (ref 8.5–10.1)
CHLORIDE SERPL-SCNC: 106 MMOL/L (ref 94–109)
CHOLEST SERPL-MCNC: 151 MG/DL
CO2 SERPL-SCNC: 30 MMOL/L (ref 20–32)
CREAT SERPL-MCNC: 0.76 MG/DL (ref 0.52–1.04)
GFR SERPL CREATININE-BSD FRML MDRD: 74 ML/MIN/{1.73_M2}
GLUCOSE SERPL-MCNC: 189 MG/DL (ref 70–99)
HDLC SERPL-MCNC: 52 MG/DL
LDLC SERPL CALC-MCNC: ABNORMAL MG/DL
LDLC SERPL DIRECT ASSAY-MCNC: 58 MG/DL
NONHDLC SERPL-MCNC: 99 MG/DL
POTASSIUM SERPL-SCNC: 3.4 MMOL/L (ref 3.4–5.3)
SODIUM SERPL-SCNC: 143 MMOL/L (ref 133–144)
TRIGL SERPL-MCNC: 405 MG/DL

## 2019-09-25 PROCEDURE — 36415 COLL VENOUS BLD VENIPUNCTURE: CPT | Performed by: FAMILY MEDICINE

## 2019-09-25 PROCEDURE — 99214 OFFICE O/P EST MOD 30 MIN: CPT | Performed by: FAMILY MEDICINE

## 2019-09-25 PROCEDURE — 83721 ASSAY OF BLOOD LIPOPROTEIN: CPT | Mod: 91 | Performed by: FAMILY MEDICINE

## 2019-09-25 PROCEDURE — 80061 LIPID PANEL: CPT | Performed by: FAMILY MEDICINE

## 2019-09-25 PROCEDURE — 80048 BASIC METABOLIC PNL TOTAL CA: CPT | Performed by: FAMILY MEDICINE

## 2019-09-25 RX ORDER — FUROSEMIDE 20 MG
20 TABLET ORAL DAILY
Qty: 90 TABLET | Refills: 3 | Status: SHIPPED | OUTPATIENT
Start: 2019-09-25 | End: 2020-08-26

## 2019-09-25 RX ORDER — OXYCODONE HYDROCHLORIDE 5 MG/1
TABLET ORAL
Qty: 112 TABLET | Refills: 0 | Status: SHIPPED | OUTPATIENT
Start: 2019-09-25 | End: 2019-10-23

## 2019-09-25 RX ORDER — AMLODIPINE AND BENAZEPRIL HYDROCHLORIDE 5; 20 MG/1; MG/1
1 CAPSULE ORAL DAILY
Qty: 90 CAPSULE | Refills: 3 | Status: SHIPPED | OUTPATIENT
Start: 2019-09-25 | End: 2020-08-26

## 2019-09-25 RX ORDER — OMEPRAZOLE 40 MG/1
CAPSULE, DELAYED RELEASE ORAL
Qty: 90 CAPSULE | Refills: 3 | Status: SHIPPED | OUTPATIENT
Start: 2019-09-25 | End: 2020-08-26

## 2019-09-25 RX ORDER — ROSUVASTATIN CALCIUM 20 MG/1
20 TABLET, COATED ORAL DAILY
Qty: 90 TABLET | Refills: 3 | Status: SHIPPED | OUTPATIENT
Start: 2019-09-25 | End: 2020-08-26

## 2019-09-25 ASSESSMENT — PAIN SCALES - GENERAL: PAINLEVEL: NO PAIN (0)

## 2019-09-25 NOTE — PROGRESS NOTES
Subjective     Recheck Khadar Quintero is a 79 year old female who comes to clinic for routine recheck.  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  Pain is been controlled on her current regimen of oxycodone.  She has good relief with that.  Dosing has been stable.  Refills been appropriate.  Website monitoring through the drug database is also been appropriate for refills.  She also has had historically well controlled hypertension hyperlipidemia.  She is due for refills.  Her reflux disease has also been controlled.  We have discussed changing her PPI to an H2 blocker, but she has been resistant to that    Review of Systems   ROS COMP: Constitutional, HEENT, cardiovascular, pulmonary, gi and gu systems are negative, except as otherwise noted.      Objective    /80   Pulse 82   Temp 97.3  F (36.3  C) (Temporal)   Resp 18   Wt 73.5 kg (162 lb)   SpO2 94%   BMI 30.61 kg/m       Wt Readings from Last 2 Encounters:   10/02/19 75.8 kg (167 lb)   09/25/19 73.5 kg (162 lb)       Physical Exam   GENERAL: healthy, alert and no distress  NECK: no adenopathy, no asymmetry, masses, or scars and thyroid normal to palpation  RESP: lungs clear to auscultation - no rales, rhonchi or wheezes  CV: regular rate and rhythm, normal S1 S2, no S3 or S4, no murmur, click or rub, no peripheral edema and peripheral pulses strong  ABDOMEN: soft, nontender, no hepatosplenomegaly, no masses and bowel sounds normal  MS: no gross musculoskeletal defects noted, no edema    Diagnostic Test Results:  Labs reviewed in Epic        Assessment & Plan       ICD-10-CM    1. Elevated blood sugar R73.9 Hemoglobin A1c     CANCELED: Hemoglobin A1c   2. Chronic pain syndrome G89.4 oxyCODONE (ROXICODONE) 5 MG tablet   3. Gastroesophageal reflux disease, esophagitis presence not specified K21.9 omeprazole (PRILOSEC) 40 MG DR capsule   4. Essential hypertension with goal blood pressure less than 140/90 I10  furosemide (LASIX) 20 MG tablet     amLODIPine-benazepril (LOTREL) 5-20 MG capsule   5. Hyperlipidemia LDL goal <130 E78.5 rosuvastatin (CRESTOR) 20 MG tablet       History of elevated blood sugar and check an A1c  Well-controlled chronic pain, continue current regimen without change  Continue PPI for reflux disease.  Potentially chronic kidney disease was reviewed  Hypertension controlled and refills provided.,  Same for hyperlipidemia  Can see her back when she returns from michelle Berumen MD  [facility]

## 2019-09-27 ENCOUNTER — ALLIED HEALTH/NURSE VISIT (OUTPATIENT)
Dept: FAMILY MEDICINE | Facility: CLINIC | Age: 79
End: 2019-09-27
Payer: COMMERCIAL

## 2019-09-27 DIAGNOSIS — R73.9 ELEVATED BLOOD SUGAR: ICD-10-CM

## 2019-09-27 DIAGNOSIS — Z23 NEED FOR INFLUENZA VACCINATION: Primary | ICD-10-CM

## 2019-09-27 LAB — HBA1C MFR BLD: 10.3 % (ref 0–5.6)

## 2019-09-27 PROCEDURE — G0008 ADMIN INFLUENZA VIRUS VAC: HCPCS

## 2019-09-27 PROCEDURE — 99207 ZZC NO CHARGE NURSE ONLY: CPT

## 2019-09-27 PROCEDURE — 36415 COLL VENOUS BLD VENIPUNCTURE: CPT | Performed by: FAMILY MEDICINE

## 2019-09-27 PROCEDURE — 83036 HEMOGLOBIN GLYCOSYLATED A1C: CPT | Mod: QW | Performed by: FAMILY MEDICINE

## 2019-09-27 PROCEDURE — 90662 IIV NO PRSV INCREASED AG IM: CPT

## 2019-10-02 ENCOUNTER — OFFICE VISIT (OUTPATIENT)
Dept: FAMILY MEDICINE | Facility: CLINIC | Age: 79
End: 2019-10-02
Payer: COMMERCIAL

## 2019-10-02 VITALS
BODY MASS INDEX: 31.55 KG/M2 | RESPIRATION RATE: 18 BRPM | SYSTOLIC BLOOD PRESSURE: 116 MMHG | TEMPERATURE: 96.4 F | DIASTOLIC BLOOD PRESSURE: 72 MMHG | OXYGEN SATURATION: 98 % | WEIGHT: 167 LBS | HEART RATE: 64 BPM

## 2019-10-02 DIAGNOSIS — E11.9 TYPE 2 DIABETES MELLITUS WITHOUT COMPLICATION, WITH LONG-TERM CURRENT USE OF INSULIN (H): Primary | ICD-10-CM

## 2019-10-02 DIAGNOSIS — Z79.4 TYPE 2 DIABETES MELLITUS WITHOUT COMPLICATION, WITH LONG-TERM CURRENT USE OF INSULIN (H): Primary | ICD-10-CM

## 2019-10-02 PROCEDURE — 99214 OFFICE O/P EST MOD 30 MIN: CPT | Performed by: FAMILY MEDICINE

## 2019-10-02 RX ORDER — ASPIRIN 81 MG/1
81 TABLET, CHEWABLE ORAL DAILY
Qty: 90 TABLET | Refills: 3 | Status: SHIPPED | OUTPATIENT
Start: 2019-10-02 | End: 2021-03-02

## 2019-10-02 RX ORDER — METFORMIN HCL 500 MG
500 TABLET, EXTENDED RELEASE 24 HR ORAL
Qty: 120 TABLET | Refills: 1 | Status: SHIPPED | OUTPATIENT
Start: 2019-10-02 | End: 2019-11-11

## 2019-10-02 ASSESSMENT — PAIN SCALES - GENERAL: PAINLEVEL: NO PAIN (0)

## 2019-10-02 NOTE — PROGRESS NOTES
Subjective     Diabetes Follow-up    BP Readings from Last 2 Encounters:   10/02/19 116/72   09/25/19 122/80     Hemoglobin A1C (%)   Date Value   09/27/2019 10.3 (H)     LDL Cholesterol Calculated (mg/dL)   Date Value   09/25/2019     Cannot estimate LDL when triglyceride exceeds 400 mg/dL   05/30/2018 28     LDL Cholesterol Direct (mg/dL)   Date Value   09/25/2019 58       Diabetes Management Resources        Ingrid is a 79 year old female who comes to clinic returns clinic to discuss her new diagnosis of type 2 diabetes.  This is quite a surprise to her.  We discussed usual lifestyle modification, medications, therapies associated and also long-term potential complications if untreated.  Right now she is concerned with how she can replace her cherished sweet tea.    Review of Systems   ROS COMP: Constitutional, HEENT, cardiovascular, pulmonary, gi and gu systems are negative, except as otherwise noted.      Objective    /72   Pulse 64   Temp 96.4  F (35.8  C) (Temporal)   Resp 18   Wt 75.8 kg (167 lb)   SpO2 98%   BMI 31.55 kg/m       Wt Readings from Last 2 Encounters:   10/02/19 75.8 kg (167 lb)   09/25/19 73.5 kg (162 lb)       Physical Exam   GENERAL: healthy, alert and no distress  NECK: no adenopathy, no asymmetry, masses, or scars and thyroid normal to palpation  RESP: lungs clear to auscultation - no rales, rhonchi or wheezes  CV: regular rate and rhythm, normal S1 S2, no S3 or S4, no murmur, click or rub, no peripheral edema and peripheral pulses strong  ABDOMEN: soft, nontender, no hepatosplenomegaly, no masses and bowel sounds normal  MS: no gross musculoskeletal defects noted, no edema    Diagnostic Test Results:  Labs reviewed in Epic        Assessment & Plan       ICD-10-CM    1. Type 2 diabetes mellitus without complication, with long-term current use of insulin (H) E11.9 metFORMIN (GLUCOPHAGE-XR) 500 MG 24 hr tablet    Z79.4 aspirin (ASA) 81 MG chewable tablet       Start with  metformin 500 mg daily, and increase by 1 tab weekly until at a maximum dose of 2000 mg daily.  Start aspirin 81 mg and she is Marquis on a statin.  I will see her back in a month.  Also set up to see diabetic education in the meantime.  To hopefully iron out her diet issues.  Thus far, no concern for complications.    Alex Berumen MD  [facility]

## 2019-10-21 DIAGNOSIS — G89.4 CHRONIC PAIN SYNDROME: ICD-10-CM

## 2019-10-21 NOTE — TELEPHONE ENCOUNTER
Roxicodone  Last Written Prescription Date:  09/25/2019  Last Fill Quantity: 112,  # refills: 0   Last office visit: 10/2/2019 with prescribing provider:  Ata   Future Office Visit:   Next 5 appointments (look out 90 days)    Oct 23, 2019 11:40 AM CDT  SHORT with Alex Berumen MD  [facility] ([facility]) [address] [place] [id number]  [phone]         Routing refill request to provider for review/approval because:  Drug not on the [facility] refill protocol     Fadia Pak RN

## 2019-10-23 ENCOUNTER — OFFICE VISIT (OUTPATIENT)
Dept: FAMILY MEDICINE | Facility: CLINIC | Age: 79
End: 2019-10-23
Payer: COMMERCIAL

## 2019-10-23 VITALS
OXYGEN SATURATION: 93 % | RESPIRATION RATE: 18 BRPM | HEART RATE: 78 BPM | DIASTOLIC BLOOD PRESSURE: 72 MMHG | WEIGHT: 160 LBS | BODY MASS INDEX: 30.23 KG/M2 | SYSTOLIC BLOOD PRESSURE: 122 MMHG | TEMPERATURE: 96.6 F

## 2019-10-23 DIAGNOSIS — K21.9 GASTROESOPHAGEAL REFLUX DISEASE WITHOUT ESOPHAGITIS: ICD-10-CM

## 2019-10-23 DIAGNOSIS — Z98.890 HISTORY OF ESOPHAGECTOMY: ICD-10-CM

## 2019-10-23 DIAGNOSIS — G89.4 CHRONIC PAIN SYNDROME: ICD-10-CM

## 2019-10-23 DIAGNOSIS — E11.9 TYPE 2 DIABETES MELLITUS WITHOUT COMPLICATION, WITH LONG-TERM CURRENT USE OF INSULIN (H): ICD-10-CM

## 2019-10-23 DIAGNOSIS — Z90.49 HISTORY OF ESOPHAGECTOMY: ICD-10-CM

## 2019-10-23 DIAGNOSIS — Z79.4 TYPE 2 DIABETES MELLITUS WITHOUT COMPLICATION, WITH LONG-TERM CURRENT USE OF INSULIN (H): ICD-10-CM

## 2019-10-23 DIAGNOSIS — I10 ESSENTIAL HYPERTENSION WITH GOAL BLOOD PRESSURE LESS THAN 140/90: Primary | ICD-10-CM

## 2019-10-23 PROCEDURE — 99214 OFFICE O/P EST MOD 30 MIN: CPT | Performed by: FAMILY MEDICINE

## 2019-10-23 RX ORDER — OXYCODONE HYDROCHLORIDE 5 MG/1
TABLET ORAL
Qty: 112 TABLET | Refills: 0 | Status: SHIPPED | OUTPATIENT
Start: 2019-10-23 | End: 2020-05-26

## 2019-10-23 ASSESSMENT — PAIN SCALES - GENERAL: PAINLEVEL: NO PAIN (0)

## 2019-10-23 NOTE — PROGRESS NOTES
Subjective     Diabetes Follow-up      How often are you checking your blood sugar? Not at all    What time of day are you checking your blood sugars (select all that apply)?      Have you had any blood sugars above 200?      Have you had any blood sugars below 70?      What symptoms do you notice when your blood sugar is low?  None    What concerns do you have today about your diabetes? None     Do you have any of these symptoms? (Select all that apply)  Numbness in feet     Have you had a diabetic eye exam in the last 12 months? No    BP Readings from Last 2 Encounters:   10/23/19 122/72   10/02/19 116/72     Hemoglobin A1C (%)   Date Value   09/27/2019 10.3 (H)     LDL Cholesterol Calculated (mg/dL)   Date Value   09/25/2019     Cannot estimate LDL when triglyceride exceeds 400 mg/dL   05/30/2018 28     LDL Cholesterol Direct (mg/dL)   Date Value   09/25/2019 58       Diabetes Management Resources      Ingrid is a 79 year old female who comes to clinic in regards to her diabetes.  She was recently diagnosed with type 2 diabetes and I placed her on metformin.  She has greatly increased to 2000 mg daily and tolerating well.  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  She tells me that after her surgery the [facility] put her on 40 mg of omeprazole twice daily, which is well above normal dosing limits.  I am hesitant to restart that at this time.  I would rather have her seen by GI in her particular case first for more long-term recommendations, knowing there has been kidney issues with PPIs, etc.  She has had unremarkable EGDs in the last couple of years.  And a normal swallow study.      She has been taking oxycodone 5 mg 3-4 times a day for control of her chronic chest pain that is attributed to her esophagectomy.  Pain is been controlled with this.  There is been no recent escalation diversion, early refills etc.  She has been reliable.    [place]  I want her to establish with primary care to continue working on her diabetes.  Her A1c a few weeks ago was 10.  She meets a diabetic education this week and then travel south.  Hopefully over the winter she will establish care and get her diabetes under better control.    Review of Systems   ROS COMP: Constitutional, HEENT, cardiovascular, pulmonary, gi and gu systems are negative, except as otherwise noted.      Objective    /72   Pulse 78   Temp 96.6  F (35.9  C) (Temporal)   Resp 18   Wt 72.6 kg (160 lb)   SpO2 93%   BMI 30.23 kg/m       Wt Readings from Last 2 Encounters:   10/23/19 72.6 kg (160 lb)   10/02/19 75.8 kg (167 lb)       Physical Exam   GENERAL: healthy, alert and no distress  NECK: no adenopathy, no asymmetry, masses, or scars and thyroid normal to palpation  RESP: lungs clear to auscultation - no rales, rhonchi or wheezes  CV: regular rate and rhythm, normal S1 S2, no S3 or S4, no murmur, click or rub, no peripheral edema and peripheral pulses strong  ABDOMEN: soft, nontender, no hepatosplenomegaly, no masses and bowel sounds normal  MS: no gross musculoskeletal defects noted, no edema    Diagnostic Test Results:  Labs reviewed in Epic        Assessment & Plan       ICD-10-CM    1. Essential hypertension with goal blood pressure less than 140/90 I10    2. Type 2 diabetes mellitus without complication, with long-term current use of insulin (H) E11.9     Z79.4    3. Chronic pain syndrome G89.4    4. History of esophagectomy Z98.890     Z90.49    5. Gastroesophageal reflux disease without esophagitis K21.9        Controlled above issues.  No medication change deep today.  Refills provided.  Recheck with her next PCP within the month to recheck her A1c and consider addition of more medication.  She will continue to work on diet modification.  Meet with diabetic education this week for further education.  I can see her back when she returns    Alex Berumen  MD  [facility]

## 2019-10-28 ENCOUNTER — ALLIED HEALTH/NURSE VISIT (OUTPATIENT)
Dept: EDUCATION SERVICES | Facility: CLINIC | Age: 79
End: 2019-10-28
Payer: COMMERCIAL

## 2019-10-28 DIAGNOSIS — E11.9 TYPE 2 DIABETES MELLITUS WITHOUT COMPLICATION, WITH LONG-TERM CURRENT USE OF INSULIN (H): Primary | ICD-10-CM

## 2019-10-28 DIAGNOSIS — Z79.4 TYPE 2 DIABETES MELLITUS WITHOUT COMPLICATION, WITH LONG-TERM CURRENT USE OF INSULIN (H): Primary | ICD-10-CM

## 2019-10-28 PROCEDURE — G0108 DIAB MANAGE TRN  PER INDIV: HCPCS

## 2019-10-28 NOTE — PROGRESS NOTES
"Diabetes Self-Management Education & Support    Diabetes Education Self Management & Training    SUBJECTIVE/OBJECTIVE:  Presents for: Initial Assessment for new diagnosis  Accompanied by: Self  Diabetes education in the past 24mo: No  Focus of Visit: Monitoring, Healthy Eating  Diabetes type: Type 2  Date of diagnosis: new  Disease course: Improving  Diabetes management related comments/concerns: what to eat; leaving [place] go [place] for [place]  Cultural Influences/Ethnic Background:  American      Diabetes Symptoms & Complications  Foot ulcerations: Yes  Weight loss: Yes  Symptom course: Improving  Weight trend: Decreasing steadily  Peripheral neuropathy: Yes    Patient Problem List and Family Medical History reviewed for relevant medical history, current medical status, and diabetes risk factors.    Vitals:  There were no vitals taken for this visit.  Estimated body mass index is 30.23 kg/m  as calculated from the following:    Height as of 5/2/19: 1.549 m (5' 1\").    Weight as of 10/23/19: 72.6 kg (160 lb).   Last 3 BP:   BP Readings from Last 3 Encounters:   10/23/19 122/72   10/02/19 116/72   09/25/19 122/80       History   Smoking Status     Never Smoker   Smokeless Tobacco     Never Used       Labs:  Lab Results   Component Value Date    A1C 10.3 09/27/2019     Lab Results   Component Value Date     09/25/2019     Lab Results   Component Value Date    LDL  09/25/2019     Cannot estimate LDL when triglyceride exceeds 400 mg/dL    LDL 58 09/25/2019     HDL Cholesterol   Date Value Ref Range Status   09/25/2019 52 >49 mg/dL Final   ]  GFR Estimate   Date Value Ref Range Status   09/25/2019 74 >60 mL/min/[1.73_m2] Final     Comment:     Non  GFR Calc  Starting 12/18/2018, serum creatinine based estimated GFR (eGFR) will be   calculated using the Chronic Kidney Disease Epidemiology Collaboration   (CKD-EPI) equation.       GFR Estimate If Black   Date Value Ref Range Status   09/25/2019 " 85 >60 mL/min/[1.73_m2] Final     Comment:      GFR Calc  Starting 12/18/2018, serum creatinine based estimated GFR (eGFR) will be   calculated using the Chronic Kidney Disease Epidemiology Collaboration   (CKD-EPI) equation.       Lab Results   Component Value Date    CR 0.76 09/25/2019     No results found for: MICROALBUMIN    Healthy Eating  Healthy Eating Assessed Today: Yes  Meal planning: Avoiding sweets  Meals include: Lunch, Dinner, Snacks  Beverages: Water, Tea, Coffee  Has patient met with a dietitian in the past?: No    Being Active  Being Active Assessed Today: Yes  Exercise:: Currently not exercising    Monitoring  Monitoring Assessed Today: No      Taking Medications  Diabetes Medication(s)     Biguanides       metFORMIN (GLUCOPHAGE-XR) 500 MG 24 hr tablet    Take 1 tablet (500 mg) by mouth daily (with dinner) Increase by 1 tab every week until at 4 tabs daily with dinner     Patient taking differently:  Take 500 mg by mouth daily (with dinner) Taking 3 tabs daily          Taking Medication Assessed Today: Yes  Current Treatments: Oral Agent (monotherapy)  Problems taking diabetes medications regularly?: Yes  Diabetes medication side effects?: No  Treatment Compliance: All of the time    Problem Solving  Problem Solving Assessed Today: No      Reducing Risks  Reducing Risks Assessed Today: No    Healthy Coping  Healthy Coping Assessed Today: Yes  Emotional response to diabetes: Ready to learn, Acceptance  Informal Support system:: Children, Family, Spouse  Stage of change: PREPARATION (Decided to change - considering how)  Support resources: Websites  Patient Activation Measure Survey Score:  No flowsheet data found.    ASSESSMENT:  Patient newly diagnosed. Taking metformin even though difficult with her esophagectomy. She has switched sweat tea to diet tea and Werthers candy to sugar-free candy. Has cut back on bread, rice and pasta. Has to chew her food very well with not having  esophagus.     Patient's most recent   Lab Results   Component Value Date    A1C 10.3 09/27/2019    is not meeting goal of <8.0    INTERVENTION:   Diabetes knowledge and skills assessment:     Patient is knowledgeable in diabetes management concepts related to: Healthy Eating    Patient needs further education on the following diabetes management concepts: Healthy Eating, Being Active, Monitoring, Taking Medication, Problem Solving, Reducing Risks and Healthy Coping    Based on learning assessment above, most appropriate setting for further diabetes education would be: Group class or Individual setting.    Education provided today on:  AADE Self-Care Behaviors:  Diabetes Pathophysiology  Healthy Eating: carbohydrate counting, portion control and plate planning method  Being Active: relationship to blood glucose  Monitoring: purpose, proper technique, log and interpret results, individual blood glucose targets and frequency of monitoring  Taking Medication: action of prescribed medication and asking pharmacist about a liquid form of metformin  Reducing Risks: A1C - goals, relating to blood glucose levels, how often to check  Healthy Coping: benefits of making appropriate lifestyle changes  Patient was instructed on One Touch Verio meter and was able to provide an accurate return demonstration. Patient's blood glucose reading today was 170 mg/dL.    Opportunities for ongoing education and support in diabetes-self management were discussed.    Pt verbalized understanding of concepts discussed and recommendations provided today.       Education Materials Provided:  [facility] Understanding Diabetes Booklet, My Plate Planner, One Touch Verio meter kit and logbook and A1c handout    PLAN:  [place]. Blood sugars appear to be improved from her A1c as indicated by her reduction in carb intake, improvement in neuropathy symptoms on bottom of feet and her FBG today 170.   Recommend she  check BG once daily, [place], we can follow up as needed next year when she returns.   .    Latanya Herrera RDN, LD, CDE    Time Spent: 75 minutes  Encounter Type: Individual    Any diabetes medication dose changes were made via the CDE Protocol and Collaborative Practice Agreement with the patient's primary care provider. A copy of this encounter was shared with the provider.

## 2019-11-06 ENCOUNTER — HEALTH MAINTENANCE LETTER (OUTPATIENT)
Age: 79
End: 2019-11-06

## 2019-11-22 ENCOUNTER — TELEPHONE (OUTPATIENT)
Dept: FAMILY MEDICINE | Facility: CLINIC | Age: 79
End: 2019-11-22

## 2019-11-22 NOTE — TELEPHONE ENCOUNTER
Reason for Call:  Other     Detailed comments: Ingrid states Metformin is making her belly very upset and causing pain.  She states she cannot take this medication any longer and is requesting something different.    [facility] mailservice pharmacy    Phone Number Patient can be reached at: Home number on file [phone] (home)    Best Time: any    Can we leave a detailed message on this number? YES    Call taken on 11/22/2019 at 11:38 AM by Tatiana Leon

## 2019-11-22 NOTE — TELEPHONE ENCOUNTER
Per Dr. Berumen [place]. He can not manage a new medication from up here.  Day Bo MA

## 2020-02-16 ENCOUNTER — HEALTH MAINTENANCE LETTER (OUTPATIENT)
Age: 80
End: 2020-02-16

## 2020-05-19 ENCOUNTER — TRANSFERRED RECORDS (OUTPATIENT)
Dept: HEALTH INFORMATION MANAGEMENT | Facility: CLINIC | Age: 80
End: 2020-05-19

## 2020-05-20 ENCOUNTER — TELEPHONE (OUTPATIENT)
Dept: FAMILY MEDICINE | Facility: CLINIC | Age: 80
End: 2020-05-20

## 2020-05-26 ENCOUNTER — VIRTUAL VISIT (OUTPATIENT)
Dept: FAMILY MEDICINE | Facility: CLINIC | Age: 80
End: 2020-05-26
Payer: COMMERCIAL

## 2020-05-26 DIAGNOSIS — G89.4 CHRONIC PAIN SYNDROME: ICD-10-CM

## 2020-05-26 DIAGNOSIS — Z79.4 TYPE 2 DIABETES MELLITUS WITHOUT COMPLICATION, WITH LONG-TERM CURRENT USE OF INSULIN (H): ICD-10-CM

## 2020-05-26 DIAGNOSIS — E11.9 TYPE 2 DIABETES MELLITUS WITHOUT COMPLICATION, WITH LONG-TERM CURRENT USE OF INSULIN (H): ICD-10-CM

## 2020-05-26 LAB
CREAT UR-MCNC: 124 MG/DL
MICROALBUMIN UR-MCNC: 28 MG/L
MICROALBUMIN/CREAT UR: 22.34 MG/G CR (ref 0–25)

## 2020-05-26 PROCEDURE — 82043 UR ALBUMIN QUANTITATIVE: CPT | Performed by: FAMILY MEDICINE

## 2020-05-26 PROCEDURE — 99000 SPECIMEN HANDLING OFFICE-LAB: CPT | Performed by: FAMILY MEDICINE

## 2020-05-26 PROCEDURE — 99213 OFFICE O/P EST LOW 20 MIN: CPT | Mod: 95 | Performed by: FAMILY MEDICINE

## 2020-05-26 PROCEDURE — 80307 DRUG TEST PRSMV CHEM ANLYZR: CPT | Mod: 90 | Performed by: FAMILY MEDICINE

## 2020-05-26 RX ORDER — BLOOD SUGAR DIAGNOSTIC
STRIP MISCELLANEOUS
Qty: 50 STRIP | Refills: 6 | Status: SHIPPED | OUTPATIENT
Start: 2020-05-26 | End: 2024-06-26

## 2020-05-26 RX ORDER — OXYCODONE HYDROCHLORIDE 5 MG/1
TABLET ORAL
Qty: 112 TABLET | Refills: 0 | Status: SHIPPED | OUTPATIENT
Start: 2020-05-26 | End: 2020-06-25

## 2020-05-26 ASSESSMENT — PAIN SCALES - GENERAL: PAINLEVEL: SEVERE PAIN (6)

## 2020-05-26 NOTE — PROGRESS NOTES
"Ingrid Garcia is a 80 year old female who is being evaluated via a billable telephone visit.      The patient has been notified of following:     \"This telephone visit will be conducted via a call between you and your physician/provider. We have found that certain health care needs can be provided without the need for a physical exam.  This service lets us provide the care you need with a short phone conversation.  If a prescription is necessary we can send it directly to your pharmacy.  If lab work is needed we can place an order for that and you can then stop by our lab to have the test done at a later time.    If during the course of the call the physician/provider feels a telephone visit is not appropriate, you will not be charged for this service.\"       Ingrid Garcia complains of    Chief Complaint   Patient presents with     Recheck Medication       I have reviewed and updated the patient's Past Medical History, Social History, Family History and Medication List.    ALLERGIES  Morphine sulfate    Day Bo (MA signature)    Additional provider notes: Recheck Abd pain   Feels well  Some withdrawal symptoms, low on oxy  Pain controlled, central chest releated to esophagectomy  Back for summer  [place]  Living with grand daughter      Assessment/Plan:    ICD-10-CM    1. Chronic pain syndrome  G89.4 oxyCODONE (ROXICODONE) 5 MG tablet     Drug  Screen Comprehensive , Urine with Reported Meds (MedTox) (Pain Care Package)   2. Type 2 diabetes mellitus without complication, with long-term current use of insulin (H)  E11.9 blood glucose (ONETOUCH VERIO IQ) test strip    Z79.4 **A1C FUTURE anytime     **Basic metabolic panel FUTURE anytime     Lipid panel reflex to direct LDL Fasting     Albumin Random Urine Quantitative with Creat Ratio       Recheck a1c  Stable chronic pain, due for labs, further recs to follow  rtc 4 m    I have reviewed the note as documented above.  This " accurately captures the substance of my conversation with the patient.    Phone call contact time 22m    Charles TAMEZ

## 2020-05-28 DIAGNOSIS — Z79.4 TYPE 2 DIABETES MELLITUS WITHOUT COMPLICATION, WITH LONG-TERM CURRENT USE OF INSULIN (H): ICD-10-CM

## 2020-05-28 DIAGNOSIS — E11.9 TYPE 2 DIABETES MELLITUS WITHOUT COMPLICATION, WITH LONG-TERM CURRENT USE OF INSULIN (H): ICD-10-CM

## 2020-05-28 LAB
ANION GAP SERPL CALCULATED.3IONS-SCNC: 5 MMOL/L (ref 3–14)
BUN SERPL-MCNC: 18 MG/DL (ref 7–30)
CALCIUM SERPL-MCNC: 8.4 MG/DL (ref 8.5–10.1)
CHLORIDE SERPL-SCNC: 108 MMOL/L (ref 94–109)
CHOLEST SERPL-MCNC: 122 MG/DL
CO2 SERPL-SCNC: 31 MMOL/L (ref 20–32)
CREAT SERPL-MCNC: 0.76 MG/DL (ref 0.52–1.04)
GFR SERPL CREATININE-BSD FRML MDRD: 75 ML/MIN/{1.73_M2}
GLUCOSE SERPL-MCNC: 109 MG/DL (ref 70–99)
HBA1C MFR BLD: 5.9 % (ref 0–5.6)
HDLC SERPL-MCNC: 40 MG/DL
LDLC SERPL CALC-MCNC: 10 MG/DL
NONHDLC SERPL-MCNC: 82 MG/DL
POTASSIUM SERPL-SCNC: 4.1 MMOL/L (ref 3.4–5.3)
SODIUM SERPL-SCNC: 144 MMOL/L (ref 133–144)
TRIGL SERPL-MCNC: 359 MG/DL

## 2020-05-28 PROCEDURE — 36415 COLL VENOUS BLD VENIPUNCTURE: CPT | Performed by: FAMILY MEDICINE

## 2020-05-28 PROCEDURE — 80048 BASIC METABOLIC PNL TOTAL CA: CPT | Performed by: FAMILY MEDICINE

## 2020-05-28 PROCEDURE — 83036 HEMOGLOBIN GLYCOSYLATED A1C: CPT | Mod: QW | Performed by: FAMILY MEDICINE

## 2020-05-28 PROCEDURE — 80061 LIPID PANEL: CPT | Performed by: FAMILY MEDICINE

## 2020-05-30 LAB — PAIN DRUG SCR UR W RPTD MEDS: NORMAL

## 2020-06-22 ENCOUNTER — TELEPHONE (OUTPATIENT)
Dept: FAMILY MEDICINE | Facility: CLINIC | Age: 80
End: 2020-06-22

## 2020-06-22 DIAGNOSIS — G89.4 CHRONIC PAIN SYNDROME: ICD-10-CM

## 2020-06-22 NOTE — TELEPHONE ENCOUNTER
Requested Prescriptions   Pending Prescriptions Disp Refills     oxyCODONE (ROXICODONE) 5 MG tablet [Pharmacy Med Name: OXYCODONE HCL 5MG TABS] 112 tablet 0     Sig: TAKE ONE TABLET BY MOUTH 3-4 TIMES DAILY AS NEEDED FOR ABDOMINAL PAIN (30 DAY SUPPLY)     Last Written Prescription Date:  05/26/2020  Last Fill Quantity: 112,   # refills: 0  Last Office Visit: 05/26/2020  Future Office visit:    Next 5 appointments (look out 90 days)    Aug 26, 2020 11:00 AM CDT  [facility] [address] [place] [id number] [phone]           Routing refill request to provider for review/approval because:  Drug not on the [facility], UMP or [facility] refill protocol or controlled substance    Tina Joya MA

## 2020-06-25 RX ORDER — OXYCODONE HYDROCHLORIDE 5 MG/1
TABLET ORAL
Qty: 112 TABLET | Refills: 0 | Status: SHIPPED | OUTPATIENT
Start: 2020-06-25 | End: 2020-07-23

## 2020-07-15 ENCOUNTER — TELEPHONE (OUTPATIENT)
Dept: FAMILY MEDICINE | Facility: CLINIC | Age: 80
End: 2020-07-15

## 2020-07-15 DIAGNOSIS — E11.9 TYPE 2 DIABETES MELLITUS WITHOUT COMPLICATION, WITH LONG-TERM CURRENT USE OF INSULIN (H): ICD-10-CM

## 2020-07-15 DIAGNOSIS — I10 ESSENTIAL HYPERTENSION WITH GOAL BLOOD PRESSURE LESS THAN 140/90: ICD-10-CM

## 2020-07-15 DIAGNOSIS — Z79.4 TYPE 2 DIABETES MELLITUS WITHOUT COMPLICATION, WITH LONG-TERM CURRENT USE OF INSULIN (H): ICD-10-CM

## 2020-07-15 RX ORDER — METOPROLOL TARTRATE 25 MG/1
TABLET, FILM COATED ORAL
Qty: 180 TABLET | Refills: 0 | Status: SHIPPED | OUTPATIENT
Start: 2020-07-15 | End: 2020-08-26

## 2020-07-15 NOTE — TELEPHONE ENCOUNTER
Reason for Call:  Medication or medication refill:    Do you use a [facility]?  Name of the pharmacy and phone number for the current request:  [facility]    Name of the medication requested: metformin hydrochloride extended release     Other request: pt received letter recall information pt needs a different script called in for a replacement     Can we leave a detailed message on this number? YES    Phone number patient can be reached at: Home number on file [phone] (home)    Best Time: anytime     Call taken on 7/15/2020 at 4:59 PM by Twyla Salinas

## 2020-07-16 RX ORDER — METFORMIN HCL 500 MG
2000 TABLET, EXTENDED RELEASE 24 HR ORAL
Qty: 360 TABLET | Refills: 3 | Status: SHIPPED | OUTPATIENT
Start: 2020-07-16 | End: 2020-08-26

## 2020-07-16 NOTE — TELEPHONE ENCOUNTER
Unable to get in touch via phone call. Patient does not seem to read [employer] messages either.   Sara Pearson, CMA

## 2020-07-17 NOTE — TELEPHONE ENCOUNTER
Pt is only on Metformin not sure what she is talking about hydrochlorothiazide. She will need to call her pharmacy to see if she the ones that were recalled. The pharmacy should be replacing them if she does. It was only concern manufactures that were recalled.   Day Bo MA

## 2020-07-17 NOTE — TELEPHONE ENCOUNTER
Spoke with patient and informed of message below. Patient understood and would contact pharmacy.     Tina Joya MA

## 2020-07-20 DIAGNOSIS — G89.4 CHRONIC PAIN SYNDROME: ICD-10-CM

## 2020-07-20 NOTE — TELEPHONE ENCOUNTER
oxyCODONE (ROXICODONE) 5 MG tablet    Last Written Prescription Date:  06/25/2020  Last Fill Quantity: 112,   # refills: 0  Last Office Visit: 10/23/2019  Future Office visit:    Next 5 appointments (look out 90 days)    Aug 26, 2020 11:00 AM CDT  [facility] [address] [place] [id number] [phone]           Routing refill request to provider for review/approval because:  Drug not on the [facility], [facility] or [facility] refill protocol or controlled substance

## 2020-07-22 NOTE — TELEPHONE ENCOUNTER
Patient advise on status of rx. Still pending MD signature. Please advise asap.  Kerrie Sweeney CMA

## 2020-07-23 RX ORDER — OXYCODONE HYDROCHLORIDE 5 MG/1
TABLET ORAL
Qty: 112 TABLET | Refills: 0 | Status: SHIPPED | OUTPATIENT
Start: 2020-07-23 | End: 2020-08-26

## 2020-08-26 ENCOUNTER — OFFICE VISIT (OUTPATIENT)
Dept: FAMILY MEDICINE | Facility: CLINIC | Age: 80
End: 2020-08-26
Payer: COMMERCIAL

## 2020-08-26 VITALS
TEMPERATURE: 96.8 F | OXYGEN SATURATION: 93 % | DIASTOLIC BLOOD PRESSURE: 66 MMHG | HEART RATE: 72 BPM | SYSTOLIC BLOOD PRESSURE: 124 MMHG | RESPIRATION RATE: 16 BRPM | WEIGHT: 150 LBS | BODY MASS INDEX: 28.32 KG/M2 | HEIGHT: 61 IN

## 2020-08-26 DIAGNOSIS — Z79.4 TYPE 2 DIABETES MELLITUS WITHOUT COMPLICATION, WITH LONG-TERM CURRENT USE OF INSULIN (H): ICD-10-CM

## 2020-08-26 DIAGNOSIS — G89.4 CHRONIC PAIN SYNDROME: ICD-10-CM

## 2020-08-26 DIAGNOSIS — E78.5 HYPERLIPIDEMIA LDL GOAL <130: ICD-10-CM

## 2020-08-26 DIAGNOSIS — K21.9 GASTROESOPHAGEAL REFLUX DISEASE, ESOPHAGITIS PRESENCE NOT SPECIFIED: ICD-10-CM

## 2020-08-26 DIAGNOSIS — I10 ESSENTIAL HYPERTENSION WITH GOAL BLOOD PRESSURE LESS THAN 140/90: ICD-10-CM

## 2020-08-26 DIAGNOSIS — E11.9 TYPE 2 DIABETES MELLITUS WITHOUT COMPLICATION, WITH LONG-TERM CURRENT USE OF INSULIN (H): ICD-10-CM

## 2020-08-26 DIAGNOSIS — R35.0 URINE FREQUENCY: Primary | ICD-10-CM

## 2020-08-26 LAB — HBA1C MFR BLD: 5.9 % (ref 0–5.6)

## 2020-08-26 PROCEDURE — 83036 HEMOGLOBIN GLYCOSYLATED A1C: CPT | Performed by: FAMILY MEDICINE

## 2020-08-26 PROCEDURE — 36415 COLL VENOUS BLD VENIPUNCTURE: CPT | Performed by: FAMILY MEDICINE

## 2020-08-26 PROCEDURE — 99214 OFFICE O/P EST MOD 30 MIN: CPT | Performed by: FAMILY MEDICINE

## 2020-08-26 RX ORDER — OXYCODONE HYDROCHLORIDE 5 MG/1
TABLET ORAL
Qty: 112 TABLET | Refills: 0 | Status: SHIPPED | OUTPATIENT
Start: 2020-08-26 | End: 2020-09-22

## 2020-08-26 RX ORDER — METOPROLOL TARTRATE 25 MG/1
25 TABLET, FILM COATED ORAL 2 TIMES DAILY
Qty: 180 TABLET | Refills: 3 | Status: SHIPPED | OUTPATIENT
Start: 2020-08-26 | End: 2021-09-08

## 2020-08-26 RX ORDER — ROSUVASTATIN CALCIUM 20 MG/1
20 TABLET, COATED ORAL DAILY
Qty: 90 TABLET | Refills: 3 | Status: SHIPPED | OUTPATIENT
Start: 2020-08-26 | End: 2021-10-12

## 2020-08-26 RX ORDER — OXYBUTYNIN CHLORIDE 5 MG/1
5 TABLET ORAL DAILY
Qty: 60 TABLET | Refills: 1 | Status: SHIPPED | OUTPATIENT
Start: 2020-08-26 | End: 2021-03-02

## 2020-08-26 RX ORDER — METFORMIN HCL 500 MG
2000 TABLET, EXTENDED RELEASE 24 HR ORAL
Qty: 360 TABLET | Refills: 3 | Status: SHIPPED | OUTPATIENT
Start: 2020-08-26 | End: 2021-09-08

## 2020-08-26 RX ORDER — AMLODIPINE AND BENAZEPRIL HYDROCHLORIDE 5; 20 MG/1; MG/1
1 CAPSULE ORAL DAILY
Qty: 90 CAPSULE | Refills: 3 | Status: SHIPPED | OUTPATIENT
Start: 2020-08-26 | End: 2021-09-08

## 2020-08-26 RX ORDER — OMEPRAZOLE 40 MG/1
CAPSULE, DELAYED RELEASE ORAL
Qty: 90 CAPSULE | Refills: 3 | Status: SHIPPED | OUTPATIENT
Start: 2020-08-26 | End: 2021-09-08

## 2020-08-26 RX ORDER — FUROSEMIDE 20 MG
20 TABLET ORAL DAILY
Qty: 90 TABLET | Refills: 3 | Status: SHIPPED | OUTPATIENT
Start: 2020-08-26 | End: 2021-09-08

## 2020-08-26 ASSESSMENT — PAIN SCALES - GENERAL: PAINLEVEL: NO PAIN (0)

## 2020-08-26 ASSESSMENT — MIFFLIN-ST. JEOR: SCORE: 1087.78

## 2020-08-26 NOTE — PROGRESS NOTES
Subjective     Ingrid Garcia is a 80 year old female who presents to clinic today for the following health issues:    HPI     Returns for routine checkup.  Feels her diabetes is going well.  She occasionally checks a fasting blood sugar and they have been in the 120s.  She has been reducing her carbohydrates.  Her pain is controlled on the current regimen of oxycodone for her chronic chest pain after esophagectomy.  Complains of symptoms of both urge and stress incontinence.  Diabetes Follow-up    How often are you checking your blood sugar? One time daily  What time of day are you checking your blood sugars (select all that apply)?  Before meals  Have you had any blood sugars above 200?  No  Have you had any blood sugars below 70?  Yes     What symptoms do you notice when your blood sugar is low?  None    What concerns do you have today about your diabetes? None     Do you have any of these symptoms? (Select all that apply)  Numbness in feet        Hyperlipidemia Follow-Up      Are you regularly taking any medication or supplement to lower your cholesterol?   Yes- statin    Are you having muscle aches or other side effects that you think could be caused by your cholesterol lowering medication?  No    Hypertension Follow-up      Do you check your blood pressure regularly outside of the clinic? No     Are you following a low salt diet? No    Are your blood pressures ever more than 140 on the top number (systolic) OR more   than 90 on the bottom number (diastolic), for example 140/90? No    BP Readings from Last 2 Encounters:   08/26/20 124/66   10/23/19 122/72     Hemoglobin A1C (%)   Date Value   08/26/2020 5.9 (H)   05/28/2020 5.9 (H)     LDL Cholesterol Calculated (mg/dL)   Date Value   05/28/2020 10   09/25/2019     Cannot estimate LDL when triglyceride exceeds 400 mg/dL     LDL Cholesterol Direct (mg/dL)   Date Value   09/25/2019 58     BS  looking mostly good          Review of Systems   Constitutional,  "HEENT, cardiovascular, pulmonary, gi and gu systems are negative, except as otherwise noted.      Objective    /66 (BP Location: Right arm, Patient Position: Sitting, Cuff Size: Adult Large)   Pulse 72   Temp 96.8  F (36  C) (Temporal)   Resp 16   Ht 1.549 m (5' 1\")   Wt 68 kg (150 lb)   SpO2 93%   BMI 28.34 kg/m    Body mass index is 28.34 kg/m .  Physical Exam   GENERAL: healthy, alert and no distress  NECK: no adenopathy, no asymmetry, masses, or scars and thyroid normal to palpation  RESP: lungs clear to auscultation - no rales, rhonchi or wheezes  CV: regular rate and rhythm, normal S1 S2, no S3 or S4, no murmur, click or rub, no peripheral edema and peripheral pulses strong  ABDOMEN: soft, nontender, no hepatosplenomegaly, no masses and bowel sounds normal  MS: no gross musculoskeletal defects noted, no edema            Assessment & Plan       ICD-10-CM    1. Urine frequency  R35.0 oxybutynin (DITROPAN) 5 MG tablet   2. Chronic pain syndrome  G89.4 oxyCODONE (ROXICODONE) 5 MG tablet   3. Gastroesophageal reflux disease, esophagitis presence not specified  K21.9 omeprazole (PRILOSEC) 40 MG DR capsule   4. Essential hypertension with goal blood pressure less than 140/90  I10 metoprolol tartrate (LOPRESSOR) 25 MG tablet     furosemide (LASIX) 20 MG tablet     amLODIPine-benazepril (LOTREL) 5-20 MG capsule   5. Type 2 diabetes mellitus without complication, with long-term current use of insulin (H)  E11.9 metFORMIN (GLUCOPHAGE-XR) 500 MG 24 hr tablet    Z79.4 Hemoglobin A1c   6. Hyperlipidemia LDL goal <130  E78.5 rosuvastatin (CRESTOR) 20 MG tablet      For urine frequency will add oxybutynin after discussion of her options.  She declines pelvic floor PT, which I would prefer initially  Stable chronic pain, refill provided  Stable reflux disease, but change her PPI to 20 minutes before her largest meal so that she can have more relief  Stable hypertension, refills provided, labs updated  Stable " "controlled type 2 diabetes, on appropriate meds at this time  See her back in 3 to 4 months    BMI:   Estimated body mass index is 28.34 kg/m  as calculated from the following:    Height as of this encounter: 1.549 m (5' 1\").    Weight as of this encounter: 68 kg (150 lb).               Return in about 4 months (around 12/26/2020) for med check.    Alex Berumen MD  [employer]    "

## 2020-08-27 ASSESSMENT — ASTHMA QUESTIONNAIRES: ACT_TOTALSCORE: 25

## 2020-10-08 ENCOUNTER — TELEPHONE (OUTPATIENT)
Dept: FAMILY MEDICINE | Facility: CLINIC | Age: 80
End: 2020-10-08

## 2020-10-08 NOTE — TELEPHONE ENCOUNTER
Reason for Call:  Medication or medication refill:    Do you use a [facility]?  Name of the pharmacy and phone number for the current request:  [facility]    Name of the medication requested: metFORMIN (GLUCOPHAGE-XR) 500 MG 24 hr tablet    Other request: Camilo calling from the pharmacy, they would like Dr. Berumen or his nurse to call back about this medication, they have questions on if the patient is increasing her dose again. when you call use reference number [id number]    Can we leave a detailed message on this number? YES    Phone number patient can be reached at: Other phone number:  [phone]    Best Time: any    Call taken on 10/8/2020 at 5:29 PM by Cassandra Toledo

## 2020-10-21 DIAGNOSIS — G89.4 CHRONIC PAIN SYNDROME: ICD-10-CM

## 2020-10-21 NOTE — TELEPHONE ENCOUNTER
Oxycodone      Last Written Prescription Date:  09/22/2020  Last Fill Quantity: 112,   # refills: 0  Last Office Visit: 08/26/2020  Future Office visit:   None  Routing refill request to provider for review/approval because:  Drug not on the [facility], [facility]MP or [facility] refill protocol or controlled substance    Fadia Pak RN

## 2020-10-22 RX ORDER — OXYCODONE HYDROCHLORIDE 5 MG/1
TABLET ORAL
Qty: 112 TABLET | Refills: 0 | Status: SHIPPED | OUTPATIENT
Start: 2020-10-22 | End: 2020-11-19

## 2020-11-18 DIAGNOSIS — G89.4 CHRONIC PAIN SYNDROME: ICD-10-CM

## 2020-11-19 RX ORDER — OXYCODONE HYDROCHLORIDE 5 MG/1
TABLET ORAL
Qty: 112 TABLET | Refills: 0 | Status: SHIPPED | OUTPATIENT
Start: 2020-11-19 | End: 2020-12-18

## 2020-11-19 NOTE — TELEPHONE ENCOUNTER
Oxycodone      Last Written Prescription Date:  10/22/2020  Last Fill Quantity: 112,   # refills: 0  Last Office Visit: 08/26/2020  Future Office visit:   None  Routing refill request to provider for review/approval because:  Drug not on the [facility], UMP or [facility] refill protocol or controlled substance    Fadia Pak RN

## 2020-11-29 ENCOUNTER — HEALTH MAINTENANCE LETTER (OUTPATIENT)
Age: 80
End: 2020-11-29

## 2020-12-17 DIAGNOSIS — G89.4 CHRONIC PAIN SYNDROME: ICD-10-CM

## 2020-12-18 RX ORDER — OXYCODONE HYDROCHLORIDE 5 MG/1
TABLET ORAL
Qty: 112 TABLET | Refills: 0 | Status: SHIPPED | OUTPATIENT
Start: 2020-12-18 | End: 2021-01-19

## 2020-12-18 NOTE — TELEPHONE ENCOUNTER
Routing to covering provider as PCP is currently out of office.     Oxycodone      Last Written Prescription Date:  11/19/2020  Last Fill Quantity: 112,   # refills: 0  Last Office Visit: 08/26/2020  Future Office visit:   None  Routing refill request to provider for review/approval because:  Drug not on the FMG, P or [facility] refill protocol or controlled substance    Fadia Pak RN

## 2021-01-19 DIAGNOSIS — G89.4 CHRONIC PAIN SYNDROME: ICD-10-CM

## 2021-01-19 RX ORDER — OXYCODONE HYDROCHLORIDE 5 MG/1
TABLET ORAL
Qty: 112 TABLET | Refills: 0 | Status: SHIPPED | OUTPATIENT
Start: 2021-01-19 | End: 2021-02-16

## 2021-01-19 NOTE — TELEPHONE ENCOUNTER
Requested Prescriptions   Pending Prescriptions Disp Refills     oxyCODONE (ROXICODONE) 5 MG tablet [Pharmacy Med Name: OXYCODONE HCL 5MG TABS] 112 tablet 0     Sig: TAKE ONE TABLET BY MOUTH THREE TIMES A DAY TO FOUR TIMES A DAY AS NEEDED FOR ABDOMINAL PAIN ( 30 DAY SUPPLY)     Last Written Prescription Date:  12/18/2020  Last Fill Quantity: 112,   # refills: 0  Last Office Visit: 08/26/2020  Future Office visit:       Routing refill request to provider for review/approval because:  Drug not on the [facility], UMP or [facility] refill protocol or controlled substance    Tina Joya MA

## 2021-02-13 ENCOUNTER — IMMUNIZATION (OUTPATIENT)
Dept: NURSING | Facility: CLINIC | Age: 81
End: 2021-02-13
Payer: COMMERCIAL

## 2021-02-13 PROCEDURE — 91300 PR COVID VAC PFIZER DIL RECON 30 MCG/0.3 ML IM: CPT

## 2021-02-13 PROCEDURE — 0001A PR COVID VAC PFIZER DIL RECON 30 MCG/0.3 ML IM: CPT

## 2021-02-15 DIAGNOSIS — G89.4 CHRONIC PAIN SYNDROME: ICD-10-CM

## 2021-02-15 NOTE — TELEPHONE ENCOUNTER
Oxycodone      Last Written Prescription Date:  01/19/2021  Last Fill Quantity: 112,   # refills: 0  Last Office Visit: 08/26/2021  Future Office visit:   None  Routing refill request to provider for review/approval because:  Drug not on the [facility], [facility]MP or [facility] refill protocol or controlled substance    Fadia Pak RN

## 2021-02-16 RX ORDER — OXYCODONE HYDROCHLORIDE 5 MG/1
TABLET ORAL
Qty: 112 TABLET | Refills: 0 | Status: SHIPPED | OUTPATIENT
Start: 2021-02-16 | End: 2021-09-08

## 2021-03-02 ENCOUNTER — OFFICE VISIT (OUTPATIENT)
Dept: FAMILY MEDICINE | Facility: CLINIC | Age: 81
End: 2021-03-02
Payer: COMMERCIAL

## 2021-03-02 VITALS
WEIGHT: 148 LBS | DIASTOLIC BLOOD PRESSURE: 82 MMHG | OXYGEN SATURATION: 93 % | HEIGHT: 60 IN | BODY MASS INDEX: 29.06 KG/M2 | HEART RATE: 75 BPM | RESPIRATION RATE: 18 BRPM | TEMPERATURE: 97.8 F | SYSTOLIC BLOOD PRESSURE: 122 MMHG

## 2021-03-02 DIAGNOSIS — J45.40 MODERATE PERSISTENT ASTHMA WITHOUT COMPLICATION: Primary | ICD-10-CM

## 2021-03-02 DIAGNOSIS — Z79.4 TYPE 2 DIABETES MELLITUS WITHOUT COMPLICATION, WITH LONG-TERM CURRENT USE OF INSULIN (H): ICD-10-CM

## 2021-03-02 DIAGNOSIS — R35.0 URINE FREQUENCY: ICD-10-CM

## 2021-03-02 DIAGNOSIS — E11.9 TYPE 2 DIABETES MELLITUS WITHOUT COMPLICATION, WITH LONG-TERM CURRENT USE OF INSULIN (H): ICD-10-CM

## 2021-03-02 DIAGNOSIS — D64.9 ANEMIA, UNSPECIFIED TYPE: ICD-10-CM

## 2021-03-02 LAB
ANION GAP SERPL CALCULATED.3IONS-SCNC: 6 MMOL/L (ref 3–14)
BUN SERPL-MCNC: 15 MG/DL (ref 7–30)
CALCIUM SERPL-MCNC: 9.3 MG/DL (ref 8.5–10.1)
CHLORIDE SERPL-SCNC: 106 MMOL/L (ref 94–109)
CO2 SERPL-SCNC: 29 MMOL/L (ref 20–32)
CREAT SERPL-MCNC: 0.67 MG/DL (ref 0.52–1.04)
GFR SERPL CREATININE-BSD FRML MDRD: 82 ML/MIN/{1.73_M2}
GLUCOSE SERPL-MCNC: 93 MG/DL (ref 70–99)
HBA1C MFR BLD: 5.9 % (ref 0–5.6)
HGB BLD-MCNC: 13.5 G/DL (ref 11.7–15.7)
IRON SATN MFR SERPL: 10 % (ref 15–46)
IRON SERPL-MCNC: 39 UG/DL (ref 35–180)
POTASSIUM SERPL-SCNC: 4 MMOL/L (ref 3.4–5.3)
SODIUM SERPL-SCNC: 141 MMOL/L (ref 133–144)
TIBC SERPL-MCNC: 372 UG/DL (ref 240–430)

## 2021-03-02 PROCEDURE — 85018 HEMOGLOBIN: CPT | Performed by: FAMILY MEDICINE

## 2021-03-02 PROCEDURE — 83540 ASSAY OF IRON: CPT | Performed by: FAMILY MEDICINE

## 2021-03-02 PROCEDURE — 99214 OFFICE O/P EST MOD 30 MIN: CPT | Performed by: FAMILY MEDICINE

## 2021-03-02 PROCEDURE — 83036 HEMOGLOBIN GLYCOSYLATED A1C: CPT | Performed by: FAMILY MEDICINE

## 2021-03-02 PROCEDURE — 36415 COLL VENOUS BLD VENIPUNCTURE: CPT | Performed by: FAMILY MEDICINE

## 2021-03-02 PROCEDURE — 80048 BASIC METABOLIC PNL TOTAL CA: CPT | Performed by: FAMILY MEDICINE

## 2021-03-02 PROCEDURE — 83550 IRON BINDING TEST: CPT | Performed by: FAMILY MEDICINE

## 2021-03-02 RX ORDER — OXYBUTYNIN CHLORIDE 5 MG/1
5 TABLET ORAL DAILY
Qty: 90 TABLET | Refills: 1 | Status: SHIPPED | OUTPATIENT
Start: 2021-03-02 | End: 2021-09-08

## 2021-03-02 RX ORDER — PHENAZOPYRIDINE HYDROCHLORIDE 95 MG/1
190 TABLET ORAL 3 TIMES DAILY
COMMUNITY
End: 2024-06-26

## 2021-03-02 ASSESSMENT — PAIN SCALES - GENERAL: PAINLEVEL: NO PAIN (0)

## 2021-03-02 ASSESSMENT — MIFFLIN-ST. JEOR: SCORE: 1062.82

## 2021-03-02 NOTE — PROGRESS NOTES
Subjective     Recheck roque Quintero is a 80 year old female who comes to clinic   Loose stools, accidents for years  Poor urine control, wasnts to [facility] again  Using cbd for throat pain  Pain controlled on current regimen of 3-4 5 mg oxycodone daily    Review of Systems   Constitutional, HEENT, cardiovascular, pulmonary, gi and gu systems are negative, except as otherwise noted.      Objective    /82   Pulse 75   Temp 97.8  F (36.6  C) (Temporal)   Resp 18   Ht 1.524 m (5')   Wt 67.1 kg (148 lb)   SpO2 93%   BMI 28.90 kg/m       Wt Readings from Last 2 Encounters:   03/02/21 67.1 kg (148 lb)   08/26/20 68 kg (150 lb)       Physical Exam   Well-appearing.  Alert and oriented.  Heart regular.  Lungs clear.  Abdomen benign.  Oropharynx unremarkable    Diagnostic Test Results:  Labs reviewed in Epic        Assessment & Plan       ICD-10-CM    1. Moderate persistent asthma without complication  J45.40 Asthma Action Plan (AAP)   2. Urine frequency  R35.0 oxybutynin (DITROPAN) 5 MG tablet   3. Type 2 diabetes mellitus without complication, with long-term current use of insulin (H)  E11.9 blood glucose (NO BRAND SPECIFIED) test strip    Z79.4 Hemoglobin A1c     Basic metabolic panel   4. Anemia, unspecified type  D64.9 Hemoglobin     Iron and iron binding capacity       Looking back she has had an anemia that has been persistent we will work that up  Restart oxybutynin for urine frequency.  Strongly advised pelvic floor rehab, but she does not want that to occur.  Discussed side effects of dry mouth and imbalance and confusion with her  Refills provided for type 2 diabetes which historically has been very well controlled on 4 tabs of Metformin.  Unfortunate she had this loose stool, which does predate the Metformin.  But I told her given her excellent A1c, she could cut back to 2000 mg or even stop and see if her stools improve and she agrees  Chronic pain is controlled.  No evidence of escalation or  diversion.    No follow-ups on file.    Alex Berumen MD  [facility]

## 2021-03-03 ASSESSMENT — ASTHMA QUESTIONNAIRES: ACT_TOTALSCORE: 25

## 2021-03-06 ENCOUNTER — IMMUNIZATION (OUTPATIENT)
Dept: NURSING | Facility: CLINIC | Age: 81
End: 2021-03-06
Attending: INTERNAL MEDICINE
Payer: COMMERCIAL

## 2021-03-06 PROCEDURE — 0002A PR COVID VAC PFIZER DIL RECON 30 MCG/0.3 ML IM: CPT

## 2021-03-06 PROCEDURE — 91300 PR COVID VAC PFIZER DIL RECON 30 MCG/0.3 ML IM: CPT

## 2021-04-10 ENCOUNTER — HEALTH MAINTENANCE LETTER (OUTPATIENT)
Age: 81
End: 2021-04-10

## 2021-04-27 DIAGNOSIS — R35.0 URINE FREQUENCY: ICD-10-CM

## 2021-04-27 RX ORDER — OXYBUTYNIN CHLORIDE 5 MG/1
5 TABLET ORAL DAILY
Qty: 90 TABLET | Refills: 1 | OUTPATIENT
Start: 2021-04-27

## 2021-04-27 NOTE — TELEPHONE ENCOUNTER
Refused as duplicate see script 3/2/2021 dispense 90 with 1 refill.        Kaitlin Liao RN  [facility]

## 2021-07-25 ENCOUNTER — HEALTH MAINTENANCE LETTER (OUTPATIENT)
Age: 81
End: 2021-07-25

## 2021-09-08 ENCOUNTER — OFFICE VISIT (OUTPATIENT)
Dept: FAMILY MEDICINE | Facility: CLINIC | Age: 81
End: 2021-09-08
Payer: COMMERCIAL

## 2021-09-08 VITALS
HEART RATE: 78 BPM | BODY MASS INDEX: 28.51 KG/M2 | DIASTOLIC BLOOD PRESSURE: 72 MMHG | SYSTOLIC BLOOD PRESSURE: 116 MMHG | OXYGEN SATURATION: 96 % | WEIGHT: 146 LBS | TEMPERATURE: 97.6 F | RESPIRATION RATE: 18 BRPM

## 2021-09-08 DIAGNOSIS — E11.9 TYPE 2 DIABETES MELLITUS WITHOUT COMPLICATION, WITH LONG-TERM CURRENT USE OF INSULIN (H): ICD-10-CM

## 2021-09-08 DIAGNOSIS — K21.9 GASTROESOPHAGEAL REFLUX DISEASE WITHOUT ESOPHAGITIS: ICD-10-CM

## 2021-09-08 DIAGNOSIS — Z98.890 HISTORY OF ESOPHAGECTOMY: Primary | ICD-10-CM

## 2021-09-08 DIAGNOSIS — R35.0 URINE FREQUENCY: ICD-10-CM

## 2021-09-08 DIAGNOSIS — R10.13 EPIGASTRIC PAIN: ICD-10-CM

## 2021-09-08 DIAGNOSIS — Z90.49 HISTORY OF ESOPHAGECTOMY: Primary | ICD-10-CM

## 2021-09-08 DIAGNOSIS — G89.4 CHRONIC PAIN SYNDROME: ICD-10-CM

## 2021-09-08 DIAGNOSIS — Z79.4 TYPE 2 DIABETES MELLITUS WITHOUT COMPLICATION, WITH LONG-TERM CURRENT USE OF INSULIN (H): ICD-10-CM

## 2021-09-08 DIAGNOSIS — I10 ESSENTIAL HYPERTENSION WITH GOAL BLOOD PRESSURE LESS THAN 140/90: ICD-10-CM

## 2021-09-08 PROCEDURE — 99214 OFFICE O/P EST MOD 30 MIN: CPT | Performed by: FAMILY MEDICINE

## 2021-09-08 RX ORDER — OMEPRAZOLE 40 MG/1
CAPSULE, DELAYED RELEASE ORAL
Qty: 90 CAPSULE | Refills: 3 | Status: SHIPPED | OUTPATIENT
Start: 2021-09-08 | End: 2022-08-24

## 2021-09-08 RX ORDER — OXYCODONE HYDROCHLORIDE 5 MG/1
5 TABLET ORAL 3 TIMES DAILY PRN
Qty: 112 TABLET | Refills: 0 | Status: SHIPPED | OUTPATIENT
Start: 2021-09-08 | End: 2021-09-30

## 2021-09-08 RX ORDER — OXYBUTYNIN CHLORIDE 5 MG/1
5 TABLET ORAL 3 TIMES DAILY
Qty: 90 TABLET | Refills: 3 | Status: SHIPPED | OUTPATIENT
Start: 2021-09-08 | End: 2021-10-27

## 2021-09-08 RX ORDER — AMLODIPINE AND BENAZEPRIL HYDROCHLORIDE 5; 20 MG/1; MG/1
1 CAPSULE ORAL DAILY
Qty: 90 CAPSULE | Refills: 3 | Status: SHIPPED | OUTPATIENT
Start: 2021-09-08 | End: 2022-11-03

## 2021-09-08 RX ORDER — METOPROLOL TARTRATE 25 MG/1
25 TABLET, FILM COATED ORAL 2 TIMES DAILY
Qty: 180 TABLET | Refills: 3 | Status: SHIPPED | OUTPATIENT
Start: 2021-09-08 | End: 2022-11-03

## 2021-09-08 RX ORDER — METFORMIN HCL 500 MG
500 TABLET, EXTENDED RELEASE 24 HR ORAL
Qty: 180 TABLET | Refills: 3 | Status: SHIPPED | OUTPATIENT
Start: 2021-09-08 | End: 2021-10-14

## 2021-09-08 ASSESSMENT — PAIN SCALES - GENERAL: PAINLEVEL: NO PAIN (1)

## 2021-09-08 NOTE — PROGRESS NOTES
Assessment & Plan       ICD-10-CM    1. History of esophagectomy  Z98.890 GASTROENTEROLOGY ADULT REF CONSULT ONLY    Z90.49    2. Urine frequency  R35.0 oxybutynin (DITROPAN) 5 MG tablet   3. Chronic pain syndrome  G89.4 oxyCODONE (ROXICODONE) 5 MG tablet   4. Type 2 diabetes mellitus without complication, with long-term current use of insulin (H)  E11.9 metFORMIN (GLUCOPHAGE-XR) 500 MG 24 hr tablet    Z79.4 Hemoglobin A1c   5. Essential hypertension with goal blood pressure less than 140/90  I10 metoprolol tartrate (LOPRESSOR) 25 MG tablet     amLODIPine-benazepril (LOTREL) 5-20 MG capsule   6. Gastroesophageal reflux disease without esophagitis  K21.9 omeprazole (PRILOSEC) 40 MG DR capsule   7. Epigastric pain  R10.13 Comprehensive metabolic panel (BMP + Alb, Alk Phos, ALT, AST, Total. 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 Imaging and scopes in the last several years all remarkable. Low suspicion fo cancer recurrence, I think this is more functional issue. Not sure if there is enough esophagus for esophageal spasm??    Chronically has GERD, takes Prilosec, eats small meals, sleeps with her head elevated. With esophagectomy will check lab work, US and refer to back to gastroenterology. She had normal scopes in 2018.  Will adjust her times on her oxycodone so that she can take one at bedtime to help with her pain and allow her to sleep.    Urine frequency - stable but worsening. Uses incontinence products and will try Detrol from once a day to up to 3 times a day. Declines physical therapy.    HTN - stable. Continue metoprolol and amlodipine-benazepril, refills supplied.    DM type 2 - Due for recheck A1C, ordered.        No follow-ups on file.    Alex Berumen MD  [facility]  MITCH Quintero is a 81 year old female who presents to clinic today for the following health issues     HPI       Abdominal/Flank Pain  Onset/Duration: 3 months   Description:   Character: Sharp  Location: epigastric region  Radiation: None  Intensity: moderate  Progression of Symptoms:  same  Accompanying Signs & Symptoms:  Fever/Chills: no  Gas/Bloating: no  Nausea: YES  Vomitting: no  Diarrhea: YES  Constipation: no  Dysuria or Hematuria: no  History:   Trauma: no  Previous similar pain: no  Previous tests done: none  Precipitating factors:   Does the pain change with:     Food: YES    Bowel Movement: no    Urination: no   Other factors:  no  Therapies tried and outcome: None  No LMP recorded. Patient is postmenopausal.    Ingrid reports 3 months of worsening epigastric area pain. Has had her esophagus removed. She eats small meals, pain is worse with eating, occasional nausea, dry heaves and hiccups, no vomiting. Oxycodone helps the pain. Takes her prilosec daily. Reports 3 loose stools a day, which is not new for her. Denies blood with bowel movements.     Ingrid denies any chest pain, shortness of breath, weight changes.      Review of Systems   Constitutional, HEENT, cardiovascular, pulmonary, gi and gu systems are negative, except as otherwise noted.      Objective    /72   Pulse 78   Temp 97.6  F (36.4  C) (Temporal)   Resp 18   Wt 66.2 kg (146 lb)   SpO2 96%   BMI 28.51 kg/m       Physical Exam   GENERAL: healthy, alert and no distress  RESP: lungs clear to auscultation - no rales, rhonchi or wheezes  CV: regular rate and rhythm, normal S1 S2, no S3 or S4, no murmur, click or rub, no peripheral edema and peripheral pulses strong  ABDOMEN: tenderness epigastric, bowel sounds normal and well healed incision vertically over epigastric area and right lower quadrant  MS: no gross musculoskeletal defects noted, no edema       I am acting as a scribe for this visit. I have written  the the note, but final editing is from Dr. Ata Vega, NP student    This patient was seen and examined by myself as well as the NP student.  The NP student scribed the note and I have reviewed it, edited it appropriately, and agree with the final documentation.     Alex Berumen MD

## 2021-09-13 ENCOUNTER — HOSPITAL ENCOUNTER (OUTPATIENT)
Dept: ULTRASOUND IMAGING | Facility: CLINIC | Age: 81
Discharge: HOME | End: 2021-09-13
Attending: FAMILY MEDICINE | Admitting: FAMILY MEDICINE
Payer: COMMERCIAL

## 2021-09-13 ENCOUNTER — LAB (OUTPATIENT)
Dept: LAB | Facility: CLINIC | Age: 81
End: 2021-09-13
Payer: COMMERCIAL

## 2021-09-13 DIAGNOSIS — R10.13 EPIGASTRIC PAIN: ICD-10-CM

## 2021-09-13 DIAGNOSIS — Z79.4 TYPE 2 DIABETES MELLITUS WITHOUT COMPLICATION, WITH LONG-TERM CURRENT USE OF INSULIN (H): ICD-10-CM

## 2021-09-13 DIAGNOSIS — E11.9 TYPE 2 DIABETES MELLITUS WITHOUT COMPLICATION, WITH LONG-TERM CURRENT USE OF INSULIN (H): ICD-10-CM

## 2021-09-13 LAB
ALBUMIN SERPL-MCNC: 3.4 G/DL (ref 3.4–5)
ALP SERPL-CCNC: 51 U/L (ref 40–150)
ALT SERPL W P-5'-P-CCNC: 25 U/L (ref 0–50)
ANION GAP SERPL CALCULATED.3IONS-SCNC: 3 MMOL/L (ref 3–14)
AST SERPL W P-5'-P-CCNC: 13 U/L (ref 0–45)
BILIRUB SERPL-MCNC: 0.2 MG/DL (ref 0.2–1.3)
BUN SERPL-MCNC: 18 MG/DL (ref 7–30)
CALCIUM SERPL-MCNC: 8.9 MG/DL (ref 8.5–10.1)
CHLORIDE BLD-SCNC: 111 MMOL/L (ref 94–109)
CO2 SERPL-SCNC: 30 MMOL/L (ref 20–32)
CREAT SERPL-MCNC: 0.8 MG/DL (ref 0.52–1.04)
ERYTHROCYTE [DISTWIDTH] IN BLOOD BY AUTOMATED COUNT: 14.8 % (ref 10–15)
GFR SERPL CREATININE-BSD FRML MDRD: 69 ML/MIN/1.73M2
GLUCOSE BLD-MCNC: 93 MG/DL (ref 70–99)
HBA1C MFR BLD: 5.7 % (ref 0–5.6)
HCT VFR BLD AUTO: 40.2 % (ref 35–47)
HGB BLD-MCNC: 12.5 G/DL (ref 11.7–15.7)
LIPASE SERPL-CCNC: 82 U/L (ref 73–393)
MCH RBC QN AUTO: 25 PG (ref 26.5–33)
MCHC RBC AUTO-ENTMCNC: 31.1 G/DL (ref 31.5–36.5)
MCV RBC AUTO: 80 FL (ref 78–100)
PLATELET # BLD AUTO: 223 10E3/UL (ref 150–450)
POTASSIUM BLD-SCNC: 3.9 MMOL/L (ref 3.4–5.3)
PROT SERPL-MCNC: 6.7 G/DL (ref 6.8–8.8)
RBC # BLD AUTO: 5 10E6/UL (ref 3.8–5.2)
SODIUM SERPL-SCNC: 144 MMOL/L (ref 133–144)
WBC # BLD AUTO: 7.3 10E3/UL (ref 4–11)

## 2021-09-13 PROCEDURE — 80053 COMPREHEN METABOLIC PANEL: CPT

## 2021-09-13 PROCEDURE — 83690 ASSAY OF LIPASE: CPT

## 2021-09-13 PROCEDURE — 83036 HEMOGLOBIN GLYCOSYLATED A1C: CPT

## 2021-09-13 PROCEDURE — 76705 ECHO EXAM OF ABDOMEN: CPT

## 2021-09-13 PROCEDURE — 85027 COMPLETE CBC AUTOMATED: CPT

## 2021-09-13 PROCEDURE — 36415 COLL VENOUS BLD VENIPUNCTURE: CPT

## 2021-09-19 ENCOUNTER — HEALTH MAINTENANCE LETTER (OUTPATIENT)
Age: 81
End: 2021-09-19

## 2021-09-29 DIAGNOSIS — G89.4 CHRONIC PAIN SYNDROME: ICD-10-CM

## 2021-09-29 NOTE — TELEPHONE ENCOUNTER
Oxycodone      Last Written Prescription Date:  09/08/2021  Last Fill Quantity: 112,   # refills: 0  Last Office Visit: 09/08/2021  Future Office visit:   None  Routing refill request to provider for review/approval because:  Drug not on the [facility], [facility] or [facility] refill protocol or controlled substance    Fadia Pak RN

## 2021-09-30 RX ORDER — OXYCODONE HYDROCHLORIDE 5 MG/1
TABLET ORAL
Qty: 112 TABLET | Refills: 0 | Status: SHIPPED | OUTPATIENT
Start: 2021-09-30 | End: 2022-05-05

## 2021-10-11 DIAGNOSIS — E78.5 HYPERLIPIDEMIA LDL GOAL <130: ICD-10-CM

## 2021-10-12 ENCOUNTER — MYC MEDICAL ADVICE (OUTPATIENT)
Dept: FAMILY MEDICINE | Facility: CLINIC | Age: 81
End: 2021-10-12

## 2021-10-12 DIAGNOSIS — Z79.4 TYPE 2 DIABETES MELLITUS WITHOUT COMPLICATION, WITH LONG-TERM CURRENT USE OF INSULIN (H): ICD-10-CM

## 2021-10-12 DIAGNOSIS — E11.9 TYPE 2 DIABETES MELLITUS WITHOUT COMPLICATION, WITH LONG-TERM CURRENT USE OF INSULIN (H): ICD-10-CM

## 2021-10-12 RX ORDER — ROSUVASTATIN CALCIUM 20 MG/1
TABLET, COATED ORAL
Qty: 90 TABLET | Refills: 3 | Status: SHIPPED | OUTPATIENT
Start: 2021-10-12 | End: 2022-11-03

## 2021-10-14 ENCOUNTER — TELEPHONE (OUTPATIENT)
Dept: FAMILY MEDICINE | Facility: CLINIC | Age: 81
End: 2021-10-14

## 2021-10-14 RX ORDER — METFORMIN HCL 500 MG
500 TABLET, EXTENDED RELEASE 24 HR ORAL
Qty: 90 TABLET | Refills: 3 | Status: SHIPPED | OUTPATIENT
Start: 2021-10-14 | End: 2022-11-03

## 2021-10-27 DIAGNOSIS — R35.0 URINE FREQUENCY: ICD-10-CM

## 2021-10-27 NOTE — TELEPHONE ENCOUNTER
Pt needs Oxybutinin Rx sent to [facility] now that she is at her winter home in [place]. They will send it to her.

## 2021-10-28 RX ORDER — OXYBUTYNIN CHLORIDE 5 MG/1
5 TABLET ORAL 3 TIMES DAILY
Qty: 90 TABLET | Refills: 3 | Status: SHIPPED | OUTPATIENT
Start: 2021-10-28 | End: 2022-05-18

## 2021-10-28 NOTE — TELEPHONE ENCOUNTER
Prescription approved per [facility] Refill Protocol.  Patient switching pharmacies.     Fadia Pak Rn

## 2022-01-09 ENCOUNTER — HEALTH MAINTENANCE LETTER (OUTPATIENT)
Age: 82
End: 2022-01-09

## 2022-02-03 ENCOUNTER — TRANSFERRED RECORDS (OUTPATIENT)
Dept: HEALTH INFORMATION MANAGEMENT | Facility: CLINIC | Age: 82
End: 2022-02-03
Payer: COMMERCIAL

## 2022-02-17 ENCOUNTER — TRANSFERRED RECORDS (OUTPATIENT)
Dept: HEALTH INFORMATION MANAGEMENT | Facility: CLINIC | Age: 82
End: 2022-02-17
Payer: COMMERCIAL

## 2022-02-17 PROBLEM — G89.4 CHRONIC PAIN SYNDROME: Status: ACTIVE | Noted: 2018-05-22

## 2022-03-23 ENCOUNTER — MYC MEDICAL ADVICE (OUTPATIENT)
Dept: FAMILY MEDICINE | Facility: CLINIC | Age: 82
End: 2022-03-23
Payer: COMMERCIAL

## 2022-03-23 NOTE — LETTER
[address] [address]  [phone]        April 1, 2022    Ingrid Garcia  [address] [address] [address]              Dear Ingrid Garcia    This is to remind you that your annual wellness visit is due.    You may call our office at [phone], option 1 to schedule an appointment.    Please disregard this notice if you have already had your labs drawn or made an appointment.        Sincerely,    Sayra Shelby CMA

## 2022-03-30 ENCOUNTER — TRANSFERRED RECORDS (OUTPATIENT)
Dept: FAMILY MEDICINE | Facility: CLINIC | Age: 82
End: 2022-03-30
Payer: COMMERCIAL

## 2022-04-01 NOTE — TELEPHONE ENCOUNTER
I have written and printed a letter to the pt with the information below. Sayra Shelby CMA ([facility])

## 2022-05-01 ENCOUNTER — HEALTH MAINTENANCE LETTER (OUTPATIENT)
Age: 82
End: 2022-05-01

## 2022-05-05 DIAGNOSIS — G89.4 CHRONIC PAIN SYNDROME: ICD-10-CM

## 2022-05-05 NOTE — TELEPHONE ENCOUNTER
Patient will be out of medication in 5 days.  Patient is scheduling her annual wellness visit.    Amber BOLANOSO/

## 2022-05-06 RX ORDER — OXYCODONE HYDROCHLORIDE 5 MG/1
TABLET ORAL
Qty: 112 TABLET | Refills: 0 | Status: SHIPPED | OUTPATIENT
Start: 2022-05-06 | End: 2022-06-02

## 2022-05-16 DIAGNOSIS — R35.0 URINE FREQUENCY: ICD-10-CM

## 2022-05-16 DIAGNOSIS — I10 ESSENTIAL HYPERTENSION WITH GOAL BLOOD PRESSURE LESS THAN 140/90: ICD-10-CM

## 2022-05-16 DIAGNOSIS — Z79.4 TYPE 2 DIABETES MELLITUS WITHOUT COMPLICATION, WITH LONG-TERM CURRENT USE OF INSULIN (H): Primary | ICD-10-CM

## 2022-05-16 DIAGNOSIS — E11.9 TYPE 2 DIABETES MELLITUS WITHOUT COMPLICATION, WITH LONG-TERM CURRENT USE OF INSULIN (H): Primary | ICD-10-CM

## 2022-05-18 RX ORDER — OXYBUTYNIN CHLORIDE 5 MG/1
TABLET ORAL
Qty: 90 TABLET | Refills: 11 | Status: SHIPPED | OUTPATIENT
Start: 2022-05-18 | End: 2023-07-05

## 2022-05-18 NOTE — TELEPHONE ENCOUNTER
Lasix  Routing refill request to provider for review/approval because:  Not on current Medication list.     Fadia Pak Rn

## 2022-05-20 RX ORDER — FUROSEMIDE 20 MG
TABLET ORAL
Qty: 90 TABLET | Refills: 3 | Status: SHIPPED | OUTPATIENT
Start: 2022-05-20 | End: 2023-06-01

## 2022-06-01 ENCOUNTER — TELEPHONE (OUTPATIENT)
Dept: FAMILY MEDICINE | Facility: CLINIC | Age: 82
End: 2022-06-01
Payer: COMMERCIAL

## 2022-06-01 DIAGNOSIS — G89.4 CHRONIC PAIN SYNDROME: ICD-10-CM

## 2022-06-01 NOTE — TELEPHONE ENCOUNTER
Patient calling in regards to a hernia, this was discovered on a CT scan that she had while wintering in [place].  She wasn't having any pain at the time and it wasn't bothering her, but that has changed.  She has some discomfort and is hoping to see a general surgeon for a consult.    She will see you if necessary but there are no appointments available in the next two weeks.  Referral pended for Dx and Signature if you choose to approve.  Please let patient know what is decided.    Amber Clement XRO/

## 2022-06-01 NOTE — TELEPHONE ENCOUNTER
Oxycodone      Last Written Prescription Date:  5/6/2022  Last Fill Quantity: 112,   # refills: 0  Last Office Visit: 9/8/2021  Future Office visit:    Next 5 appointments (look out 90 days)      Jul 01, 2022 10:40 AM  (Arrive by 10:20 AM)  Annual Wellness Visit with Alex Berumen MD  [facility] ([facility] ) [address] [place] [id number] [phone]             Routing refill request to provider for review/approval because:  Drug not on the [facility], [facility] or [facility] refill protocol or controlled substance

## 2022-06-02 RX ORDER — OXYCODONE HYDROCHLORIDE 5 MG/1
TABLET ORAL
Qty: 112 TABLET | Refills: 0 | Status: SHIPPED | OUTPATIENT
Start: 2022-06-02 | End: 2022-06-29

## 2022-06-02 NOTE — TELEPHONE ENCOUNTER
If there is no pain, its not urgent.  I will see first.  Likely she will not need a surgery, so will not need a referral.  Hiatal hernias are rarely fixed unless are causing significant pain

## 2022-06-09 ENCOUNTER — OFFICE VISIT (OUTPATIENT)
Dept: FAMILY MEDICINE | Facility: CLINIC | Age: 82
End: 2022-06-09
Payer: COMMERCIAL

## 2022-06-09 VITALS
OXYGEN SATURATION: 95 % | HEART RATE: 76 BPM | DIASTOLIC BLOOD PRESSURE: 68 MMHG | TEMPERATURE: 97.1 F | WEIGHT: 146.8 LBS | SYSTOLIC BLOOD PRESSURE: 126 MMHG | BODY MASS INDEX: 28.67 KG/M2 | RESPIRATION RATE: 16 BRPM

## 2022-06-09 DIAGNOSIS — Z79.4 TYPE 2 DIABETES MELLITUS WITHOUT COMPLICATION, WITH LONG-TERM CURRENT USE OF INSULIN (H): ICD-10-CM

## 2022-06-09 DIAGNOSIS — I10 ESSENTIAL HYPERTENSION WITH GOAL BLOOD PRESSURE LESS THAN 140/90: Primary | ICD-10-CM

## 2022-06-09 DIAGNOSIS — E11.9 TYPE 2 DIABETES MELLITUS WITHOUT COMPLICATION, WITH LONG-TERM CURRENT USE OF INSULIN (H): ICD-10-CM

## 2022-06-09 DIAGNOSIS — H35.30 MACULAR DEGENERATION (SENILE) OF RETINA: ICD-10-CM

## 2022-06-09 DIAGNOSIS — G89.4 CHRONIC PAIN SYNDROME: ICD-10-CM

## 2022-06-09 LAB
ALBUMIN SERPL-MCNC: 3.5 G/DL (ref 3.4–5)
ALP SERPL-CCNC: 54 U/L (ref 40–150)
ALT SERPL W P-5'-P-CCNC: 31 U/L (ref 0–50)
AMPHETAMINES UR QL: NOT DETECTED
ANION GAP SERPL CALCULATED.3IONS-SCNC: 4 MMOL/L (ref 3–14)
AST SERPL W P-5'-P-CCNC: 20 U/L (ref 0–45)
BARBITURATES UR QL SCN: NOT DETECTED
BENZODIAZ UR QL SCN: NOT DETECTED
BILIRUB SERPL-MCNC: 0.4 MG/DL (ref 0.2–1.3)
BUN SERPL-MCNC: 16 MG/DL (ref 7–30)
BUPRENORPHINE UR QL: NOT DETECTED
CALCIUM SERPL-MCNC: 8.4 MG/DL (ref 8.5–10.1)
CANNABINOIDS UR QL: NOT DETECTED
CHLORIDE BLD-SCNC: 108 MMOL/L (ref 94–109)
CO2 SERPL-SCNC: 31 MMOL/L (ref 20–32)
COCAINE UR QL SCN: NOT DETECTED
CREAT SERPL-MCNC: 0.84 MG/DL (ref 0.52–1.04)
D-METHAMPHET UR QL: NOT DETECTED
GFR SERPL CREATININE-BSD FRML MDRD: 69 ML/MIN/1.73M2
GLUCOSE BLD-MCNC: 77 MG/DL (ref 70–99)
HBA1C MFR BLD: 5.7 % (ref 0–5.6)
METHADONE UR QL SCN: NOT DETECTED
OPIATES UR QL SCN: NOT DETECTED
OXYCODONE UR QL SCN: DETECTED
PCP UR QL SCN: NOT DETECTED
POTASSIUM BLD-SCNC: 3.8 MMOL/L (ref 3.4–5.3)
PROPOXYPH UR QL: NOT DETECTED
PROT SERPL-MCNC: 6.7 G/DL (ref 6.8–8.8)
SODIUM SERPL-SCNC: 143 MMOL/L (ref 133–144)
TRICYCLICS UR QL SCN: NOT DETECTED

## 2022-06-09 PROCEDURE — 99214 OFFICE O/P EST MOD 30 MIN: CPT | Performed by: FAMILY MEDICINE

## 2022-06-09 PROCEDURE — 36415 COLL VENOUS BLD VENIPUNCTURE: CPT | Performed by: FAMILY MEDICINE

## 2022-06-09 PROCEDURE — 80053 COMPREHEN METABOLIC PANEL: CPT | Performed by: FAMILY MEDICINE

## 2022-06-09 PROCEDURE — 83036 HEMOGLOBIN GLYCOSYLATED A1C: CPT | Performed by: FAMILY MEDICINE

## 2022-06-09 PROCEDURE — 80306 DRUG TEST PRSMV INSTRMNT: CPT | Performed by: FAMILY MEDICINE

## 2022-06-09 RX ORDER — FAMOTIDINE 40 MG/1
40 TABLET, FILM COATED ORAL DAILY
COMMUNITY
Start: 2022-01-26

## 2022-06-09 ASSESSMENT — PAIN SCALES - GENERAL: PAINLEVEL: NO PAIN (0)

## 2022-06-09 NOTE — PROGRESS NOTES
"Assessment & Plan       ICD-10-CM    1. Essential hypertension with goal blood pressure less than 140/90  I10    2. Macular degeneration (senile) of retina  H35.30    3. Chronic pain syndrome  G89.4 Urine Drugs of Abuse Screen Panel 13     Urine Drugs of Abuse Screen Panel 13   4. Type 2 diabetes mellitus without complication, with long-term current use of insulin (H)  E11.9 Comprehensive metabolic panel (BMP + Alb, Alk Phos, ALT, AST, Total. Bili, TP)    Z79.4 Hemoglobin A1c     Comprehensive metabolic panel (BMP + Alb, Alk Phos, ALT, AST, Total. Bili, TP)     Hemoglobin A1c        Patient with chronic pain returns for recheck.  [place] of [place] 6 months out of the year.  They are received a CT scan and EGD and may be heard \"ulcers\".  Was changed to pantoprazole.  Continues to note ongoing epigastric pain which is chronic for her, but increased as of late.  Continue with oxycodone and her PPI.  I need to obtain records for further recommendations.  Recheck labs today which she is due for.      No follow-ups on file.    Alex Berumen MD  [facility]    Marsha Quintero is a 82 year old female who presents to clinic today for the following health issues     HPI     More epigastric pain, more often, more intense   Had extensive work up in [place], need records  Time of relief shorter  Palpitations , less than min, no symptoms  occ warm sensation sin the feet bilat  All over body burning sensation        Review of Systems   Constitutional, HEENT, cardiovascular, pulmonary, gi and gu systems are negative, except as otherwise noted.      Objective    /68   Pulse 76   Temp 97.1  F (36.2  C)   Resp 16   Wt 66.6 kg (146 lb 12.8 oz)   SpO2 95%   BMI 28.67 kg/m       Physical Exam   Elderly.  Well-appearing.  Alert and oriented.  Good historian.  Neck supple without JVD.  Heart regular.  Lungs clear and unlabored.  Abdomen is mild tender in the epigastrium, no rigidity " distention or guarding.  Extremities warm appears normal pulses.  Neurologically nonfocal

## 2022-06-27 DIAGNOSIS — G89.4 CHRONIC PAIN SYNDROME: ICD-10-CM

## 2022-06-27 NOTE — TELEPHONE ENCOUNTER
Requested Prescriptions   Pending Prescriptions Disp Refills     oxyCODONE (ROXICODONE) 5 MG tablet [Pharmacy Med Name: OXYCODONE HCL 5MG TABS] 112 tablet 0     Sig: TAKE ONE TABLET BY MOUTH THREE TO FOUR TIMES DAILY AS NEEDED FOR SEVERE PAIN (30 DAY SUPPLY)       There is no refill protocol information for this order        Last Written Prescription Date:  6/2/2022  Last Fill Quantity: 112,  # refills: 0   Last office visit: 6/9/2022 with prescribing provider:     Future Office Visit:   Next 5 appointments (look out 90 days)    Jul 01, 2022 10:40 AM  (Arrive by 10:20 AM)  Annual Wellness Visit with Alex Berumen MD  [facility] ([facility] ) [address] [place] [id number] [phone]

## 2022-06-29 RX ORDER — OXYCODONE HYDROCHLORIDE 5 MG/1
TABLET ORAL
Qty: 112 TABLET | Refills: 0 | Status: SHIPPED | OUTPATIENT
Start: 2022-06-29 | End: 2022-07-28

## 2022-07-25 DIAGNOSIS — G89.4 CHRONIC PAIN SYNDROME: ICD-10-CM

## 2022-07-26 NOTE — TELEPHONE ENCOUNTER
Oxycodone      Last Written Prescription Date:  6/29/2022  Last Fill Quantity: 112,   # refills: 0  Last Office Visit: 6/9/2022  Future Office visit:       Routing refill request to provider for review/approval because:  Drug not on the [facility], [facility] or [facility] refill protocol or controlled substance    Aung Cornejo RN

## 2022-07-28 DIAGNOSIS — G89.4 CHRONIC PAIN SYNDROME: ICD-10-CM

## 2022-07-28 RX ORDER — OXYCODONE HYDROCHLORIDE 5 MG/1
TABLET ORAL
Qty: 112 TABLET | Refills: 0 | Status: SHIPPED | OUTPATIENT
Start: 2022-07-28 | End: 2022-08-26

## 2022-07-28 RX ORDER — OXYCODONE HYDROCHLORIDE 5 MG/1
TABLET ORAL
Qty: 112 TABLET | Refills: 0 | Status: CANCELLED | OUTPATIENT
Start: 2022-07-28

## 2022-07-28 NOTE — TELEPHONE ENCOUNTER
Duplicate request previous encounter still open from 7/25/2022 with same medications.   Tina Joya MA

## 2022-08-03 ENCOUNTER — MYC MEDICAL ADVICE (OUTPATIENT)
Dept: FAMILY MEDICINE | Facility: CLINIC | Age: 82
End: 2022-08-03

## 2022-08-04 NOTE — TELEPHONE ENCOUNTER
Per previous notes, she didn't need a referral for general surgery? Records came in from [place]. Any further interventions?    Michelle Madera,JIMBON, RN

## 2022-08-24 ENCOUNTER — VIRTUAL VISIT (OUTPATIENT)
Dept: FAMILY MEDICINE | Facility: CLINIC | Age: 82
End: 2022-08-24
Payer: COMMERCIAL

## 2022-08-24 VITALS — HEIGHT: 60 IN | BODY MASS INDEX: 28.67 KG/M2

## 2022-08-24 DIAGNOSIS — G89.29 CHRONIC EPIGASTRIC PAIN: ICD-10-CM

## 2022-08-24 DIAGNOSIS — G89.4 CHRONIC PAIN SYNDROME: Primary | ICD-10-CM

## 2022-08-24 DIAGNOSIS — R10.13 CHRONIC EPIGASTRIC PAIN: ICD-10-CM

## 2022-08-24 PROCEDURE — 99443 PR PHYSICIAN TELEPHONE EVALUATION 21-30 MIN: CPT | Performed by: FAMILY MEDICINE

## 2022-08-24 RX ORDER — PANTOPRAZOLE SODIUM 40 MG/1
40 TABLET, DELAYED RELEASE ORAL DAILY
COMMUNITY
Start: 2022-08-24 | End: 2023-12-06

## 2022-08-24 ASSESSMENT — ASTHMA QUESTIONNAIRES
QUESTION_2 LAST FOUR WEEKS HOW OFTEN HAVE YOU HAD SHORTNESS OF BREATH: NOT AT ALL
QUESTION_3 LAST FOUR WEEKS HOW OFTEN DID YOUR ASTHMA SYMPTOMS (WHEEZING, COUGHING, SHORTNESS OF BREATH, CHEST TIGHTNESS OR PAIN) WAKE YOU UP AT NIGHT OR EARLIER THAN USUAL IN THE MORNING: NOT AT ALL
QUESTION_4 LAST FOUR WEEKS HOW OFTEN HAVE YOU USED YOUR RESCUE INHALER OR NEBULIZER MEDICATION (SUCH AS ALBUTEROL): NOT AT ALL
ACT_TOTALSCORE: 25
QUESTION_5 LAST FOUR WEEKS HOW WOULD YOU RATE YOUR ASTHMA CONTROL: COMPLETELY CONTROLLED
ACT_TOTALSCORE: 25
QUESTION_1 LAST FOUR WEEKS HOW MUCH OF THE TIME DID YOUR ASTHMA KEEP YOU FROM GETTING AS MUCH DONE AT WORK, SCHOOL OR AT HOME: NONE OF THE TIME

## 2022-08-24 NOTE — PROGRESS NOTES
Ingrid is a 82 year old who is being evaluated via a billable video visit.      How would you like to obtain your AVS? MyChart  If the video visit is dropped, the invitation should be resent by: Text to cell phone: [phone]  Will anyone else be joining your video visit? No      Assessment & Plan       ICD-10-CM    1. Chronic pain syndrome  G89.4    2. Chronic epigastric pain  R10.13     G89.29           Patient with longstanding chronic epigastric pain returns for follow-up.  Reviewed her outside records.  EGD showed inflammation of the esophageal gastric junction.  Indicating higher levels of acid.  Will move her PPI closer to her largest meal to ensure adequate coverage.  May need to double her dose.  We will see how she does with that.  She will work on avoiding acid producing foods.  Will not increase her narcotics at this point.  May need nonnarcotic options moving forward    Return in about 2 weeks (around 9/7/2022) for recheck symptoms; GERD.    Alex Berumen MD  [facility]   Ingrid is a 82 year old, presenting for the following health issues:  Video Visit      History of Present Illness       Reason for visit:  Epigastric pain      Recheck chronic epigastric pain. S/p esophagectomy. Worsening over past mos. Reviewed notes from OSH EGD, CT. HH present. Erosions present, no ulcers. On pantoprazole 40mg in am.         Review of Systems   Constitutional, HEENT, cardiovascular, pulmonary, gi and gu systems are negative, except as otherwise noted.      Objective           Vitals:  No vitals were obtained today due to virtual visit.    Physical Exam     Telephone time-21 minutes      ..

## 2022-08-26 DIAGNOSIS — G89.4 CHRONIC PAIN SYNDROME: ICD-10-CM

## 2022-08-26 RX ORDER — OXYCODONE HYDROCHLORIDE 5 MG/1
TABLET ORAL
Qty: 112 TABLET | Refills: 0 | Status: SHIPPED | OUTPATIENT
Start: 2022-08-26 | End: 2022-09-28

## 2022-08-26 NOTE — TELEPHONE ENCOUNTER
FYI - Status Update    Who is Calling: patient    Update: calling for update    Does caller want a call/response back: Yes     Could we send this information to you in [place] or would you prefer to receive a phone call?:   Patient would prefer a phone call   Okay to leave a detailed message?: Yes at Cell number on file:    Telephone Information:   Mobile [phone]

## 2022-08-26 NOTE — TELEPHONE ENCOUNTER
Requested Prescriptions   Pending Prescriptions Disp Refills     oxyCODONE (ROXICODONE) 5 MG tablet [Pharmacy Med Name: OXYCODONE HCL 5MG TABS] 112 tablet 0     Sig: TAKE ONE TABLET BY MOUTH THREE TO FOUR TIMES DAILY AS NEEDED FOR SEVERE PAIN (30 DAY SUPPLY)     Last Written Prescription Date:  07/28/2022  Last Fill Quantity: 112,   # refills: 0  Last Office Visit: 08/24/2022  Future Office visit:    Next 5 appointments (look out 90 days)    Sep 07, 2022 11:40 AM  (Arrive by 11:20 AM)  Provider Visit with Alex Berumen MD  [facility] ([facility] ) [address] [place] [id number] [phone]           Routing refill request to provider for review/approval because:  Drug not on the [facility], [facility] or [facility] refill protocol or controlled substance

## 2022-09-07 ENCOUNTER — VIRTUAL VISIT (OUTPATIENT)
Dept: FAMILY MEDICINE | Facility: CLINIC | Age: 82
End: 2022-09-07
Payer: COMMERCIAL

## 2022-09-07 DIAGNOSIS — K21.9 GASTROESOPHAGEAL REFLUX DISEASE WITHOUT ESOPHAGITIS: Primary | ICD-10-CM

## 2022-09-07 DIAGNOSIS — Z90.49 HISTORY OF ESOPHAGECTOMY: ICD-10-CM

## 2022-09-07 DIAGNOSIS — Z98.890 HISTORY OF ESOPHAGECTOMY: ICD-10-CM

## 2022-09-07 PROCEDURE — 99442 PR PHYSICIAN TELEPHONE EVALUATION 11-20 MIN: CPT | Performed by: FAMILY MEDICINE

## 2022-09-07 NOTE — PROGRESS NOTES
Ingrid is a 82 year old who is being evaluated via a billable telephone visit.      What phone number would you like to be contacted at? [phone]  How would you like to obtain your AVS? MyChart    Assessment & Plan     No diagnosis found.       Continue with double dose PPI for the next 2 weeks.  Then try to wean back down to normal dose.  [place].  I will see her back when she returns in the spring.  She has had good surveillance of her esophageal cancer and esophagectomy.  Most likely her symptoms continue to be uncontrolled reflux    No follow-ups on file.    Alex Berumen MD  [facility]   Ingrid is a 82 year old, presenting for the following health issues:  Telephone    HPI     Follow up GERD    She has had a nice response to doubling her PPI.  No further epigastric pain.  Swallowing has improved.  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  History of mild esophagitis on her EGD which was done outside the system back in February 2022    Review of Systems         Objective         Vitals:  No vitals were obtained today due to virtual visit.    Physical Exam     PSYCH: Alert and oriented times 3; coherent speech, normal   rate and volume, able to articulate logical thoughts, able   to abstract reason, no tangential thoughts, no hallucinations   or delusions  Her affect is   RESP: No cough, no audible wheezing, able to talk in full sentences  Remainder of exam unable to be completed due to telephone visits                Phone call duration: 15 minutes    [unfilled]  [unfilled]

## 2022-09-22 DIAGNOSIS — G89.4 CHRONIC PAIN SYNDROME: ICD-10-CM

## 2022-09-23 NOTE — TELEPHONE ENCOUNTER
Requested Prescriptions   Pending Prescriptions Disp Refills     oxyCODONE (ROXICODONE) 5 MG tablet [Pharmacy Med Name: OXYCODONE HCL 5MG TABS] 112 tablet 0     Sig: TAKE ONE TABLET BY MOUTH THREE TO FOUR TIMES DAILY AS NEEDED FOR SEVERE PAIN (30 DAY SUPPLY)       Routing refill request to provider for review/approval because:  Drug not on the [facility], [facility] or [facility] refill protocol or controlled substance

## 2022-09-28 RX ORDER — OXYCODONE HYDROCHLORIDE 5 MG/1
TABLET ORAL
Qty: 112 TABLET | Refills: 0 | Status: SHIPPED | OUTPATIENT
Start: 2022-09-28 | End: 2022-10-25

## 2022-10-24 DIAGNOSIS — G89.4 CHRONIC PAIN SYNDROME: ICD-10-CM

## 2022-10-25 RX ORDER — OXYCODONE HYDROCHLORIDE 5 MG/1
TABLET ORAL
Qty: 112 TABLET | Refills: 0 | Status: SHIPPED | OUTPATIENT
Start: 2022-10-25 | End: 2023-06-02

## 2022-10-25 NOTE — TELEPHONE ENCOUNTER
Routing refill request to provider for review/approval because:    Requested Prescriptions   Pending Prescriptions Disp Refills    oxyCODONE (ROXICODONE) 5 MG tablet [Pharmacy Med Name: OXYCODONE HCL 5MG TABS] 112 tablet 0     Sig: TAKE ONE TABLET BY MOUTH THREE TO FOUR TIMES DAILY AS NEEDED FOR SEVERE PAIN (30 DAY SUPPLY)       There is no refill protocol information for this order

## 2022-11-21 ENCOUNTER — HEALTH MAINTENANCE LETTER (OUTPATIENT)
Age: 82
End: 2022-11-21

## 2023-01-24 DIAGNOSIS — E11.9 TYPE 2 DIABETES MELLITUS WITHOUT COMPLICATION, WITH LONG-TERM CURRENT USE OF INSULIN (H): ICD-10-CM

## 2023-01-24 DIAGNOSIS — Z79.4 TYPE 2 DIABETES MELLITUS WITHOUT COMPLICATION, WITH LONG-TERM CURRENT USE OF INSULIN (H): ICD-10-CM

## 2023-01-25 RX ORDER — METFORMIN HCL 500 MG
TABLET, EXTENDED RELEASE 24 HR ORAL
Qty: 90 TABLET | Refills: 3 | Status: SHIPPED | OUTPATIENT
Start: 2023-01-25

## 2023-01-25 NOTE — TELEPHONE ENCOUNTER
Routing refill request to provider for review/approval because:    Requested Prescriptions   Pending Prescriptions Disp Refills    metFORMIN (GLUCOPHAGE XR) 500 MG 24 hr tablet [Pharmacy Med Name: metFORMIN HCl  MG Oral Tablet Extended Release 24 Hour] 90 tablet 3     Sig: TAKE 1 TABLET BY MOUTH  DAILY WITH DINNER       Biguanide Agents Failed - 1/24/2023 10:34 PM        Failed - Patient has documented A1c within the specified period of time.     If HgbA1C is 8 or greater, it needs to be on file within the past 3 months.  If less than 8, must be on file within the past 6 months.     Recent Labs   Lab Test 06/09/22  1413   A1C 5.7*

## 2023-03-04 DIAGNOSIS — I10 ESSENTIAL HYPERTENSION WITH GOAL BLOOD PRESSURE LESS THAN 140/90: ICD-10-CM

## 2023-03-04 DIAGNOSIS — R35.0 URINE FREQUENCY: ICD-10-CM

## 2023-03-07 RX ORDER — FUROSEMIDE 20 MG
TABLET ORAL
Qty: 90 TABLET | Refills: 3 | OUTPATIENT
Start: 2023-03-07

## 2023-03-07 RX ORDER — OXYBUTYNIN CHLORIDE 5 MG/1
TABLET ORAL
Qty: 270 TABLET | Refills: 3 | OUTPATIENT
Start: 2023-03-07

## 2023-04-16 ENCOUNTER — HEALTH MAINTENANCE LETTER (OUTPATIENT)
Age: 83
End: 2023-04-16

## 2023-04-19 DIAGNOSIS — I10 ESSENTIAL HYPERTENSION WITH GOAL BLOOD PRESSURE LESS THAN 140/90: ICD-10-CM

## 2023-04-21 RX ORDER — AMLODIPINE AND BENAZEPRIL HYDROCHLORIDE 5; 20 MG/1; MG/1
1 CAPSULE ORAL DAILY
Qty: 90 CAPSULE | Refills: 0 | Status: SHIPPED | OUTPATIENT
Start: 2023-04-21 | End: 2023-09-06

## 2023-04-21 RX ORDER — METOPROLOL TARTRATE 25 MG/1
TABLET, FILM COATED ORAL
Qty: 180 TABLET | Refills: 0 | Status: SHIPPED | OUTPATIENT
Start: 2023-04-21

## 2023-04-21 NOTE — TELEPHONE ENCOUNTER
Prescription approved per [facility] Refill Protocol.  Waleska Mullins RN on 4/21/2023 at 11:53 AM

## 2023-06-01 DIAGNOSIS — G89.4 CHRONIC PAIN SYNDROME: ICD-10-CM

## 2023-06-01 DIAGNOSIS — I10 ESSENTIAL HYPERTENSION WITH GOAL BLOOD PRESSURE LESS THAN 140/90: ICD-10-CM

## 2023-06-02 ENCOUNTER — HEALTH MAINTENANCE LETTER (OUTPATIENT)
Age: 83
End: 2023-06-02

## 2023-06-02 RX ORDER — OXYCODONE HYDROCHLORIDE 5 MG/1
TABLET ORAL
Qty: 112 TABLET | Refills: 0 | Status: SHIPPED | OUTPATIENT
Start: 2023-06-02 | End: 2023-06-26

## 2023-06-02 RX ORDER — FUROSEMIDE 20 MG
20 TABLET ORAL DAILY
Qty: 90 TABLET | Refills: 3 | Status: SHIPPED | OUTPATIENT
Start: 2023-06-02 | End: 2023-07-05

## 2023-06-02 NOTE — TELEPHONE ENCOUNTER
Prescription approved per [facility] Refill Protocol.    Michelle Madera,BSN, RN

## 2023-06-25 DIAGNOSIS — G89.4 CHRONIC PAIN SYNDROME: ICD-10-CM

## 2023-06-26 RX ORDER — OXYCODONE HYDROCHLORIDE 5 MG/1
TABLET ORAL
Qty: 112 TABLET | Refills: 0 | Status: SHIPPED | OUTPATIENT
Start: 2023-06-26 | End: 2023-08-07

## 2023-07-05 ENCOUNTER — OFFICE VISIT (OUTPATIENT)
Dept: FAMILY MEDICINE | Facility: CLINIC | Age: 83
End: 2023-07-05
Payer: COMMERCIAL

## 2023-07-05 VITALS
WEIGHT: 138.6 LBS | HEIGHT: 60 IN | BODY MASS INDEX: 27.21 KG/M2 | OXYGEN SATURATION: 95 % | HEART RATE: 66 BPM | SYSTOLIC BLOOD PRESSURE: 132 MMHG | TEMPERATURE: 96.2 F | RESPIRATION RATE: 20 BRPM | DIASTOLIC BLOOD PRESSURE: 64 MMHG

## 2023-07-05 DIAGNOSIS — E11.9 TYPE 2 DIABETES MELLITUS WITHOUT COMPLICATION, WITH LONG-TERM CURRENT USE OF INSULIN (H): ICD-10-CM

## 2023-07-05 DIAGNOSIS — R35.0 URINE FREQUENCY: ICD-10-CM

## 2023-07-05 DIAGNOSIS — Z00.00 ANNUAL PHYSICAL EXAM: Primary | ICD-10-CM

## 2023-07-05 DIAGNOSIS — Z79.4 TYPE 2 DIABETES MELLITUS WITHOUT COMPLICATION, WITH LONG-TERM CURRENT USE OF INSULIN (H): ICD-10-CM

## 2023-07-05 DIAGNOSIS — G89.4 CHRONIC PAIN SYNDROME: ICD-10-CM

## 2023-07-05 DIAGNOSIS — F32.A DEPRESSION, UNSPECIFIED DEPRESSION TYPE: ICD-10-CM

## 2023-07-05 LAB
ANION GAP SERPL CALCULATED.3IONS-SCNC: 11 MMOL/L (ref 7–15)
BUN SERPL-MCNC: 10.3 MG/DL (ref 8–23)
CALCIUM SERPL-MCNC: 9.4 MG/DL (ref 8.8–10.2)
CHLORIDE SERPL-SCNC: 105 MMOL/L (ref 98–107)
CHOLEST SERPL-MCNC: 117 MG/DL
CREAT SERPL-MCNC: 0.82 MG/DL (ref 0.51–0.95)
DEPRECATED HCO3 PLAS-SCNC: 26 MMOL/L (ref 22–29)
GFR SERPL CREATININE-BSD FRML MDRD: 71 ML/MIN/1.73M2
GLUCOSE SERPL-MCNC: 93 MG/DL (ref 70–99)
HBA1C MFR BLD: 5.6 %
HDLC SERPL-MCNC: 43 MG/DL
LDLC SERPL CALC-MCNC: 34 MG/DL
NONHDLC SERPL-MCNC: 74 MG/DL
POTASSIUM SERPL-SCNC: 3.9 MMOL/L (ref 3.4–5.3)
SODIUM SERPL-SCNC: 142 MMOL/L (ref 136–145)
TRIGL SERPL-MCNC: 200 MG/DL

## 2023-07-05 PROCEDURE — 80061 LIPID PANEL: CPT | Performed by: FAMILY MEDICINE

## 2023-07-05 PROCEDURE — 80048 BASIC METABOLIC PNL TOTAL CA: CPT | Performed by: FAMILY MEDICINE

## 2023-07-05 PROCEDURE — G0439 PPPS, SUBSEQ VISIT: HCPCS | Performed by: FAMILY MEDICINE

## 2023-07-05 PROCEDURE — 99214 OFFICE O/P EST MOD 30 MIN: CPT | Mod: 25 | Performed by: FAMILY MEDICINE

## 2023-07-05 PROCEDURE — 36415 COLL VENOUS BLD VENIPUNCTURE: CPT | Performed by: FAMILY MEDICINE

## 2023-07-05 PROCEDURE — 83036 HEMOGLOBIN GLYCOSYLATED A1C: CPT | Performed by: FAMILY MEDICINE

## 2023-07-05 RX ORDER — OXYBUTYNIN CHLORIDE 5 MG/1
5 TABLET ORAL 3 TIMES DAILY
Qty: 90 TABLET | Refills: 11 | Status: SHIPPED | OUTPATIENT
Start: 2023-07-05 | End: 2023-11-16

## 2023-07-05 RX ORDER — OXYCODONE HYDROCHLORIDE 5 MG/1
TABLET ORAL
Qty: 112 TABLET | Refills: 0 | Status: CANCELLED | OUTPATIENT
Start: 2023-07-05

## 2023-07-05 ASSESSMENT — ANXIETY QUESTIONNAIRES
1. FEELING NERVOUS, ANXIOUS, OR ON EDGE: NOT AT ALL
7. FEELING AFRAID AS IF SOMETHING AWFUL MIGHT HAPPEN: SEVERAL DAYS
GAD7 TOTAL SCORE: 2
5. BEING SO RESTLESS THAT IT IS HARD TO SIT STILL: SEVERAL DAYS
6. BECOMING EASILY ANNOYED OR IRRITABLE: NOT AT ALL
3. WORRYING TOO MUCH ABOUT DIFFERENT THINGS: NOT AT ALL
IF YOU CHECKED OFF ANY PROBLEMS ON THIS QUESTIONNAIRE, HOW DIFFICULT HAVE THESE PROBLEMS MADE IT FOR YOU TO DO YOUR WORK, TAKE CARE OF THINGS AT HOME, OR GET ALONG WITH OTHER PEOPLE: NOT DIFFICULT AT ALL
2. NOT BEING ABLE TO STOP OR CONTROL WORRYING: NOT AT ALL
GAD7 TOTAL SCORE: 2
4. TROUBLE RELAXING: NOT AT ALL

## 2023-07-05 ASSESSMENT — ENCOUNTER SYMPTOMS
ABDOMINAL PAIN: 1
HEARTBURN: 0
COUGH: 1
NERVOUS/ANXIOUS: 0
PALPITATIONS: 0
CHILLS: 1
WEAKNESS: 1
NAUSEA: 0
HEMATURIA: 0
FEVER: 0
ARTHRALGIAS: 0
EYE PAIN: 1
FREQUENCY: 1
SORE THROAT: 0
HEADACHES: 0
CONSTIPATION: 0
JOINT SWELLING: 0
MYALGIAS: 0
HEMATOCHEZIA: 0
DIARRHEA: 0
DYSURIA: 1
PARESTHESIAS: 0
BREAST MASS: 0
SHORTNESS OF BREATH: 0
DIZZINESS: 0

## 2023-07-05 ASSESSMENT — PAIN SCALES - GENERAL: PAINLEVEL: NO PAIN (0)

## 2023-07-05 ASSESSMENT — PATIENT HEALTH QUESTIONNAIRE - PHQ9
SUM OF ALL RESPONSES TO PHQ QUESTIONS 1-9: 8
10. IF YOU CHECKED OFF ANY PROBLEMS, HOW DIFFICULT HAVE THESE PROBLEMS MADE IT FOR YOU TO DO YOUR WORK, TAKE CARE OF THINGS AT HOME, OR GET ALONG WITH OTHER PEOPLE: SOMEWHAT DIFFICULT
SUM OF ALL RESPONSES TO PHQ QUESTIONS 1-9: 8

## 2023-07-05 ASSESSMENT — ACTIVITIES OF DAILY LIVING (ADL): CURRENT_FUNCTION: HOUSEWORK REQUIRES ASSISTANCE

## 2023-07-05 ASSESSMENT — ASTHMA QUESTIONNAIRES: ACT_TOTALSCORE: 21

## 2023-07-05 NOTE — PROGRESS NOTES
SUBJECTIVE:   Ingrid is a 83 year old who presents for Preventive Visit.      7/5/2023     1:39 PM   Additional Questions   Roomed by Phyllis PURVIS.     Are you in the first 12 months of your Medicare coverage?  No    HPI      Losing eyesight  Concerned about fatigue  Worsening incontinence  Interrupted sleep  Notes some depression, trouble with aging        Have you ever done Advance Care Planning? (For example, a Health Directive, POLST, or a discussion with a medical provider or your loved ones about your wishes): No, advance care planning information given to patient to review.  Patient plans to discuss their wishes with loved ones or provider.         Fall risk  Fallen 2 or more times in the past year?: No  Any fall with injury in the past year?: No    Cognitive Screening   1) Repeat 3 items (Leader, Season, Table)    2) Clock draw: NORMAL  3) 3 item recall: Recalls 2 objects   Results: NORMAL clock, 1-2 items recalled: COGNITIVE IMPAIRMENT LESS LIKELY    Mini-CogTM Copyright IRMA Mota. Licensed by the author for use in [facility]; reprinted with permission (venus@[facility]). All rights reserved.      Do you have sleep apnea, excessive snoring or daytime drowsiness?: Daytime drowsiness    Reviewed and updated as needed this visit by clinical staff   Tobacco   Meds              Reviewed and updated as needed this visit by Provider                 Social History     Tobacco Use     Smoking status: Never     Smokeless tobacco: Never   Substance Use Topics     Alcohol use: Yes     Alcohol/week: 0.8 standard drinks of alcohol     Types: 1 Glasses of wine per week     Comment: daily 1-2 glasses of wine             7/5/2023     2:03 PM   Alcohol Use   Prescreen: >3 drinks/day or >7 drinks/week? No     Do you have a current opioid prescription?   Do you use any other controlled substances or medications that are not prescribed by a provider? None                Current providers sharing in care for this patient  include:     Patient Care Team:  Alex Berumen MD as PCP - General (Family Practice)  Alex Berumen MD as Assigned PCP  Latanya Herrera RD as Diabetes Educator (Dietitian, Registered)  Dixie Carpio DO as MD (Gastroenterology)  Alex Berumen MD as Assigned Pain Medication Provider    The following health maintenance items are reviewed in Epic and correct as of today:  Health Maintenance   Topic Date Due     DIABETIC FOOT EXAM  Never done     ANNUAL REVIEW OF HM ORDERS  Never done     ZOSTER IMMUNIZATION (1 of 2) Never done     DTAP/TDAP/TD IMMUNIZATION (2 - Tdap) 07/20/2015     MEDICARE ANNUAL WELLNESS VISIT  05/19/2018     EYE EXAM  05/19/2021     MICROALBUMIN  05/26/2021     ASTHMA ACTION PLAN  03/02/2022     COVID-19 Vaccine (5 - Pfizer series) 02/13/2023     URINE DRUG SCREEN  06/09/2023     INFLUENZA VACCINE (1) 09/01/2023     A1C  10/05/2023     ASTHMA CONTROL TEST  01/05/2024     BMP  07/05/2024     LIPID  07/05/2024     FALL RISK ASSESSMENT  07/05/2024     ADVANCE CARE PLANNING  07/05/2028     DEXA  06/10/2030     PHQ-2 (once per calendar year)  Completed     Pneumococcal Vaccine: 65+ Years  Completed     IPV IMMUNIZATION  Aged Out     MENINGITIS IMMUNIZATION  Aged Out         Pertinent mammograms are reviewed under the imaging tab.    Review of Systems   ROS: 10 point ROS neg other than the symptoms noted above in the HPI.    OBJECTIVE:   /64   Pulse 66   Temp (!) 96.2  F (35.7  C) (Temporal)   Resp 20   Ht 1.524 m (5')   Wt 62.9 kg (138 lb 9.6 oz)   SpO2 95%   BMI 27.07 kg/m   Estimated body mass index is 27.07 kg/m  as calculated from the following:    Height as of this encounter: 1.524 m (5').    Weight as of this encounter: 62.9 kg (138 lb 9.6 oz).  Physical Exam  GENERAL: healthy, alert and no distress  EYES: Eyes grossly normal to inspection, PERRL and conjunctivae and sclerae normal  HENT: ear canals and TM's normal, nose and mouth without ulcers or  lesions  NECK: no adenopathy, no asymmetry, masses, or scars and thyroid normal to palpation  RESP: lungs clear to auscultation - no rales, rhonchi or wheezes  CV: regular rate and rhythm, normal S1 S2, no S3 or S4, no murmur, click or rub, no peripheral edema and peripheral pulses strong  ABDOMEN: soft, nontender, no hepatosplenomegaly, no masses and bowel sounds normal  MS: no gross musculoskeletal defects noted, no edema  NEURO: Normal strength and tone, mentation intact and speech normal  Affect depressed    Diagnostic Test Results:  Labs reviewed in Epic    ASSESSMENT / PLAN:       ICD-10-CM    1. Annual physical exam  Z00.00       2. Urine frequency  R35.0 oxybutynin (DITROPAN) 5 MG tablet      3. Chronic pain syndrome  G89.4       4. Type 2 diabetes mellitus without complication, with long-term current use of insulin (H)  E11.9 Hemoglobin A1c    Z79.4 Basic metabolic panel  (Ca, Cl, CO2, Creat, Gluc, K, Na, BUN)     Lipid panel reflex to direct LDL Fasting     Hemoglobin A1c     Basic metabolic panel  (Ca, Cl, CO2, Creat, Gluc, K, Na, BUN)     Lipid panel reflex to direct LDL Fasting      5. Depression, unspecified depression type  F32.A         Annual topics covered  Ongoing urinary mixed incontinence.  Continue on oxybutynin.  Discussed cutting back, due to her current fatigue symptoms, but she declines.  Stop Lasix that should help.  Chronic pain.  After esophagectomy.  Currently controlled on oxycodone.  She is run out early, which is unusual for her.  I asked her  to keep an eye on her pill amounts.  We will see if this becomes a recurring issue.  Type 2 diabetes.  Historically well controlled.  Due for lab work today with further recommendations to follow.  Depression.  I do feel she is depressed in her visit today.  She notes her declining vision is a big cause, as well as her fatigue.  Fatigue is probably multifactorial related to her medications, age, poor sleep.  She declines  intervention.          COUNSELING:  Reviewed preventive health counseling, as reflected in patient instructions        She reports that she has never smoked. She has never used smokeless tobacco.      Appropriate preventive services were discussed with this patient, including applicable screening as appropriate for cardiovascular disease, diabetes, osteopenia/osteoporosis, and glaucoma.  As appropriate for age/gender, discussed screening for colorectal cancer, prostate cancer, breast cancer, and cervical cancer. Checklist reviewing preventive services available has been given to the patient.    Reviewed patients plan of care and provided an AVS. The Basic Care Plan (routine screening as documented in Health Maintenance) for Ingrid meets the Care Plan requirement. This Care Plan has been established and reviewed with the Patient.      Alex Berumen MD  [facility]    Identified Health Risks:    Answers for HPI/ROS submitted by the patient on 7/5/2023  If you checked off any problems, how difficult have these problems made it for you to do your work, take care of things at home, or get along with other people?: Somewhat difficult  PHQ9 TOTAL SCORE: 8  KLEBER 7 TOTAL SCORE: 2

## 2023-07-05 NOTE — LETTER
Opioid / Opioid Plus Controlled Substance Agreement    This is an agreement between you and your provider about the safe and appropriate use of controlled substance/opioids prescribed by your care team. Controlled substances are medicines that can cause physical and mental dependence (abuse).    There are strict laws about having and using these medicines. We here at [facility] are committing to working with you in your efforts to get better. To support you in this work, we ll help you schedule regular office appointments for medicine refills. If we must cancel or change your appointment for any reason, we ll make sure you have enough medicine to last until your next appointment.     As a Provider, I will:  Listen carefully to your concerns and treat you with respect.   Recommend a treatment plan that I believe is in your best interest. This plan may involve therapies other than opioid pain medication.   Talk with you often about the possible benefits, and the risk of harm of any medicine that we prescribe for you.   Provide a plan on how to taper (discontinue or go off) using this medicine if the decision is made to stop its use.    As a Patient, I understand that opioid(s):   Are a controlled substance prescribed by my care team to help me function or work and manage my condition(s).   Are strong medicines and can cause serious side effects such as:  Drowsiness, which can seriously affect my driving ability  A lower breathing rate, enough to cause death  Harm to my thinking ability   Depression   Abuse of and addiction to this medicine  Need to be taken exactly as prescribed. Combining opioids with certain medicines or chemicals (such as illegal drugs, sedatives, sleeping pills, and benzodiazepines) can be dangerous or even fatal. If I stop opioids suddenly, I may have severe withdrawal symptoms.  Do not work for all types of pain nor for all patients. If they re not helpful, I may be asked to stop  them.        The risks, benefits and side effects of these medicine(s) were explained to me. I agree that:  I will take part in other treatments as advised by my care team. This may be psychiatry or counseling, physical therapy, behavioral therapy, group treatment or a referral to a specialist.     I will keep all my appointments. I understand that this is part of the monitoring of opioids. My care team may require an office visit for EVERY opioid/controlled substance refill. If I miss appointments or don t follow instructions, my care team may stop my medicine.    I will take my medicines as prescribed. I will not change the dose or schedule unless my care team tells me to. There will be no refills if I run out early.     I may be asked to come to the clinic and complete a urine drug test or complete a pill count at any time. If I don t give a urine sample or participate in a pill count, the care team may stop my medicine.    I will only receive prescriptions from this clinic for chronic pain. If I am treated by another provider for acute pain issues, I will tell them that I am taking opioid pain medication for chronic pain and that I have a treatment agreement with this provider. I will inform my [facility] care team within one business day if I am given a prescription for any pain medication by another healthcare provider. My [facility] care team can contact other providers and pharmacists about my use of any medicines.    It is up to me to make sure that I don t run out of my medicines on weekends or holidays. If my care team is willing to refill my opioid prescription without a visit, I must request refills only during office hours. Refills may take up to 3 business days to process. I will use one pharmacy to fill all my opioid and other controlled substance prescriptions. I will notify the clinic about any changes to my insurance or medication availability.    I am responsible for my  prescriptions. If the medicine/prescription is lost, stolen or destroyed, it will not be replaced. I also agree not to share controlled substance medicines with anyone.    I am aware I should not use any illegal or recreational drugs. I agree not to drink alcohol unless my care team says I can.       If I enroll in the [place] Medical Cannabis program, I will tell my care team prior to my next refill.     I will tell my care team right away if I become pregnant, have a new medical problem treated outside of my regular clinic, or have a change in my medications.    I understand that this medicine can affect my thinking, judgment and reaction time. Alcohol and drugs affect the brain and body, which can affect the safety of my driving. Being under the influence of alcohol or drugs can affect my decision-making, behaviors, personal safety, and the safety of others. Driving while impaired (DWI) can occur if a person is driving, operating, or in physical control of a car, motorcycle, boat, snowmobile, ATV, motorbike, off-road vehicle, or any other motor vehicle ([place] Statute 169A.20). I understand the risk if I choose to drive or operate any vehicle or machinery.    I understand that if I do not follow any of the conditions above, my prescriptions or treatment may be stopped or changed.          Opioids  What You Need to Know    What are opioids?   Opioids are pain medicines that must be prescribed by a doctor. They are also known as narcotics.     Examples are:   morphine (MS Contin, Gretchen)  oxycodone (Oxycontin)  oxycodone and acetaminophen (Percocet)  hydrocodone and acetaminophen (Vicodin, Norco)   fentanyl patch (Duragesic)   hydromorphone (Dilaudid)   methadone  codeine (Tylenol #3)     What do opioids do well?   Opioids are best for severe short-term pain such as after a surgery or injury. They may work well for cancer pain. They may help some people with long-lasting (chronic) pain.     What do opioids NOT do  well?   Opioids never get rid of pain entirely, and they don t work well for most patients with chronic pain. Opioids don t reduce swelling, one of the causes of pain.                                    Other ways to manage chronic pain and improve function include:     Treat the health problem that may be causing pain  Anti-inflammation medicines, which reduce swelling and tenderness, such as ibuprofen (Advil, Motrin) or naproxen (Aleve)  Acetaminophen (Tylenol)  Antidepressants and anti-seizure medicines, especially for nerve pain  Topical treatments such as patches or creams  Injections or nerve blocks  Chiropractic or osteopathic treatment  Acupuncture, massage, deep breathing, meditation, visual imagery, aromatherapy  Use heat or ice at the pain site  Physical therapy   Exercise  Stop smoking  Take part in therapy       Risks and side effects     Talk to your doctor before you start or decide to keep taking opioids. Possible side effects include:    Lowering your breathing rate enough to cause death  Overdose, including death, especially if taking higher than prescribed doses  Worse depression symptoms; less pleasure in things you usually enjoy  Feeling tired or sluggish  Slower thoughts or cloudy thinking  Being more sensitive to pain over time; pain is harder to control  Trouble sleeping or restless sleep  Changes in hormone levels (for example, less testosterone)  Changes in sex drive or ability to have sex  Constipation  Unsafe driving  Itching and sweating  Dizziness  Nausea, throwing up and dry mouth    What else should I know about opioids?    Opioids may lead to dependence, tolerance, or addiction.    Dependence means that if you stop or reduce the medicine too quickly, you will have withdrawal symptoms. These include loose poop (diarrhea), jitters, flu-like symptoms, nervousness and tremors. Dependence is not the same as addiction.                     Tolerance means needing higher doses over time to  get the same effect. This may increase the chance of serious side effects.    Addiction is when people improperly use a substance that harms their body, their mind or their relations with others. Use of opiates can cause a relapse of addiction if you have a history of drug or alcohol abuse.    People who have used opioids for a long time may have a lower quality of life, worse depression, higher levels of pain and more visits to doctors.    You can overdose on opioids. Take these steps to lower your risk of overdose:    Recognize the signs:  Signs of overdose include decrease or loss of consciousness (blackout), slowed breathing, trouble waking up and blue lips. If someone is worried about overdose, they should call 911.    Talk to your doctor about Narcan (naloxone).   If you are at risk for overdose, you may be given a prescription for Narcan. This medicine very quickly reverses the effects of opioids.   If you overdose, a friend or family member can give you Narcan while waiting for the ambulance. They need to know the signs of overdose and how to give Narcan.     Don't use alcohol or street drugs.   Taking them with opioids can cause death.    Do not take any of these medicines unless your doctor says it s OK. Taking these with opioids can cause death:  Benzodiazepines, such as lorazepam (Ativan), alprazolam (Xanax) or diazepam (Valium)  Muscle relaxers, such as cyclobenzaprine (Flexeril)  Sleeping pills like zolpidem (Ambien)   Other opioids      How to keep you and other people safe while taking opioids:    Never share your opioids with others.  Opioid medicines are regulated by the Drug Enforcement Agency (BAM). Selling or sharing medications is a criminal act.    2. Be sure to store opioids in a secure place, locked up if possible. Young children can easily swallow them and overdose.    3. When you are traveling with your medicines, keep them in the original bottles. If you use a pill box, be sure you also  carry a copy of your medicine list from your clinic or pharmacy.    4. Safe disposal of opioids    Most pharmacies have places to get rid of medicine, called disposal kiosks. Medicine disposal options are also available in every [facility]. Search your county and  medication disposal  to find more options. You can find more details at:  https://www.[facility].mn./living-green/managing-unwanted-medications     I agree that my provider, clinic care team, and pharmacy may work with any city, state or federal law enforcement agency that investigates the misuse, sale, or other diversion of my controlled medicine. I will allow my provider to discuss my care with, or share a copy of, this agreement with any other treating provider, pharmacy or emergency room where I receive care.    I have read this agreement and have asked questions about anything I did not understand.    _______________________________________________________  Patient Signature - Ingrid Garcia _____________________                   Date     _______________________________________________________  Provider Signature - Alex Berumen MD   _____________________                   Date     _______________________________________________________  Witness Signature (required if provider not present while patient signing)   _____________________                   Date

## 2023-07-28 ENCOUNTER — MYC MEDICAL ADVICE (OUTPATIENT)
Dept: FAMILY MEDICINE | Facility: CLINIC | Age: 83
End: 2023-07-28
Payer: COMMERCIAL

## 2023-07-31 NOTE — TELEPHONE ENCOUNTER
Message left for patient to call back to discuss concerns. Also sent [employer] message.       From note 7/5/23:  Depression. I do feel she is depressed in her visit today. She notes her declining vision is a big cause, as well as her fatigue. Fatigue is probably multifactorial related to her medications, age, poor sleep. She declines intervention.     If you checked off any problems, how difficult have these problems made it for you to do your work, take care of things at home, or get along with other people?: Somewhat difficult  PHQ9 TOTAL SCORE: 8  KLEBER 7 TOTAL SCORE: 2    Michelle Madera,JIMBON, RN

## 2023-08-04 DIAGNOSIS — G89.4 CHRONIC PAIN SYNDROME: ICD-10-CM

## 2023-08-07 RX ORDER — OXYCODONE HYDROCHLORIDE 5 MG/1
TABLET ORAL
Qty: 112 TABLET | Refills: 0 | Status: SHIPPED | OUTPATIENT
Start: 2023-08-07 | End: 2023-09-01

## 2023-08-22 DIAGNOSIS — G89.4 CHRONIC PAIN SYNDROME: ICD-10-CM

## 2023-08-23 RX ORDER — OXYCODONE HYDROCHLORIDE 5 MG/1
TABLET ORAL
Qty: 112 TABLET | Refills: 0 | OUTPATIENT
Start: 2023-08-23

## 2023-08-30 DIAGNOSIS — G89.4 CHRONIC PAIN SYNDROME: ICD-10-CM

## 2023-09-01 RX ORDER — OXYCODONE HYDROCHLORIDE 5 MG/1
TABLET ORAL
Qty: 112 TABLET | Refills: 0 | Status: SHIPPED | OUTPATIENT
Start: 2023-09-01 | End: 2023-10-05

## 2023-09-05 DIAGNOSIS — I10 ESSENTIAL HYPERTENSION WITH GOAL BLOOD PRESSURE LESS THAN 140/90: ICD-10-CM

## 2023-09-06 RX ORDER — AMLODIPINE AND BENAZEPRIL HYDROCHLORIDE 5; 20 MG/1; MG/1
1 CAPSULE ORAL DAILY
Qty: 90 CAPSULE | Refills: 1 | Status: SHIPPED | OUTPATIENT
Start: 2023-09-06 | End: 2024-02-09

## 2023-10-02 DIAGNOSIS — G89.4 CHRONIC PAIN SYNDROME: ICD-10-CM

## 2023-10-05 RX ORDER — OXYCODONE HYDROCHLORIDE 5 MG/1
TABLET ORAL
Qty: 112 TABLET | Refills: 0 | Status: SHIPPED | OUTPATIENT
Start: 2023-10-05 | End: 2024-05-20

## 2023-10-06 ENCOUNTER — TELEPHONE (OUTPATIENT)
Dept: FAMILY MEDICINE | Facility: CLINIC | Age: 83
End: 2023-10-06

## 2023-10-06 NOTE — TELEPHONE ENCOUNTER
FYI - Status Update    Who is Calling: patient    Update: Patient wanting noted in her chart that her last fill she got from the pharmacy on her oxycodone was short 26 tablets. Patient stating her bottle stated 112. Patient also stating she did call the pharmacy and report it.     Patient stating today when she picks up her prescription she is going to have them count them in front of her.    Patient also wanted to let provider know she is leaving for [place] on 10/16 and will be gone for 6 months.    Does caller want a call/response back: No

## 2023-11-15 ENCOUNTER — TELEPHONE (OUTPATIENT)
Dept: FAMILY MEDICINE | Facility: CLINIC | Age: 83
End: 2023-11-15
Payer: COMMERCIAL

## 2023-11-15 DIAGNOSIS — R35.0 URINE FREQUENCY: ICD-10-CM

## 2023-11-15 NOTE — LETTER
[address] [address]  [phone]        November 21, 2023    Ingrid Garcia  [id number] [address] [place] [id number]          Dear Ingrid,    Filling remaining fills to new pharmacy. Please contact patient and let her know she will need to coordinate with [facility] to ensure they deliver to the correct address.     Sincerely,        Care Team

## 2023-11-15 NOTE — TELEPHONE ENCOUNTER
Patient needing Oxybutynin Rx and refills sent to [facility] with a note that states they should be mailed to her [place] address as she is gone for the winter.

## 2023-11-16 RX ORDER — OXYBUTYNIN CHLORIDE 5 MG/1
5 TABLET ORAL 3 TIMES DAILY
Qty: 90 TABLET | Refills: 3 | Status: SHIPPED | OUTPATIENT
Start: 2023-11-16 | End: 2024-04-15

## 2023-11-16 NOTE — TELEPHONE ENCOUNTER
Filling remaining fills to new pharmacy. Please contact patient and let her know she will need to coordinate with [facility] to ensure they deliver to the correct address.     Hanna Vail, JIMBON, RN

## 2023-11-21 NOTE — TELEPHONE ENCOUNTER
2nd attempt. Left message for patient to return call.      Sent letter  Florence Balbuena MA 11/21/2023

## 2023-11-26 ENCOUNTER — HEALTH MAINTENANCE LETTER (OUTPATIENT)
Age: 83
End: 2023-11-26

## 2023-12-02 DIAGNOSIS — Z79.4 TYPE 2 DIABETES MELLITUS WITHOUT COMPLICATION, WITH LONG-TERM CURRENT USE OF INSULIN (H): ICD-10-CM

## 2023-12-02 DIAGNOSIS — E11.9 TYPE 2 DIABETES MELLITUS WITHOUT COMPLICATION, WITH LONG-TERM CURRENT USE OF INSULIN (H): ICD-10-CM

## 2023-12-02 DIAGNOSIS — E78.5 HYPERLIPIDEMIA LDL GOAL <130: ICD-10-CM

## 2023-12-04 RX ORDER — METFORMIN HCL 500 MG
TABLET, EXTENDED RELEASE 24 HR ORAL
Qty: 90 TABLET | Refills: 3 | OUTPATIENT
Start: 2023-12-04

## 2023-12-04 RX ORDER — ROSUVASTATIN CALCIUM 20 MG/1
TABLET, COATED ORAL
Qty: 90 TABLET | Refills: 0 | Status: SHIPPED | OUTPATIENT
Start: 2023-12-04 | End: 2024-02-08

## 2023-12-06 DIAGNOSIS — G89.29 CHRONIC EPIGASTRIC PAIN: Primary | ICD-10-CM

## 2023-12-06 DIAGNOSIS — E11.9 TYPE 2 DIABETES MELLITUS WITHOUT COMPLICATION, WITH LONG-TERM CURRENT USE OF INSULIN (H): ICD-10-CM

## 2023-12-06 DIAGNOSIS — R35.0 URINE FREQUENCY: ICD-10-CM

## 2023-12-06 DIAGNOSIS — Z79.4 TYPE 2 DIABETES MELLITUS WITHOUT COMPLICATION, WITH LONG-TERM CURRENT USE OF INSULIN (H): ICD-10-CM

## 2023-12-06 DIAGNOSIS — I10 ESSENTIAL HYPERTENSION WITH GOAL BLOOD PRESSURE LESS THAN 140/90: ICD-10-CM

## 2023-12-06 DIAGNOSIS — E78.5 HYPERLIPIDEMIA LDL GOAL <130: ICD-10-CM

## 2023-12-06 DIAGNOSIS — R10.13 CHRONIC EPIGASTRIC PAIN: Primary | ICD-10-CM

## 2023-12-07 RX ORDER — METFORMIN HCL 500 MG
500 TABLET, EXTENDED RELEASE 24 HR ORAL
Qty: 90 TABLET | Refills: 3 | OUTPATIENT
Start: 2023-12-07

## 2023-12-07 RX ORDER — ROSUVASTATIN CALCIUM 20 MG/1
20 TABLET, COATED ORAL DAILY
Qty: 90 TABLET | Refills: 0 | OUTPATIENT
Start: 2023-12-07

## 2023-12-07 RX ORDER — OXYBUTYNIN CHLORIDE 5 MG/1
5 TABLET ORAL 3 TIMES DAILY
Qty: 90 TABLET | Refills: 3 | OUTPATIENT
Start: 2023-12-07

## 2023-12-07 RX ORDER — PANTOPRAZOLE SODIUM 40 MG/1
40 TABLET, DELAYED RELEASE ORAL DAILY
Qty: 90 TABLET | Refills: 3 | Status: SHIPPED | OUTPATIENT
Start: 2023-12-07

## 2023-12-07 RX ORDER — AMLODIPINE AND BENAZEPRIL HYDROCHLORIDE 5; 20 MG/1; MG/1
1 CAPSULE ORAL DAILY
Qty: 90 CAPSULE | Refills: 1 | OUTPATIENT
Start: 2023-12-07

## 2023-12-20 DIAGNOSIS — R35.0 URINE FREQUENCY: ICD-10-CM

## 2023-12-20 RX ORDER — OXYBUTYNIN CHLORIDE 5 MG/1
5 TABLET ORAL 3 TIMES DAILY
Qty: 270 TABLET | Refills: 3 | OUTPATIENT
Start: 2023-12-20

## 2024-02-01 DIAGNOSIS — R35.0 URINE FREQUENCY: ICD-10-CM

## 2024-02-02 RX ORDER — OXYBUTYNIN CHLORIDE 5 MG/1
5 TABLET ORAL 3 TIMES DAILY
Qty: 90 TABLET | Refills: 11 | OUTPATIENT
Start: 2024-02-02

## 2024-02-03 DIAGNOSIS — E78.5 HYPERLIPIDEMIA LDL GOAL <130: ICD-10-CM

## 2024-02-08 DIAGNOSIS — I10 ESSENTIAL HYPERTENSION WITH GOAL BLOOD PRESSURE LESS THAN 140/90: ICD-10-CM

## 2024-02-08 RX ORDER — ROSUVASTATIN CALCIUM 20 MG/1
TABLET, COATED ORAL
Qty: 90 TABLET | Refills: 3 | Status: SHIPPED | OUTPATIENT
Start: 2024-02-08

## 2024-02-09 RX ORDER — AMLODIPINE AND BENAZEPRIL HYDROCHLORIDE 5; 20 MG/1; MG/1
1 CAPSULE ORAL DAILY
Qty: 90 CAPSULE | Refills: 1 | Status: SHIPPED | OUTPATIENT
Start: 2024-02-09 | End: 2024-07-23

## 2024-04-13 DIAGNOSIS — R35.0 URINE FREQUENCY: ICD-10-CM

## 2024-04-14 ENCOUNTER — HEALTH MAINTENANCE LETTER (OUTPATIENT)
Age: 84
End: 2024-04-14

## 2024-04-15 ENCOUNTER — TELEPHONE (OUTPATIENT)
Dept: FAMILY MEDICINE | Facility: CLINIC | Age: 84
End: 2024-04-15
Payer: COMMERCIAL

## 2024-04-15 RX ORDER — OXYBUTYNIN CHLORIDE 5 MG/1
5 TABLET ORAL 3 TIMES DAILY
Qty: 90 TABLET | Refills: 2 | Status: SHIPPED | OUTPATIENT
Start: 2024-04-15 | End: 2024-07-20

## 2024-04-15 NOTE — TELEPHONE ENCOUNTER
Medication Question or Refill        What medication are you calling about (include dose and sig)?: oxybutynin 5mg    Preferred Pharmacy:                 [facility] ([facility]) - [address]  [address]  [facility] [address]  Phone: [phone] Fax: [phone]          Controlled Substance Agreement on file:   CSA -- Patient Level:     [Media Unavailable] Controlled Substance Agreement - Opioid - Scan on 7/6/2023 11:46 AM: OPIOID AGREEMENT       Who prescribed the medication?: Alex Berumen MD    Do you need a refill? Yes    When did you use the medication last? 4/12    Patient offered an appointment? Yes: [place] pt out of state     Do you have any questions or concerns?  No      Could we send this information to you in [facility] or would you prefer to receive a phone call?:   Patient would prefer a phone call   Okay to leave a detailed message?: Yes at Home number on file [phone] (home)

## 2024-04-15 NOTE — TELEPHONE ENCOUNTER
Already sent today. Please call patient to inform and have her contact pharmacy.     Hanna Vail, JIMBON, RN

## 2024-05-20 DIAGNOSIS — G89.4 CHRONIC PAIN SYNDROME: ICD-10-CM

## 2024-05-20 NOTE — TELEPHONE ENCOUNTER
Patient is calling and asking for a refill as noted below.  She stated she has just returned back from [place] and would like her refill sent to the [facility] pharmacy.      Oxycodone  Routing refill request to provider for review/approval because:  Drug not on the [facility] refill protocol     Julieta Cramer RN

## 2024-05-22 RX ORDER — OXYCODONE HYDROCHLORIDE 5 MG/1
5 TABLET ORAL EVERY 6 HOURS PRN
Qty: 112 TABLET | Refills: 0 | Status: SHIPPED | OUTPATIENT
Start: 2024-05-22 | End: 2024-06-25

## 2024-06-05 ENCOUNTER — HOSPITAL ENCOUNTER (EMERGENCY)
Facility: CLINIC | Age: 84
Discharge: HOME | End: 2024-06-05
Attending: FAMILY MEDICINE | Admitting: FAMILY MEDICINE
Payer: COMMERCIAL

## 2024-06-05 ENCOUNTER — NURSE TRIAGE (OUTPATIENT)
Dept: FAMILY MEDICINE | Facility: CLINIC | Age: 84
End: 2024-06-05
Payer: COMMERCIAL

## 2024-06-05 ENCOUNTER — PATIENT OUTREACH (OUTPATIENT)
Dept: CARE COORDINATION | Facility: CLINIC | Age: 84
End: 2024-06-05
Payer: COMMERCIAL

## 2024-06-05 VITALS
BODY MASS INDEX: 28.32 KG/M2 | DIASTOLIC BLOOD PRESSURE: 77 MMHG | WEIGHT: 145 LBS | SYSTOLIC BLOOD PRESSURE: 130 MMHG | OXYGEN SATURATION: 94 % | HEART RATE: 85 BPM | TEMPERATURE: 98.5 F | RESPIRATION RATE: 16 BRPM

## 2024-06-05 DIAGNOSIS — N39.0 URINARY TRACT INFECTION WITHOUT HEMATURIA, SITE UNSPECIFIED: ICD-10-CM

## 2024-06-05 LAB
ALBUMIN UR-MCNC: 30 MG/DL
APPEARANCE UR: CLEAR
BACTERIA #/AREA URNS HPF: ABNORMAL /HPF
BILIRUB UR QL STRIP: NEGATIVE
COLOR UR AUTO: YELLOW
GLUCOSE UR STRIP-MCNC: NEGATIVE MG/DL
HGB UR QL STRIP: NEGATIVE
HYALINE CASTS: 13 /LPF
KETONES UR STRIP-MCNC: NEGATIVE MG/DL
LEUKOCYTE ESTERASE UR QL STRIP: ABNORMAL
NITRATE UR QL: NEGATIVE
PH UR STRIP: 6 [PH] (ref 5–7)
RBC URINE: 0 /HPF
SP GR UR STRIP: 1.01 (ref 1–1.03)
SQUAMOUS EPITHELIAL: 1 /HPF
UROBILINOGEN UR STRIP-MCNC: 4 MG/DL
WBC CLUMPS #/AREA URNS HPF: PRESENT /HPF
WBC URINE: >182 /HPF

## 2024-06-05 PROCEDURE — 99283 EMERGENCY DEPT VISIT LOW MDM: CPT | Performed by: FAMILY MEDICINE

## 2024-06-05 PROCEDURE — 87186 SC STD MICRODIL/AGAR DIL: CPT | Performed by: FAMILY MEDICINE

## 2024-06-05 PROCEDURE — 87086 URINE CULTURE/COLONY COUNT: CPT | Performed by: FAMILY MEDICINE

## 2024-06-05 PROCEDURE — 81001 URINALYSIS AUTO W/SCOPE: CPT | Performed by: FAMILY MEDICINE

## 2024-06-05 RX ORDER — CIPROFLOXACIN 500 MG/1
500 TABLET, FILM COATED ORAL 2 TIMES DAILY
Qty: 14 TABLET | Refills: 0 | Status: SHIPPED | OUTPATIENT
Start: 2024-06-05 | End: 2024-06-12

## 2024-06-05 ASSESSMENT — COLUMBIA-SUICIDE SEVERITY RATING SCALE - C-SSRS
6. HAVE YOU EVER DONE ANYTHING, STARTED TO DO ANYTHING, OR PREPARED TO DO ANYTHING TO END YOUR LIFE?: NO
1. IN THE PAST MONTH, HAVE YOU WISHED YOU WERE DEAD OR WISHED YOU COULD GO TO SLEEP AND NOT WAKE UP?: NO
2. HAVE YOU ACTUALLY HAD ANY THOUGHTS OF KILLING YOURSELF IN THE PAST MONTH?: NO

## 2024-06-05 ASSESSMENT — ACTIVITIES OF DAILY LIVING (ADL): ADLS_ACUITY_SCORE: 35

## 2024-06-05 NOTE — ED TRIAGE NOTES
Pt here with concerns of UTI, pain with urination and odor       Triage Assessment (Adult)       Row Name 06/05/24 1631          Triage Assessment    Airway WDL WDL        Respiratory WDL    Respiratory WDL WDL

## 2024-06-05 NOTE — ED PROVIDER NOTES
ED Provider Note     Ingrid Garcia  [id number]  June 5, 2024      CC:     Chief Complaint   Patient presents with    Dysuria          History is obtained from patient.    HPI: Ingrid Garcia is a 84 year old female presenting with 2-week history of urinary symptoms with foul smell, and burning with urination.  She denies any fever, chills, nausea, vomiting, abdominal or flank pain.  Patient has a history of kidney stones but not for the past 20+ years.  She had been putting this off but now the order is becoming stronger and she is having some burning.  No prior history of kidney infections.  Medical records were reviewed.  No previous UTIs dating back to 2018.      PMH/Problem List:   Past Medical History:   Diagnosis Date    Esophageal adenocarcinoma (H) 2011    HTN (hypertension)     Hyperlipidemia     Kidney stones 3/2015    Overactive bladder     Thyroid nodule        PSH:   Past Surgical History:   Procedure Laterality Date    APPENDECTOMY OPEN  10/12/2012    Procedure: APPENDECTOMY OPEN;;  Surgeon: Sarath Valentin MD;  Location: [facility]    ATTEMPTED LAPAROSCOPY  10/12/2012    Procedure: ATTEMPTED LAPAROSCOPY;  Attempted Laparoscopic appendectomy, open appendectomy.;  Surgeon: Sarath Valentin MD;  Location: [facility] OR    COLONOSCOPY  08/27/08    ESOPHAGOGASTRECTOMY  3/2011    [facility]    ESOPHAGOSCOPY, GASTROSCOPY, DUODENOSCOPY (EGD), COMBINED  6/30/2014    Procedure: COMBINED ESOPHAGOSCOPY, GASTROSCOPY, DUODENOSCOPY (EGD);  Surgeon: Estevan Cota MD;  Location:  GI    ESOPHAGOSCOPY, GASTROSCOPY, DUODENOSCOPY (EGD), COMBINED N/A 9/2/2016    Procedure: COMBINED ESOPHAGOSCOPY, GASTROSCOPY, DUODENOSCOPY (EGD);  Surgeon: Juan Antonio Esteban MD;  Location:  GI    ESOPHAGOSCOPY, GASTROSCOPY, DUODENOSCOPY (EGD), DILATATION, COMBINED  6/1/2011    Procedure:COMBINED ESOPHAGOSCOPY, GASTROSCOPY, DUODENOSCOPY (EGD), DILATATION;  Surgeon:KITTY BUCK; Location:[facility] GI     UGI ENDOSCOPY DIAG W BIOPSY  10/28/09     UGI ENDOSCOPY DIAG W OR W/O BRUSH/WASH  05/24/2004     UGI ENDOSCOPY DIAG W OR W/O BRUSH/WASH  08/10/2005    [facility] COLONOSCOPY W/WO BRUSH/WASH  05/24/2004    [facility] UGI ENDOSCOPY, SIMPLE EXAM  08/27/08       MEDS:   No current facility-administered medications for this encounter.     Current Outpatient Medications   Medication Sig Dispense Refill    ciprofloxacin (CIPRO) 500 MG tablet Take 1 tablet (500 mg) by mouth 2 times daily for 7 days 14 tablet 0    amLODIPine-benazepril (LOTREL) 5-20 MG capsule TAKE 1 CAPSULE BY MOUTH DAILY 90 capsule 1    aspirin-acetaminophen-caffeine (EXCEDRIN MIGRAINE) 250-250-65 MG tablet Take 1 tablet by mouth every 6 hours as needed for headaches      blood glucose (NO BRAND SPECIFIED) test strip Use to test blood sugar 1 times daily or as directed. (Patient not taking: Reported on 6/9/2022) 100 strip 3    blood glucose (ONETOUCH VERIO IQ) test strip Use to test blood sugars 1 times daily or as directed. (Patient not taking: Reported on 6/9/2022) 50 strip 6    blood glucose monitoring (ONE TOUCH DELICA) lancets Use to test blood sugar 1 times daily or as directed.  Ok to substitute alternative if insurance prefers. (Patient not taking: Reported on 6/9/2022) 100 each 6    calcium carbonate-vitamin D (OS-CRISTIN) 600-400 MG-UNIT chewable tablet Take 1 chew tab by mouth 2 times daily 180 tablet 3    COENZYME Q-10 PO Take 200 mg by mouth.      cyanocolbalamin (VITAMIN  B-12) 1000 MCG tablet Take by mouth daily      famotidine (PEPCID) 40 MG tablet Take 40 mg by mouth daily      loperamide (IMODIUM A-D) 2 MG tablet Take 1-2 tablets by mouth once a week (Patient not taking: Reported on 7/5/2023)      metFORMIN (GLUCOPHAGE XR) 500 MG 24 hr tablet TAKE 1 TABLET BY MOUTH DAILY  WITH DINNER 90 tablet 3    metoprolol tartrate (LOPRESSOR) 25 MG tablet TAKE 1 TABLET BY MOUTH TWICE  DAILY 180 tablet 0     Multiple Vitamin (MULTI-VITAMIN) per tablet Take 1 tablet by mouth daily.      Multiple Vitamins-Minerals (OCUVITE PRESERVISION PO)       naproxen (NAPROSYN) 500 MG tablet Take 1 tablet (500 mg) by mouth 2 times daily as needed for moderate pain (Patient not taking: Reported on 7/5/2023) 20 tablet 0    oxyBUTYnin (DITROPAN) 5 MG tablet TAKE 1 TABLET BY MOUTH 3 TIMES  DAILY 90 tablet 2    oxyCODONE (ROXICODONE) 5 MG tablet Take 1 tablet (5 mg) by mouth every 6 hours as needed for severe pain 30 day supply 112 tablet 0    pantoprazole (PROTONIX) 40 MG EC tablet Take 1 tablet (40 mg) by mouth daily 90 tablet 3    phenazopyridine (AZO URINARY PAIN RELIEF) 95 MG tablet Take 190 mg by mouth 3 times daily (Patient not taking: Reported on 7/5/2023)      rosuvastatin (CRESTOR) 20 MG tablet TAKE 1 TABLET BY MOUTH DAILY 90 tablet 3       Allergies: Morphine sulfate    Triage and nursing notes were reviewed.    ROS: All other systems were reviewed and are negative    Physical Exam:  Vitals:    06/05/24 1633   BP: 114/68   Pulse: 103   Resp: 16   Temp: 98.5  F (36.9  C)   TempSrc: Oral   SpO2: 98%   Weight: 65.8 kg (145 lb)     GENERAL APPEARANCE: Alert and oriented x 3, pleasant, no distress  HEAD: atraumatic  EYES: PER  HENT: Normal external exam.  Lips are dry  RESP: lungs clear to auscultation - no rales, rhonchi or wheezes  CV: regular rate and rhythm, no significant murmurs or rubs  ABDOMEN: soft, nontender, no CVA tenderness  SKIN: no rash; as above        Labs/Imaging Results:  Results for orders placed or performed during the hospital encounter of 06/05/24 (from the past 24 hour(s))   UA with Microscopic reflex to Culture    Specimen: Urine, Midstream   Result Value Ref Range    Color Urine Yellow Colorless, Straw, Light Yellow, Yellow    Appearance Urine Clear Clear    Glucose Urine Negative Negative mg/dL    Bilirubin Urine Negative Negative    Ketones Urine Negative Negative mg/dL    Specific Gravity Urine 1.008  1.003 - 1.035    Blood Urine Negative Negative    pH Urine 6.0 5.0 - 7.0    Protein Albumin Urine 30 (A) Negative mg/dL    Urobilinogen Urine 4.0 (A) Normal, 2.0 mg/dL    Nitrite Urine Negative Negative    Leukocyte Esterase Urine Large (A) Negative    Bacteria Urine Many (A) None Seen /HPF    WBC Clumps Urine Present (A) None Seen /HPF    RBC Urine 0 <=2 /HPF    WBC Urine >182 (H) <=5 /HPF    Squamous Epithelials Urine 1 <=1 /HPF    Hyaline Casts Urine 13 (H) <=2 /LPF    Narrative    Urine Culture ordered based on laboratory criteria             Impression:  Final diagnoses:   Urinary tract infection without hematuria, site unspecified         ED Course & Medical Decision Making (Plan):  Ingrid Garcia is a 84 year old female with 2-week history of dysuria and strong odor to the urine.  Symptoms are worsening instead of improving.  She does not have any fever, chills, nausea, vomiting, flank or abdominal pain.  No prior history of kidney infections.  No significant flank pain to suggest kidney stone.    Vital signs reveal a temp of 98.5, blood pressure 114/68, heart rate of 103, respirations 16, 98% oxygen saturation.  On exam, lung and heart exams are normal.  No CVA tenderness.  No abdominal tenderness.    Urinalysis reveals large leukocytes, many bacteria, with greater than 182 white blood cells.  Urine culture is pending.  Patient was informed of the findings.  Begin Cipro 500 mg twice a day for 7 days.  Push fluids.  See discharge instructions below.  I recommended a recheck of the urine in 10-14 days.    Written after-visit summary and instructions were given at the time of discharge.          Follow Up Plan:  Alex Berumen MD  [address] [place] [address] [phone]    In 10 days      [facility] Emergency Dept  [address] [place] [address] [phone]    If symptoms worsen        Discharge Instructions:  You have a urinary tract infection.  There  are a large amount of white blood cells in your urine.  Continue to push lots of fluids, and take cranberry pills or juice.  Start Cipro 500 mg twice a day for 7 days.  Follow-up with your primary care provider in 10-14 days to make sure that the infection is cleared.  Return to the emergency department at any time if your symptoms worsen.          Disclaimer: This note consists of words and symbols derived from keyboarding and dictation using voice recognition software.  As a result, there may be errors that have gone undetected.  Please consider this when interpreting information found in this note.       Minna Esquivel MD  06/05/24 3429

## 2024-06-05 NOTE — TELEPHONE ENCOUNTER
Nurse Triage SBAR    Is this a 2nd Level Triage? YES, LICENSED PRACTITIONER REVIEW IS REQUIRED    Situation: Patient states she has had difficulty urinating, painful urination, foul odor x1 week.    Assessment: Patient states she has moderate to severe pain with urinating and feels pressure in pelvic area, foul smelling urine. Mouth and lips are very dry and feeling light headed with movements. Denies fever, abdominal, flank pain, chills, accelerated HR.  Protocol Recommended Disposition:   Go To ED/UCC Now (Or To Office With PCP Approval)    Recommendation TO be seen in ED now to r/o UTI and dehydration. Patient agreed and  will drive her to [place] ER now,    Does the patient meet one of the following criteria for ADS visit consideration? 16+ years old, with an [facility] PCP     Mary Munoz RN  [facility] - Registered Nurse  Clinic Triage [place]   June 5, 2024      Reason for Disposition   Decreased urination and drinking very little and dehydration suspected (e.g., dark urine, no urine > 12 hours, very dry mouth, very lightheaded)    Additional Information   Negative: Shock suspected (e.g., cold/pale/clammy skin, too weak to stand, low BP, rapid pulse)   Negative: Sounds like a life-threatening emergency to the triager   Negative: Followed a female genital area injury (e.g., labia, vagina, vulva)   Negative: Followed a male genital area injury (penis, scrotum)   Negative: Vaginal discharge   Negative: Pus (white, yellow) or bloody discharge from end of penis   Negative: Pain or burning with passing urine (urination) and pregnant   Negative: Pain or burning with passing urine (urination) and female   Negative: Pain or burning with passing urine (urination) and male   Negative: Pain or itching in the vulvar area   Negative: Pain in scrotum is main symptom   Negative: Blood in the urine is main symptom   Negative: Symptoms arising from use of a urinary catheter (e.g., [place], [place])   Negative: Unable to  "urinate (or only a few drops) > 4 hours and bladder feels very full (e.g., palpable bladder or strong urge to urinate)    Answer Assessment - Initial Assessment Questions  1. SYMPTOM: \"What's the main symptom you're concerned about?\" (e.g., frequency, incontinence)    Pain itching   2. ONSET: \"When did the  6 days ago  start?\"        3. PAIN: \"Is there any pain?\" If Yes, ask: \"How bad is it?\" (Scale: 1-10; mild, moderate, severe)     moderate  4. CAUSE: \"What do you think is causing the symptoms?\"      uti  5. OTHER SYMPTOMS: \"Do you have any other symptoms?\" (e.g., blood in urine, fever, flank pain, pain with urination)      Pain with urination  6. PREGNANCY: \"Is there any chance you are pregnant?\" \"When was your last menstrual period?\"      N/a    Protocols used: Urinary Symptoms-A-OH    "

## 2024-06-05 NOTE — DISCHARGE INSTRUCTIONS
You have a urinary tract infection.  There are a large amount of white blood cells in your urine.  Continue to push lots of fluids, and take cranberry pills or juice.  Start Cipro 500 mg twice a day for 7 days.  Follow-up with your primary care provider in 10-14 days to make sure that the infection is cleared.  Return to the emergency department at any time if your symptoms worsen.

## 2024-06-06 LAB — BACTERIA UR CULT: ABNORMAL

## 2024-06-07 ENCOUNTER — TELEPHONE (OUTPATIENT)
Dept: NURSING | Facility: CLINIC | Age: 84
End: 2024-06-07
Payer: COMMERCIAL

## 2024-06-07 RX ORDER — SULFAMETHOXAZOLE/TRIMETHOPRIM 800-160 MG
1 TABLET ORAL 2 TIMES DAILY
Qty: 6 TABLET | Refills: 0 | Status: SHIPPED | OUTPATIENT
Start: 2024-06-07 | End: 2024-06-10

## 2024-06-07 NOTE — TELEPHONE ENCOUNTER
[facility]    Reason for call: Lab Result Notification     Lab Result (including Rx patient on, if applicable).  If culture, copy of lab report at bottom.  Lab Result: See below  Ciprofloxacin (CIPRO) 500 MG tablet Take 1 tablet (500 mg) by mouth 2 times daily for 7 days RESISTANT    Creatinine Level (mg/dl)   Creatinine   Date Value Ref Range Status   07/05/2023 0.82 0.51 - 0.95 mg/dL Final   03/02/2021 0.67 0.52 - 1.04 mg/dL Final    Creatinine clearance (ml/min), if applicable    Serum creatinine: 0.82 mg/dL 07/05/23 1507  Estimated creatinine clearance: 43.2 mL/min     RN Recommendations/Instructions per [place] ED lab result protocol:   [facility] ED lab result protocol utilized: urine culture  Advise to discontinue current antibiotic.   Instruct to start antibiotic, sent to preferred pharmacy.   Allergies reviewed. Push fluids. Patient verbalized understanding.     Patient's current Symptoms:   Decreased burning, no fever, no pain.     Patient/care giver notified to contact your PCP clinic or return to the Emergency department if your:  Symptoms return.  Symptoms do not improve after 3 days on antibiotic.  Symptoms do not resolve after completing antibiotic.  Symptoms worsen or other concerning symptoms.       Khoa Cervantes RN    no

## 2024-06-09 DIAGNOSIS — R35.0 URINE FREQUENCY: ICD-10-CM

## 2024-06-10 RX ORDER — OXYBUTYNIN CHLORIDE 5 MG/1
5 TABLET ORAL 3 TIMES DAILY
Qty: 270 TABLET | Refills: 3 | OUTPATIENT
Start: 2024-06-10

## 2024-06-18 ENCOUNTER — TRANSFERRED RECORDS (OUTPATIENT)
Dept: HEALTH INFORMATION MANAGEMENT | Facility: CLINIC | Age: 84
End: 2024-06-18
Payer: COMMERCIAL

## 2024-06-18 LAB — RETINOPATHY: NEGATIVE

## 2024-06-19 ENCOUNTER — PATIENT OUTREACH (OUTPATIENT)
Dept: CARE COORDINATION | Facility: CLINIC | Age: 84
End: 2024-06-19
Payer: COMMERCIAL

## 2024-06-24 DIAGNOSIS — G89.4 CHRONIC PAIN SYNDROME: ICD-10-CM

## 2024-06-25 RX ORDER — OXYCODONE HYDROCHLORIDE 5 MG/1
5 TABLET ORAL EVERY 6 HOURS PRN
Qty: 112 TABLET | Refills: 0 | Status: SHIPPED | OUTPATIENT
Start: 2024-06-25 | End: 2024-07-23

## 2024-06-26 ENCOUNTER — OFFICE VISIT (OUTPATIENT)
Dept: FAMILY MEDICINE | Facility: CLINIC | Age: 84
End: 2024-06-26
Payer: COMMERCIAL

## 2024-06-26 VITALS
DIASTOLIC BLOOD PRESSURE: 74 MMHG | BODY MASS INDEX: 26.19 KG/M2 | RESPIRATION RATE: 16 BRPM | HEART RATE: 95 BPM | SYSTOLIC BLOOD PRESSURE: 106 MMHG | HEIGHT: 60 IN | OXYGEN SATURATION: 96 % | TEMPERATURE: 98.2 F | WEIGHT: 133.4 LBS

## 2024-06-26 DIAGNOSIS — I10 ESSENTIAL HYPERTENSION WITH GOAL BLOOD PRESSURE LESS THAN 140/90: ICD-10-CM

## 2024-06-26 DIAGNOSIS — E11.9 TYPE 2 DIABETES MELLITUS WITHOUT COMPLICATION, WITH LONG-TERM CURRENT USE OF INSULIN (H): Primary | ICD-10-CM

## 2024-06-26 DIAGNOSIS — Z98.890 HISTORY OF ESOPHAGECTOMY: ICD-10-CM

## 2024-06-26 DIAGNOSIS — Z90.49 HISTORY OF ESOPHAGECTOMY: ICD-10-CM

## 2024-06-26 DIAGNOSIS — R30.0 DYSURIA: ICD-10-CM

## 2024-06-26 DIAGNOSIS — H35.30 MACULAR DEGENERATION (SENILE) OF RETINA: ICD-10-CM

## 2024-06-26 DIAGNOSIS — Z79.4 TYPE 2 DIABETES MELLITUS WITHOUT COMPLICATION, WITH LONG-TERM CURRENT USE OF INSULIN (H): Primary | ICD-10-CM

## 2024-06-26 DIAGNOSIS — Z86.19 HISTORY OF ESBL E. COLI INFECTION: ICD-10-CM

## 2024-06-26 DIAGNOSIS — G89.4 CHRONIC PAIN SYNDROME: ICD-10-CM

## 2024-06-26 LAB
ALBUMIN UR-MCNC: NEGATIVE MG/DL
ANION GAP SERPL CALCULATED.3IONS-SCNC: 10 MMOL/L (ref 7–15)
APPEARANCE UR: ABNORMAL
BACTERIA #/AREA URNS HPF: ABNORMAL /HPF
BILIRUB UR QL STRIP: NEGATIVE
BUN SERPL-MCNC: 10.4 MG/DL (ref 8–23)
CALCIUM SERPL-MCNC: 9.5 MG/DL (ref 8.8–10.2)
CHLORIDE SERPL-SCNC: 102 MMOL/L (ref 98–107)
CHOLEST SERPL-MCNC: 113 MG/DL
COLOR UR AUTO: YELLOW
CREAT SERPL-MCNC: 0.83 MG/DL (ref 0.51–0.95)
CREAT UR-MCNC: 37 MG/DL
CREAT UR-MCNC: 37.3 MG/DL
DEPRECATED HCO3 PLAS-SCNC: 28 MMOL/L (ref 22–29)
EGFRCR SERPLBLD CKD-EPI 2021: 69 ML/MIN/1.73M2
FASTING STATUS PATIENT QL REPORTED: NO
FASTING STATUS PATIENT QL REPORTED: NO
GLUCOSE SERPL-MCNC: 91 MG/DL (ref 70–99)
GLUCOSE UR STRIP-MCNC: NEGATIVE MG/DL
HBA1C MFR BLD: 5.2 %
HDLC SERPL-MCNC: 64 MG/DL
HGB UR QL STRIP: NEGATIVE
KETONES UR STRIP-MCNC: NEGATIVE MG/DL
LDLC SERPL CALC-MCNC: 27 MG/DL
LEUKOCYTE ESTERASE UR QL STRIP: ABNORMAL
MICROALBUMIN UR-MCNC: 37 MG/L
MICROALBUMIN/CREAT UR: 99.2 MG/G CR (ref 0–25)
MUCOUS THREADS #/AREA URNS LPF: PRESENT /LPF
NITRATE UR QL: NEGATIVE
NONHDLC SERPL-MCNC: 49 MG/DL
PH UR STRIP: 6 [PH] (ref 5–7)
POTASSIUM SERPL-SCNC: 3.8 MMOL/L (ref 3.4–5.3)
RBC URINE: 0 /HPF
SODIUM SERPL-SCNC: 140 MMOL/L (ref 135–145)
SP GR UR STRIP: 1 (ref 1–1.03)
SQUAMOUS EPITHELIAL: 1 /HPF
TRIGL SERPL-MCNC: 112 MG/DL
UROBILINOGEN UR STRIP-MCNC: NORMAL MG/DL
WBC CLUMPS #/AREA URNS HPF: PRESENT /HPF
WBC URINE: >182 /HPF

## 2024-06-26 PROCEDURE — 87186 SC STD MICRODIL/AGAR DIL: CPT | Performed by: FAMILY MEDICINE

## 2024-06-26 PROCEDURE — 80061 LIPID PANEL: CPT | Performed by: FAMILY MEDICINE

## 2024-06-26 PROCEDURE — 83036 HEMOGLOBIN GLYCOSYLATED A1C: CPT | Performed by: FAMILY MEDICINE

## 2024-06-26 PROCEDURE — 82570 ASSAY OF URINE CREATININE: CPT | Performed by: FAMILY MEDICINE

## 2024-06-26 PROCEDURE — 81001 URINALYSIS AUTO W/SCOPE: CPT | Performed by: FAMILY MEDICINE

## 2024-06-26 PROCEDURE — 87086 URINE CULTURE/COLONY COUNT: CPT | Performed by: FAMILY MEDICINE

## 2024-06-26 PROCEDURE — 82043 UR ALBUMIN QUANTITATIVE: CPT | Performed by: FAMILY MEDICINE

## 2024-06-26 PROCEDURE — 99214 OFFICE O/P EST MOD 30 MIN: CPT | Performed by: FAMILY MEDICINE

## 2024-06-26 PROCEDURE — G2211 COMPLEX E/M VISIT ADD ON: HCPCS | Performed by: FAMILY MEDICINE

## 2024-06-26 PROCEDURE — G0481 DRUG TEST DEF 8-14 CLASSES: HCPCS | Performed by: FAMILY MEDICINE

## 2024-06-26 PROCEDURE — 36415 COLL VENOUS BLD VENIPUNCTURE: CPT | Performed by: FAMILY MEDICINE

## 2024-06-26 PROCEDURE — 80048 BASIC METABOLIC PNL TOTAL CA: CPT | Performed by: FAMILY MEDICINE

## 2024-06-26 RX ORDER — SULFAMETHOXAZOLE/TRIMETHOPRIM 800-160 MG
1 TABLET ORAL 2 TIMES DAILY
Qty: 28 TABLET | Refills: 0 | Status: SHIPPED | OUTPATIENT
Start: 2024-06-26 | End: 2024-07-10

## 2024-06-26 ASSESSMENT — ASTHMA QUESTIONNAIRES
QUESTION_1 LAST FOUR WEEKS HOW MUCH OF THE TIME DID YOUR ASTHMA KEEP YOU FROM GETTING AS MUCH DONE AT WORK, SCHOOL OR AT HOME: NONE OF THE TIME
QUESTION_3 LAST FOUR WEEKS HOW OFTEN DID YOUR ASTHMA SYMPTOMS (WHEEZING, COUGHING, SHORTNESS OF BREATH, CHEST TIGHTNESS OR PAIN) WAKE YOU UP AT NIGHT OR EARLIER THAN USUAL IN THE MORNING: NOT AT ALL
QUESTION_4 LAST FOUR WEEKS HOW OFTEN HAVE YOU USED YOUR RESCUE INHALER OR NEBULIZER MEDICATION (SUCH AS ALBUTEROL): NOT AT ALL
ACT_TOTALSCORE: 25
QUESTION_2 LAST FOUR WEEKS HOW OFTEN HAVE YOU HAD SHORTNESS OF BREATH: NOT AT ALL
ACT_TOTALSCORE: 25
QUESTION_5 LAST FOUR WEEKS HOW WOULD YOU RATE YOUR ASTHMA CONTROL: COMPLETELY CONTROLLED

## 2024-06-26 ASSESSMENT — PATIENT HEALTH QUESTIONNAIRE - PHQ9
SUM OF ALL RESPONSES TO PHQ QUESTIONS 1-9: 13
SUM OF ALL RESPONSES TO PHQ QUESTIONS 1-9: 13
10. IF YOU CHECKED OFF ANY PROBLEMS, HOW DIFFICULT HAVE THESE PROBLEMS MADE IT FOR YOU TO DO YOUR WORK, TAKE CARE OF THINGS AT HOME, OR GET ALONG WITH OTHER PEOPLE: NOT DIFFICULT AT ALL

## 2024-06-26 ASSESSMENT — PAIN SCALES - GENERAL: PAINLEVEL: SEVERE PAIN (6)

## 2024-06-26 NOTE — PROGRESS NOTES
Assessment & Plan       ICD-10-CM    1. Type 2 diabetes mellitus without complication, with long-term current use of insulin (H)  E11.9 Albumin Random Urine Quantitative with Creat Ratio    Z79.4 HEMOGLOBIN A1C     Lipid panel reflex to direct LDL Non-fasting     UA with Microscopic reflex to Culture - lab collect     UA with Microscopic reflex to Culture - lab collect     Albumin Random Urine Quantitative with Creat Ratio     HEMOGLOBIN A1C     Lipid panel reflex to direct LDL Non-fasting     Urine Culture      2. Essential hypertension with goal blood pressure less than 140/90  I10 BASIC METABOLIC PANEL     BASIC METABOLIC PANEL      3. Chronic pain syndrome  G89.4 [id number] - Urine Drug Confirmation Panel (Comprehensive)     [id number] - Urine Drug Confirmation Panel (Comprehensive)      4. Macular degeneration (senile) of retina  H35.30       5. History of esophagectomy  Z98.890     Z90.49          Elderly woman who is in for recheck.  I see her when she is here seasonally, then is out of state for most of the winter.  She is been doing well overall.  Has no specific complaints outside of aches and pains that come with age.  Her chronic pain is controlled this related to her prior esophagectomy.  No changes to her pain regimen.  Will update her UDS today.  She spent some time updating me on her macular degeneration as well as the improvement after cataract surgery.  Her type 2 diabetes has been historically well-controlled.  No changes that today.  Update her labs.        Return in about 6 months (around 12/26/2024).    Alex Berumen MD  [facility]     Marsha Quintero is a 84 year old, presenting for the following health issues:  Recheck Medication and Rule out Kidney Stone        6/26/2024    11:18 AM   Additional Questions   Roomed by Carmita FOSTER     Via the Health Maintenance questionnaire, the patient has reported the following services have been completed -Eye Exam: Dr Patiño  "2024-06-17, this information has been sent to the abstraction team.  History of Present Illness       Diabetes:   She presents for follow up of diabetes.    She is not checking blood glucose.         She has no concerns regarding her diabetes at this time.  She is having numbness in feet, burning in feet, excessive thirst, blurry vision and weight loss.  The patient has had a diabetic eye exam in the last 12 months. Eye exam performed on last week. Location of last eye exam Dr Patiño.        Hypertension: She presents for follow up of hypertension.  She does not check blood pressure  regularly outside of the clinic. Outpatient blood pressures have not been over 140/90. She does not follow a low salt diet. She exercises with enough effort to increase her heart rate 9 or less minutes per day.  She exercises with enough effort to increase her heart rate 3 or less days per week.   She is taking medications regularly.     Cold all the time  Urine pain  Dry mouth  Pain controlled  Occ leg pains and numbness  Talked about sons [employer]   Sits a lot, but denies depression, just doesn't like to do much            Review of Systems  Constitutional, HEENT, cardiovascular, pulmonary, gi and gu systems are negative, except as otherwise noted.      Objective    /74   Pulse 95   Temp 98.2  F (36.8  C) (Temporal)   Resp 16   Ht 1.518 m (4' 11.75\")   Wt 60.5 kg (133 lb 6.4 oz)   SpO2 96%   BMI 26.27 kg/m    Body mass index is 26.27 kg/m .  Physical Exam   GENERAL: alert and no distress  NECK: no adenopathy, no asymmetry, masses, or scars  RESP: lungs clear to auscultation - no rales, rhonchi or wheezes  CV: regular rate and rhythm, normal S1 S2, no S3 or S4, no murmur, click or rub, no peripheral edema  ABDOMEN: soft, nontender, no hepatosplenomegaly, no masses and bowel sounds normal  MS: no gross musculoskeletal defects noted, no edema        The longitudinal plan of care for the diagnosis(es)/condition(s) as " documented were addressed during this visit. Due to the added complexity in care, I will continue to support Ingrid in the subsequent management and with ongoing continuity of care.     Signed Electronically by: Alex Berumen MD

## 2024-06-27 LAB — BACTERIA UR CULT: ABNORMAL

## 2024-07-08 ENCOUNTER — MYC MEDICAL ADVICE (OUTPATIENT)
Dept: FAMILY MEDICINE | Facility: CLINIC | Age: 84
End: 2024-07-08
Payer: COMMERCIAL

## 2024-07-09 ENCOUNTER — HOSPITAL ENCOUNTER (EMERGENCY)
Facility: CLINIC | Age: 84
Discharge: HOME | End: 2024-07-09
Attending: PHYSICIAN ASSISTANT | Admitting: PHYSICIAN ASSISTANT
Payer: COMMERCIAL

## 2024-07-09 ENCOUNTER — NURSE TRIAGE (OUTPATIENT)
Dept: FAMILY MEDICINE | Facility: CLINIC | Age: 84
End: 2024-07-09
Payer: COMMERCIAL

## 2024-07-09 VITALS
WEIGHT: 133.5 LBS | DIASTOLIC BLOOD PRESSURE: 87 MMHG | HEART RATE: 90 BPM | TEMPERATURE: 98.2 F | OXYGEN SATURATION: 95 % | RESPIRATION RATE: 18 BRPM | BODY MASS INDEX: 26.29 KG/M2 | SYSTOLIC BLOOD PRESSURE: 125 MMHG

## 2024-07-09 DIAGNOSIS — N17.9 ACUTE KIDNEY INJURY (H): ICD-10-CM

## 2024-07-09 DIAGNOSIS — R44.1 VISUAL HALLUCINATIONS: ICD-10-CM

## 2024-07-09 LAB
ALBUMIN SERPL BCG-MCNC: 4 G/DL (ref 3.5–5.2)
ALBUMIN UR-MCNC: 100 MG/DL
ALP SERPL-CCNC: 61 U/L (ref 40–150)
ALT SERPL W P-5'-P-CCNC: 17 U/L (ref 0–50)
ANION GAP SERPL CALCULATED.3IONS-SCNC: 11 MMOL/L (ref 7–15)
APPEARANCE UR: ABNORMAL
AST SERPL W P-5'-P-CCNC: 20 U/L (ref 0–45)
BASOPHILS # BLD AUTO: 0 10E3/UL (ref 0–0.2)
BASOPHILS NFR BLD AUTO: 0 %
BILIRUB DIRECT SERPL-MCNC: <0.2 MG/DL (ref 0–0.3)
BILIRUB SERPL-MCNC: 0.2 MG/DL
BILIRUB UR QL STRIP: NEGATIVE
BUN SERPL-MCNC: 11.2 MG/DL (ref 8–23)
CALCIUM SERPL-MCNC: 9.7 MG/DL (ref 8.8–10.2)
CHLORIDE SERPL-SCNC: 104 MMOL/L (ref 98–107)
COLOR UR AUTO: YELLOW
CREAT SERPL-MCNC: 1.12 MG/DL (ref 0.51–0.95)
DEPRECATED HCO3 PLAS-SCNC: 22 MMOL/L (ref 22–29)
EGFRCR SERPLBLD CKD-EPI 2021: 48 ML/MIN/1.73M2
EOSINOPHIL # BLD AUTO: 0.2 10E3/UL (ref 0–0.7)
EOSINOPHIL NFR BLD AUTO: 2 %
ERYTHROCYTE [DISTWIDTH] IN BLOOD BY AUTOMATED COUNT: 13.6 % (ref 10–15)
GLUCOSE SERPL-MCNC: 87 MG/DL (ref 70–99)
GLUCOSE UR STRIP-MCNC: NEGATIVE MG/DL
HCT VFR BLD AUTO: 44.4 % (ref 35–47)
HGB BLD-MCNC: 14.7 G/DL (ref 11.7–15.7)
HGB UR QL STRIP: NEGATIVE
HYALINE CASTS: 3 /LPF
IMM GRANULOCYTES # BLD: 0 10E3/UL
IMM GRANULOCYTES NFR BLD: 0 %
KETONES UR STRIP-MCNC: NEGATIVE MG/DL
LEUKOCYTE ESTERASE UR QL STRIP: NEGATIVE
LYMPHOCYTES # BLD AUTO: 1.6 10E3/UL (ref 0.8–5.3)
LYMPHOCYTES NFR BLD AUTO: 23 %
MCH RBC QN AUTO: 28.9 PG (ref 26.5–33)
MCHC RBC AUTO-ENTMCNC: 33.1 G/DL (ref 31.5–36.5)
MCV RBC AUTO: 87 FL (ref 78–100)
MONOCYTES # BLD AUTO: 0.6 10E3/UL (ref 0–1.3)
MONOCYTES NFR BLD AUTO: 9 %
MUCOUS THREADS #/AREA URNS LPF: PRESENT /LPF
NEUTROPHILS # BLD AUTO: 4.4 10E3/UL (ref 1.6–8.3)
NEUTROPHILS NFR BLD AUTO: 65 %
NITRATE UR QL: NEGATIVE
NRBC # BLD AUTO: 0 10E3/UL
NRBC BLD AUTO-RTO: 0 /100
PH UR STRIP: 6 [PH] (ref 5–7)
PLATELET # BLD AUTO: 184 10E3/UL (ref 150–450)
POTASSIUM SERPL-SCNC: 3.9 MMOL/L (ref 3.4–5.3)
PROT SERPL-MCNC: 6.6 G/DL (ref 6.4–8.3)
RBC # BLD AUTO: 5.08 10E6/UL (ref 3.8–5.2)
RBC URINE: 2 /HPF
SODIUM SERPL-SCNC: 137 MMOL/L (ref 135–145)
SP GR UR STRIP: 1.01 (ref 1–1.03)
SQUAMOUS EPITHELIAL: <1 /HPF
UROBILINOGEN UR STRIP-MCNC: NORMAL MG/DL
WBC # BLD AUTO: 6.8 10E3/UL (ref 4–11)
WBC URINE: 7 /HPF

## 2024-07-09 PROCEDURE — 99283 EMERGENCY DEPT VISIT LOW MDM: CPT | Mod: 25 | Performed by: PHYSICIAN ASSISTANT

## 2024-07-09 PROCEDURE — 80053 COMPREHEN METABOLIC PANEL: CPT | Performed by: PHYSICIAN ASSISTANT

## 2024-07-09 PROCEDURE — 81001 URINALYSIS AUTO W/SCOPE: CPT | Performed by: PHYSICIAN ASSISTANT

## 2024-07-09 PROCEDURE — 82248 BILIRUBIN DIRECT: CPT | Performed by: PHYSICIAN ASSISTANT

## 2024-07-09 PROCEDURE — 85004 AUTOMATED DIFF WBC COUNT: CPT | Performed by: PHYSICIAN ASSISTANT

## 2024-07-09 PROCEDURE — 99284 EMERGENCY DEPT VISIT MOD MDM: CPT | Performed by: PHYSICIAN ASSISTANT

## 2024-07-09 PROCEDURE — 36415 COLL VENOUS BLD VENIPUNCTURE: CPT | Performed by: PHYSICIAN ASSISTANT

## 2024-07-09 PROCEDURE — 80048 BASIC METABOLIC PNL TOTAL CA: CPT | Performed by: PHYSICIAN ASSISTANT

## 2024-07-09 PROCEDURE — 51701 INSERT BLADDER CATHETER: CPT | Performed by: PHYSICIAN ASSISTANT

## 2024-07-09 ASSESSMENT — ACTIVITIES OF DAILY LIVING (ADL)
ADLS_ACUITY_SCORE: 35
ADLS_ACUITY_SCORE: 35

## 2024-07-09 NOTE — TELEPHONE ENCOUNTER
"Nurse Triage SBAR    Is this a 2nd Level Triage? YES, LICENSED PRACTITIONER REVIEW IS REQUIRED    Situation: Hallucinations    Background: Patient states since starting the medication sulfamethoxazole-trimethoprim (BACTRIM DS) 800-160 MG tablet  for a bladder infection, she has been having hallucinations, slurred speech, dry tongue, her balance is off. Patient fell out of bed twice- bruising the top of her left foot, but is still able to walk on the foot.     Hallucinations: seeing horses, dinosaurs, bears, Man/women/child running across the yard at night, but when she turns the light on they \"hide\" or \"disappear\".  Abx: Had a 14 day course of abx; only three days left    Assessment: Hallucinations, slurred speech, dry tongue - stop taking and seek emergency care.    Protocol Recommended Disposition:   Discuss With PCP And Callback By Nurse Within 1 Hour  Writer spoke with Dr. Berumen and he recommends patient go to the ED to be assessed; possible s/s of septic    Recommendation: RN reviewed red flag symptoms with patient per RN protocol. Advised patient to seek emergency care.    Mirella Pink RN on 7/9/2024 at 5:15 PM    Reason for Disposition   Caller has URGENT medicine question about med that PCP or specialist prescribed and triager unable to answer question    Additional Information   Negative: Intentional drug overdose and suicidal thoughts or ideas   Negative: MORE THAN A DOUBLE DOSE of a prescription or over-the-counter (OTC) drug   Negative: DOUBLE DOSE (an extra dose or lesser amount) of prescription drug and any symptoms (e.g., dizziness, nausea, pain, sleepiness)   Negative: DOUBLE DOSE (an extra dose or lesser amount) of over-the-counter (OTC) drug and any symptoms (e.g., dizziness, nausea, pain, sleepiness)   Negative: Took another person's prescription drug   Negative: DOUBLE DOSE (an extra dose or lesser amount) of prescription drug and NO symptoms  (Exception: A double dose of antibiotics.)   " "Negative: Diabetes drug error or overdose (e.g., took wrong type of insulin or took extra dose)   Negative: Caller has medication question about med NOT prescribed by PCP and triager unable to answer question (e.g., compatibility with other med, storage)   Negative: Prescription not at pharmacy and was prescribed by PCP recently  (Exception: triager has access to EMR and prescription is recorded there. Go to Home Care and confirm for pharmacy.)   Negative: Pharmacy calling with prescription question and triager unable to answer question    Answer Assessment - Initial Assessment Questions  1. NAME of MEDICINE: \"What medicine(s) are you calling about?\"      sulfamethoxazole-trimethoprim (BACTRIM DS) 800-160 MG tablet   2. QUESTION: \"What is your question?\" (e.g., double dose of medicine, side effect)      Side effects: Hallucinations with abx  3. PRESCRIBER: \"Who prescribed the medicine?\" Reason: if prescribed by specialist, call should be referred to that group.      Dr. Berumen  4. SYMPTOMS: \"Do you have any symptoms?\" If Yes, ask: \"What symptoms are you having?\"  \"How bad are the symptoms (e.g., mild, moderate, severe)      Hallucinations, slurred speech, dry tongue, balance- off,   5. PREGNANCY:  \"Is there any chance that you are pregnant?\" \"When was your last menstrual period?\"      na    Protocols used: Medication Question Call-A-[place]    "

## 2024-07-09 NOTE — TELEPHONE ENCOUNTER
RN Triage    Patient Contact    Attempt # 1    Was call answered?  No.  Unable to leave message.    Waleska Mullins RN on 7/9/2024 at 2:27 PM

## 2024-07-09 NOTE — ED TRIAGE NOTES
Pt presents with burning with urination and intermittent confusion. Granddaughter states she has 3 days left of an antibiotic. Pt has struggled with UTI's since early May  .      Triage Assessment (Adult)       Row Name 07/09/24 1829          Triage Assessment    Airway WDL WDL        Respiratory WDL    Respiratory WDL WDL        Skin Circulation/Temperature WDL    Skin Circulation/Temperature WDL WDL        Cardiac WDL    Cardiac WDL WDL        Peripheral/Neurovascular WDL    Peripheral Neurovascular WDL WDL        Cognitive/Neuro/Behavioral WDL    Cognitive/Neuro/Behavioral WDL X  Intermittent confusion.

## 2024-07-09 NOTE — ED PROVIDER NOTES
History     Chief Complaint   Patient presents with    Urinary Frequency    Confusion       HPI  Ingrid Garcia is a 84 year old female who presents to the emergency department with her granddaughter for concerns of some hallucinations.  Granddaughter assists with history.  Patient lives in [place] during the winter and when she got home to [place] at the beginning of May she had been on an antibiotic for a UTI.  She presented to the emergency department at the beginning of June with urinary tract infection symptoms and was prescribed antibiotics for treatment.  At her visit for her yearly physical on 6/26, she was found to have a persistent UTI so she was then prescribed a 2-week course of Bactrim.  She has 3 days left of this medication.  In the last few days she started to have hallucinations.  She tells family members that she sees horses and bears in the backyard but when she turns the lights on they are gone.  She has seen people sitting in chairs that are not actually there.  She has seemed a little bit more shaky than usual as well.  She has not had any fevers.  Denies any abdominal pain, nausea or vomiting.  She does endorse burning with urination when she started the antibiotics but that is since gotten better.  She had issues with urinary incontinence but since taking oxybutynin for the last year that is gotten better as well.  Reports decreased urinary output lately.        Allergies:  Allergies   Allergen Reactions    Bactrim [Sulfamethoxazole-Trimethoprim] Hallucination     TELMA, visual hallucinations after 11 days of abx use    Morphine Sulfate Other (See Comments) and Nausea     Headache       Problem List:    Patient Active Problem List    Diagnosis Date Noted    S/P appendectomy 10/13/2012     Priority: High    Macular degeneration (senile) of retina 06/09/2022     Priority: Medium    Type 2 diabetes mellitus without complication, with long-term current use of insulin (H) 10/23/2019      Priority: Medium    Chronic pain syndrome 05/22/2018     Priority: Medium     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  All refills should only be approved by this provider, or covering partner.    Medication(s): oxy 5  .   Maximum quantity per month: 112   Clinic visit frequency required: Q 6 months     Controlled substance agreement:  Encounter-Level CSA:       There are no encounter-level csa.          Pain Clinic evaluation in the past:     DIRE Total Score(s):  No flowsheet data found.    [facility] website verification:     [email or web address]            Essential hypertension with goal blood pressure less than 140/90 06/17/2016     Priority: Medium    Osteoporosis 06/16/2015     Priority: Medium    Moderate persistent asthma 06/24/2014     Priority: Medium    GERD (gastroesophageal reflux disease) 10/13/2012     Priority: Medium    History of esophagectomy 10/14/2012     Priority: Low    Hyperlipidemia LDL goal <130 10/13/2012     Priority: Low    Personal history of malignant neoplasm of esophagus 10/13/2012     Priority: Low        Past Medical History:    Past Medical History:   Diagnosis Date    Esophageal adenocarcinoma (H) 2011    HTN (hypertension)     Hyperlipidemia     Kidney stones 3/2015    Overactive bladder     Thyroid nodule        Past Surgical History:    Past Surgical History:   Procedure Laterality Date    APPENDECTOMY OPEN  10/12/2012    Procedure: APPENDECTOMY OPEN;;  Surgeon: Sarath Valentin MD;  Location: [facility] OR    ATTEMPTED LAPAROSCOPY  10/12/2012    Procedure: ATTEMPTED LAPAROSCOPY;  Attempted Laparoscopic appendectomy, open appendectomy.;  Surgeon: Sarath Valentin MD;  Location: [facility] OR    COLONOSCOPY  08/27/08    ESOPHAGOGASTRECTOMY  3/2011    [facility]    ESOPHAGOSCOPY, GASTROSCOPY, DUODENOSCOPY (EGD), COMBINED  6/30/2014    Procedure: COMBINED ESOPHAGOSCOPY,  GASTROSCOPY, DUODENOSCOPY (EGD);  Surgeon: Estevan Cota MD;  Location: [facility]    ESOPHAGOSCOPY, GASTROSCOPY, DUODENOSCOPY (EGD), COMBINED N/A 9/2/2016    Procedure: COMBINED ESOPHAGOSCOPY, GASTROSCOPY, DUODENOSCOPY (EGD);  Surgeon: Juan Antonio Esteban MD;  Location: [facility]    ESOPHAGOSCOPY, GASTROSCOPY, DUODENOSCOPY (EGD), DILATATION, COMBINED  6/1/2011    Procedure:COMBINED ESOPHAGOSCOPY, GASTROSCOPY, DUODENOSCOPY (EGD), DILATATION; Surgeon:KITTY BUCK; Location:[facility] GI    HC UGI ENDOSCOPY DIAG W BIOPSY  10/28/09    HC UGI ENDOSCOPY DIAG W OR W/O BRUSH/WASH  05/24/2004    HC UGI ENDOSCOPY DIAG W OR W/O BRUSH/WASH  08/10/2005    ZZHC COLONOSCOPY W/WO BRUSH/WASH  05/24/2004    ZZ UGI ENDOSCOPY, SIMPLE EXAM  08/27/08       Family History:    No family history on file.    Social History:  Marital Status:   [2]  Social History     Tobacco Use    Smoking status: Never    Smokeless tobacco: Never   Vaping Use    Vaping status: Never Used   Substance Use Topics    Alcohol use: Yes     Alcohol/week: 0.8 standard drinks of alcohol     Types: 1 Glasses of wine per week     Comment: daily 1-2 glasses of wine    Drug use: No        Medications:    amLODIPine-benazepril (LOTREL) 5-20 MG capsule  aspirin-acetaminophen-caffeine (EXCEDRIN MIGRAINE) 250-250-65 MG tablet  calcium carbonate-vitamin D (OS-CRISTIN) 600-400 MG-UNIT chewable tablet  COENZYME Q-10 PO  cyanocolbalamin (VITAMIN  B-12) 1000 MCG tablet  famotidine (PEPCID) 40 MG tablet  loperamide (IMODIUM A-D) 2 MG tablet  metFORMIN (GLUCOPHAGE XR) 500 MG 24 hr tablet  metoprolol tartrate (LOPRESSOR) 25 MG tablet  Multiple Vitamin (MULTI-VITAMIN) per tablet  Multiple Vitamins-Minerals (OCUVITE PRESERVISION PO)  oxyBUTYnin (DITROPAN) 5 MG tablet  oxyCODONE (ROXICODONE) 5 MG tablet  pantoprazole (PROTONIX) 40 MG EC tablet  rosuvastatin (CRESTOR) 20 MG tablet  sulfamethoxazole-trimethoprim (BACTRIM DS) 800-160 MG tablet          Review of Systems   All other  systems reviewed and are negative.          Physical Exam   BP: 125/87  Pulse: 98  Temp: 98.2  F (36.8  C)  Resp: 20  Weight: 60.6 kg (133 lb 8 oz)  SpO2: 96 %      Physical Exam  Vitals and nursing note reviewed.   Constitutional:       General: She is not in acute distress.     Appearance: Normal appearance. She is well-developed. She is not diaphoretic.   HENT:      Head: Normocephalic and atraumatic.      Nose: Nose normal.   Eyes:      Conjunctiva/sclera: Conjunctivae normal.      Pupils: Pupils are equal, round, and reactive to light.   Cardiovascular:      Rate and Rhythm: Normal rate and regular rhythm.      Heart sounds: Normal heart sounds.   Pulmonary:      Effort: Pulmonary effort is normal. No respiratory distress.      Breath sounds: Normal breath sounds.   Abdominal:      General: Bowel sounds are normal. There is no distension.      Palpations: Abdomen is soft.      Tenderness: There is no abdominal tenderness. There is no right CVA tenderness or left CVA tenderness.   Musculoskeletal:         General: No deformity.      Cervical back: Neck supple.   Skin:     General: Skin is warm and dry.   Neurological:      General: No focal deficit present.      Mental Status: She is alert and oriented to person, place, and time.      Coordination: Coordination normal.   Psychiatric:         Mood and Affect: Mood normal.      Comments: Reported visual hallucinations.  Otherwise alert and oriented the person, place, time.             ED Course        Procedures      Results for orders placed or performed during the hospital encounter of 07/09/24 (from the past 24 hour(s))   UA with Microscopic reflex to Culture    Specimen: Urine, Catheter   Result Value Ref Range    Color Urine Yellow Colorless, Straw, Light Yellow, Yellow    Appearance Urine Slightly Cloudy (A) Clear    Glucose Urine Negative Negative mg/dL    Bilirubin Urine Negative Negative    Ketones Urine Negative Negative mg/dL    Specific Gravity Urine  1.008 1.003 - 1.035    Blood Urine Negative Negative    pH Urine 6.0 5.0 - 7.0    Protein Albumin Urine 100 (A) Negative mg/dL    Urobilinogen Urine Normal Normal, 2.0 mg/dL    Nitrite Urine Negative Negative    Leukocyte Esterase Urine Negative Negative    Mucus Urine Present (A) None Seen /LPF    RBC Urine 2 <=2 /HPF    WBC Urine 7 (H) <=5 /HPF    Squamous Epithelials Urine <1 <=1 /HPF    Hyaline Casts Urine 3 (H) <=2 /LPF    Narrative    Urine Culture not indicated   CBC with platelets differential    Narrative    The following orders were created for panel order CBC with platelets differential.  Procedure                               Abnormality         Status                     ---------                               -----------         ------                     CBC with platelets and d...[[id number]]                      Final result                 Please view results for these tests on the individual orders.   Basic metabolic panel   Result Value Ref Range    Sodium 137 135 - 145 mmol/L    Potassium 3.9 3.4 - 5.3 mmol/L    Chloride 104 98 - 107 mmol/L    Carbon Dioxide (CO2) 22 22 - 29 mmol/L    Anion Gap 11 7 - 15 mmol/L    Urea Nitrogen 11.2 8.0 - 23.0 mg/dL    Creatinine 1.12 (H) 0.51 - 0.95 mg/dL    GFR Estimate 48 (L) >60 mL/min/1.73m2    Calcium 9.7 8.8 - 10.2 mg/dL    Glucose 87 70 - 99 mg/dL   CBC with platelets and differential   Result Value Ref Range    WBC Count 6.8 4.0 - 11.0 10e3/uL    RBC Count 5.08 3.80 - 5.20 10e6/uL    Hemoglobin 14.7 11.7 - 15.7 g/dL    Hematocrit 44.4 35.0 - 47.0 %    MCV 87 78 - 100 fL    MCH 28.9 26.5 - 33.0 pg    MCHC 33.1 31.5 - 36.5 g/dL    RDW 13.6 10.0 - 15.0 %    Platelet Count 184 150 - 450 10e3/uL    % Neutrophils 65 %    % Lymphocytes 23 %    % Monocytes 9 %    % Eosinophils 2 %    % Basophils 0 %    % Immature Granulocytes 0 %    NRBCs per 100 WBC 0 <1 /100    Absolute Neutrophils 4.4 1.6 - 8.3 10e3/uL    Absolute Lymphocytes 1.6 0.8 - 5.3 10e3/uL     Absolute Monocytes 0.6 0.0 - 1.3 10e3/uL    Absolute Eosinophils 0.2 0.0 - 0.7 10e3/uL    Absolute Basophils 0.0 0.0 - 0.2 10e3/uL    Absolute Immature Granulocytes 0.0 <=0.4 10e3/uL    Absolute NRBCs 0.0 10e3/uL   Hepatic function panel   Result Value Ref Range    Protein Total 6.6 6.4 - 8.3 g/dL    Albumin 4.0 3.5 - 5.2 g/dL    Bilirubin Total 0.2 <=1.2 mg/dL    Alkaline Phosphatase 61 40 - 150 U/L    AST 20 0 - 45 U/L    ALT 17 0 - 50 U/L    Bilirubin Direct <0.20 0.00 - 0.30 mg/dL       Medications - No data to display      Assessments & Plan (with Medical Decision Making)  Ingrid Garcia is a 84 year old female who presented to the ED for visual hallucinations and decreased urinary output.  Symptoms have been ongoing for the last couple days.  She is currently on day 11 of Bactrim for persistent UTI.  Reports burning with urination has improved.  On arrival to the ED, she had normal vital signs.  Was alert and oriented to person, place, and time.  Expressed seeing things out in the yard like animals but acknowledging that they were not real.  Overall had decent insight to situation.  She had no acute abnormalities found on exam today.  Labs obtained for further evaluation.  Urinalysis appeared clear of any signs of infection.  She did have protein in her urine and blood work showed her creatinine was increased to 1.12 with a GFR of 48.  Liver enzymes were within normal limits.  White blood cell count was normal.  Per literature review it does appear that Bactrim can cause the side effects of hallucinations, renal impairment, and oliguria.  I do not have a clear cause for her symptoms otherwise and since her UTI appears to have resolved I think it is appropriate to discontinue the Bactrim.  I do not see a need for hospitalization as she is otherwise stable and her granddaughter is comfortable with monitoring her at home.  Bactrim will be discontinued and I advise she increase her fluid intake and monitor  things over the next couple days.  I will place an order for repeat BMP and optimally her primary care provider sees her at the end of the week for reevaluation.  They were provided instructions to return to the ED however if she does develop any worsening symptoms.  All questions answered and patient discharged home in suitable condition.     I have reviewed the nursing notes.    I have reviewed the findings, diagnosis, plan and need for follow up with the patient.      Discharge Medication List as of 7/9/2024  8:37 PM          Final diagnoses:   Visual hallucinations   Acute kidney injury (H24)     Note: Chart documentation done in part with Dragon Voice Recognition software. Although reviewed after completion, some word and grammatical errors may remain.     7/9/2024   [facility] EMERGENCY DEPT       Stephanie Cordoba PA-C  07/09/24 [id number]

## 2024-07-10 NOTE — DISCHARGE INSTRUCTIONS
Please stop taking the Bactrim, the antibiotic, because your urine looked like it had cleared up as there are no signs of infection today and I am worried that this has caused the hallucinations you are having.  I think it may have also affected your renal function a little bit.    Please increase fluid intake over the next couple days.  Have your labs rechecked in 2 to 3 days at the lab here.  Hopefully you can get into the clinic for reevaluation but otherwise call Dr. Berumen in a couple days to check in and see how you are doing.    If you do have any worsening symptoms please return to the emergency department.    Thank you for choosing [facility]'s Emergency Department. It was a pleasure taking care of you today. If you have any questions, please call [phone].    Alphonso Mcduffie, CRISTIANO

## 2024-07-20 ENCOUNTER — MYC REFILL (OUTPATIENT)
Dept: FAMILY MEDICINE | Facility: CLINIC | Age: 84
End: 2024-07-20
Payer: COMMERCIAL

## 2024-07-20 DIAGNOSIS — R35.0 URINE FREQUENCY: ICD-10-CM

## 2024-07-22 ENCOUNTER — MYC MEDICAL ADVICE (OUTPATIENT)
Dept: FAMILY MEDICINE | Facility: CLINIC | Age: 84
End: 2024-07-22
Payer: COMMERCIAL

## 2024-07-22 DIAGNOSIS — I10 ESSENTIAL HYPERTENSION WITH GOAL BLOOD PRESSURE LESS THAN 140/90: ICD-10-CM

## 2024-07-22 DIAGNOSIS — G89.4 CHRONIC PAIN SYNDROME: ICD-10-CM

## 2024-07-23 RX ORDER — OXYCODONE HYDROCHLORIDE 5 MG/1
5 TABLET ORAL EVERY 6 HOURS PRN
Qty: 112 TABLET | Refills: 0 | Status: SHIPPED | OUTPATIENT
Start: 2024-07-23 | End: 2024-08-14

## 2024-07-23 RX ORDER — OXYBUTYNIN CHLORIDE 5 MG/1
5 TABLET ORAL 3 TIMES DAILY
Qty: 90 TABLET | Refills: 2 | Status: SHIPPED | OUTPATIENT
Start: 2024-07-23

## 2024-07-23 RX ORDER — AMLODIPINE AND BENAZEPRIL HYDROCHLORIDE 5; 20 MG/1; MG/1
1 CAPSULE ORAL DAILY
Qty: 90 CAPSULE | Refills: 3 | Status: SHIPPED | OUTPATIENT
Start: 2024-07-23

## 2024-08-06 ENCOUNTER — TELEPHONE (OUTPATIENT)
Dept: FAMILY MEDICINE | Facility: CLINIC | Age: 84
End: 2024-08-06
Payer: COMMERCIAL

## 2024-08-06 ENCOUNTER — MYC MEDICAL ADVICE (OUTPATIENT)
Dept: FAMILY MEDICINE | Facility: CLINIC | Age: 84
End: 2024-08-06
Payer: COMMERCIAL

## 2024-08-06 DIAGNOSIS — R35.0 URINE FREQUENCY: Primary | ICD-10-CM

## 2024-08-06 NOTE — TELEPHONE ENCOUNTER
Update Dakota [facility] [phone]     Patient had meeting with NP Dakota today from [employer] and he stated patient status was as such.      No signs of focal neuro deficits, weak but equal, afebrile, A&O x4, CVA tenderness, dysuria, fatigued     Stated family was concerned and called clinic regarding confusion. Orders placed for UA.    RN called patient and relayed message regarding orders and set up appointment for patient. Reviewed if symptoms worsen to be seen sooner at UC/ED.   Appointments in Next Year      Aug 07, 2024 11:45 AM  LAB with [facility]  [facility] ([facility] ) [phone]          Patient verbalized understanding and all questions answered.   Mary Munoz RN  [facility] - Registered Nurse  Clinic Triage [place]   August 6, 2024

## 2024-08-06 NOTE — TELEPHONE ENCOUNTER
Spoke to patient, she is currently with an NP, [employer] present now. He did a neuro exam and states patient is A/O x4, good strengths, no confusion. She states she has symptoms go away after treatment for urinary issues in July. She is now having more dysuria and they are requesting UA to recheck for infection.    Routing to covering provider as PCP is OOO.

## 2024-08-14 DIAGNOSIS — G89.4 CHRONIC PAIN SYNDROME: ICD-10-CM

## 2024-08-16 NOTE — TELEPHONE ENCOUNTER
Patient calling stating she would like this filled before the weekend as she only has 4 pills left.

## 2024-08-19 RX ORDER — OXYCODONE HYDROCHLORIDE 5 MG/1
5 TABLET ORAL EVERY 6 HOURS PRN
Qty: 112 TABLET | Refills: 0 | Status: SHIPPED | OUTPATIENT
Start: 2024-08-19 | End: 2024-09-17

## 2024-09-01 ENCOUNTER — HEALTH MAINTENANCE LETTER (OUTPATIENT)
Age: 84
End: 2024-09-01

## 2024-09-17 DIAGNOSIS — G89.4 CHRONIC PAIN SYNDROME: ICD-10-CM

## 2024-09-17 NOTE — TELEPHONE ENCOUNTER
Patient requesting refill of her oxycodone.   Will route to PCP    Viji Coelho RN on 9/17/2024 at 2:52 PM     chills/PAIN/NAUSEA

## 2024-09-20 RX ORDER — OXYCODONE HYDROCHLORIDE 5 MG/1
5 TABLET ORAL EVERY 6 HOURS PRN
Qty: 112 TABLET | Refills: 0 | Status: SHIPPED | OUTPATIENT
Start: 2024-09-20

## 2024-10-14 DIAGNOSIS — G89.4 CHRONIC PAIN SYNDROME: ICD-10-CM

## 2024-10-14 DIAGNOSIS — R35.0 URINE FREQUENCY: ICD-10-CM

## 2024-10-16 DIAGNOSIS — R10.13 CHRONIC EPIGASTRIC PAIN: ICD-10-CM

## 2024-10-16 DIAGNOSIS — G89.29 CHRONIC EPIGASTRIC PAIN: ICD-10-CM

## 2024-10-16 RX ORDER — PANTOPRAZOLE SODIUM 40 MG/1
40 TABLET, DELAYED RELEASE ORAL DAILY
Qty: 90 TABLET | Refills: 1 | Status: SHIPPED | OUTPATIENT
Start: 2024-10-16

## 2024-10-18 ENCOUNTER — TELEPHONE (OUTPATIENT)
Dept: FAMILY MEDICINE | Facility: CLINIC | Age: 84
End: 2024-10-18
Payer: COMMERCIAL

## 2024-10-18 RX ORDER — OXYCODONE HYDROCHLORIDE 5 MG/1
5 TABLET ORAL EVERY 6 HOURS PRN
Qty: 112 TABLET | Refills: 0 | Status: SHIPPED | OUTPATIENT
Start: 2024-10-18

## 2024-10-18 RX ORDER — OXYBUTYNIN CHLORIDE 5 MG/1
5 TABLET ORAL 3 TIMES DAILY
Qty: 90 TABLET | Refills: 2 | Status: SHIPPED | OUTPATIENT
Start: 2024-10-18

## 2024-10-22 NOTE — TELEPHONE ENCOUNTER
Prior Authorization Not Needed per Insurance    Medication: OXYCODONE HCL 5 MG PO TABS  Insurance Company: [employer] ([facility]) - Phone [phone] Fax [phone]  Expected CoPay: $    Pharmacy Filling the Rx: [facility] [address]   Pharmacy Notified: y  Patient Notified: Instructed pharmacy to notify patient once order is ready.     Pharmacy has paid claim.

## 2024-11-10 ENCOUNTER — HEALTH MAINTENANCE LETTER (OUTPATIENT)
Age: 84
End: 2024-11-10

## 2024-12-17 DIAGNOSIS — E78.5 HYPERLIPIDEMIA LDL GOAL <130: ICD-10-CM

## 2024-12-18 RX ORDER — ROSUVASTATIN CALCIUM 20 MG/1
TABLET, COATED ORAL
Qty: 90 TABLET | Refills: 3 | OUTPATIENT
Start: 2024-12-18

## 2024-12-20 DIAGNOSIS — G89.4 CHRONIC PAIN SYNDROME: ICD-10-CM

## 2024-12-23 RX ORDER — OXYCODONE HYDROCHLORIDE 5 MG/1
5 TABLET ORAL EVERY 6 HOURS PRN
Qty: 112 TABLET | Refills: 0 | Status: SHIPPED | OUTPATIENT
Start: 2024-12-23

## 2025-03-02 ENCOUNTER — HEALTH MAINTENANCE LETTER (OUTPATIENT)
Age: 85
End: 2025-03-02

## 2025-03-06 ENCOUNTER — TELEPHONE (OUTPATIENT)
Dept: FAMILY MEDICINE | Facility: CLINIC | Age: 85
End: 2025-03-06
Payer: COMMERCIAL

## 2025-03-06 NOTE — TELEPHONE ENCOUNTER
Patient Quality Outreach    Patient is due for the following:   Diabetes -  A1C and Foot Exam  Asthma  -  ACT needed  Depression  -  PHQ-9 needed  Physical Annual Wellness Visit    Action(s) Taken:   Schedule a Annual Wellness Visit    Type of outreach:    Sent [employer] message.    Questions for provider review:    None           Mi Tena MA  Chart routed to Care Team.

## 2025-03-13 DIAGNOSIS — G89.29 CHRONIC EPIGASTRIC PAIN: ICD-10-CM

## 2025-03-13 DIAGNOSIS — R10.13 CHRONIC EPIGASTRIC PAIN: ICD-10-CM

## 2025-03-13 RX ORDER — PANTOPRAZOLE SODIUM 40 MG/1
40 TABLET, DELAYED RELEASE ORAL DAILY
Qty: 90 TABLET | Refills: 0 | Status: SHIPPED | OUTPATIENT
Start: 2025-03-13

## 2025-03-18 DIAGNOSIS — G89.4 CHRONIC PAIN SYNDROME: ICD-10-CM

## 2025-03-18 RX ORDER — OXYCODONE HYDROCHLORIDE 5 MG/1
5 TABLET ORAL EVERY 6 HOURS PRN
Qty: 112 TABLET | Refills: 0 | Status: SHIPPED | OUTPATIENT
Start: 2025-03-18

## 2025-04-03 ENCOUNTER — TELEPHONE (OUTPATIENT)
Dept: FAMILY MEDICINE | Facility: CLINIC | Age: 85
End: 2025-04-03
Payer: COMMERCIAL

## 2025-04-03 NOTE — TELEPHONE ENCOUNTER
Order/Referral Request    Who is requesting: patient    Orders being requested: orders for Urine lab    Reason service is needed/diagnosis: due to possible bladder infection per pt    When are orders needed by: as soon as possible     Has this been discussed with Provider: Yes    Does patient have a preference on a Group/Provider/Facility? [facility]    Does patient have an appointment scheduled?: No    Where to send orders: Place orders within Epic    Could we send this information to you in [facility] or would you prefer to receive a phone call?:   Patient would prefer a phone call   Okay to leave a detailed message?: Yes at Cell number on file:    Telephone Information:   Mobile [phone]

## 2025-04-08 ENCOUNTER — MYC MEDICAL ADVICE (OUTPATIENT)
Dept: FAMILY MEDICINE | Facility: CLINIC | Age: 85
End: 2025-04-08
Payer: COMMERCIAL

## 2025-04-09 ENCOUNTER — TRANSFERRED RECORDS (OUTPATIENT)
Dept: HEALTH INFORMATION MANAGEMENT | Facility: CLINIC | Age: 85
End: 2025-04-09
Payer: COMMERCIAL

## 2025-04-09 LAB — RETINOPATHY: NEGATIVE

## 2025-04-15 NOTE — TELEPHONE ENCOUNTER
ED notes from July 2024 stated patient was on 2 week course of bactrim for persistent UTI, but developed hallucinations 3 days before course completion. Would you like her to submit new UA?     Khoa Brothers RN on 4/15/2025 at 7:48 AM

## 2025-04-15 NOTE — TELEPHONE ENCOUNTER
RN Triage    Patient Contact    Attempt # 1    Was call answered?  No.  Left message on voicemail with information to call me back. and sent Gonzalo Madera RN on 4/15/2025 at 9:43 AM

## 2025-04-16 NOTE — TELEPHONE ENCOUNTER
RN Triage    Patient Contact    Attempt # 2    Was call answered?  No.  Left message on voicemail with information to call me back. and Patient has not read previous MyChart.     Khoa Brothers RN on 4/16/2025 at 9:04 AM

## 2025-04-17 NOTE — TELEPHONE ENCOUNTER
RN TRIAGE CALL:    Patient Contact    Attempt # 3    Was call answered?  No.  Left message on voicemail with information to call me back.    We have made multiple attempts to call and MyChart patient, with no response. Will route back to provider as LYNSEY.     Angie Jones, JIMBON, RN

## 2025-05-14 DIAGNOSIS — R10.13 CHRONIC EPIGASTRIC PAIN: ICD-10-CM

## 2025-05-14 DIAGNOSIS — G89.29 CHRONIC EPIGASTRIC PAIN: ICD-10-CM

## 2025-05-15 RX ORDER — PANTOPRAZOLE SODIUM 40 MG/1
40 TABLET, DELAYED RELEASE ORAL DAILY
Qty: 90 TABLET | Refills: 0 | Status: SHIPPED | OUTPATIENT
Start: 2025-05-15

## 2025-05-16 DIAGNOSIS — G89.4 CHRONIC PAIN SYNDROME: ICD-10-CM

## 2025-05-22 RX ORDER — OXYCODONE HYDROCHLORIDE 5 MG/1
5 TABLET ORAL EVERY 6 HOURS PRN
Qty: 112 TABLET | Refills: 0 | Status: SHIPPED | OUTPATIENT
Start: 2025-05-22

## 2025-06-05 DIAGNOSIS — I10 ESSENTIAL HYPERTENSION WITH GOAL BLOOD PRESSURE LESS THAN 140/90: ICD-10-CM

## 2025-06-10 ENCOUNTER — MYC MEDICAL ADVICE (OUTPATIENT)
Dept: FAMILY MEDICINE | Facility: CLINIC | Age: 85
End: 2025-06-10
Payer: COMMERCIAL

## 2025-06-10 RX ORDER — AMLODIPINE AND BENAZEPRIL HYDROCHLORIDE 5; 20 MG/1; MG/1
1 CAPSULE ORAL DAILY
Qty: 90 CAPSULE | Refills: 0 | Status: SHIPPED | OUTPATIENT
Start: 2025-06-10

## 2025-06-10 NOTE — TELEPHONE ENCOUNTER
Left message for patient to return call. Sent [employer] with reminder.  Florence Balbuena MA 6/10/2025

## 2025-06-10 NOTE — LETTER
June 16, 2025      Ingrid Garcia  [address] [facility] [id number]        Dear Ingrid,         We are concerned about your health care.  We recently provided you with a medication refill.  Many medications require routine follow-up with your Doctor.       At this time we ask that: You schedule a routine office visit with your physician to follow your recent refill, has been a yes since you were seen this isn't urgent but you should schedule as soon as possible as Dr Berumen is booking out.  Call the clinic at [phone] to schedule.      Your prescription:  Has been filled. Please schedule a follow up visit for first available appointment. Courtesy refills will be given if needed until your scheduled appointment.             Care Team

## 2025-06-21 ENCOUNTER — HEALTH MAINTENANCE LETTER (OUTPATIENT)
Age: 85
End: 2025-06-21

## 2025-06-24 DIAGNOSIS — G89.4 CHRONIC PAIN SYNDROME: ICD-10-CM

## 2025-06-25 RX ORDER — OXYCODONE HYDROCHLORIDE 5 MG/1
5 TABLET ORAL EVERY 6 HOURS PRN
Qty: 112 TABLET | Refills: 0 | Status: SHIPPED | OUTPATIENT
Start: 2025-06-25

## 2025-08-18 ENCOUNTER — TELEPHONE (OUTPATIENT)
Dept: FAMILY MEDICINE | Facility: CLINIC | Age: 85
End: 2025-08-18
Payer: COMMERCIAL

## 2025-08-22 DIAGNOSIS — G89.4 CHRONIC PAIN SYNDROME: ICD-10-CM

## 2025-08-24 ENCOUNTER — TELEPHONE (OUTPATIENT)
Dept: FAMILY MEDICINE | Facility: CLINIC | Age: 85
End: 2025-08-24
Payer: COMMERCIAL

## 2025-08-24 DIAGNOSIS — G89.4 CHRONIC PAIN SYNDROME: ICD-10-CM

## 2025-08-24 RX ORDER — OXYCODONE HYDROCHLORIDE 5 MG/1
5 TABLET ORAL EVERY 6 HOURS PRN
Qty: 112 TABLET | Refills: 0 | OUTPATIENT
Start: 2025-08-24

## 2025-08-26 RX ORDER — OXYCODONE HYDROCHLORIDE 5 MG/1
5 TABLET ORAL EVERY 6 HOURS PRN
Qty: 112 TABLET | Refills: 0 | Status: SHIPPED | OUTPATIENT
Start: 2025-08-26